# Patient Record
Sex: FEMALE | Race: WHITE | NOT HISPANIC OR LATINO | URBAN - METROPOLITAN AREA
[De-identification: names, ages, dates, MRNs, and addresses within clinical notes are randomized per-mention and may not be internally consistent; named-entity substitution may affect disease eponyms.]

---

## 2017-02-05 ENCOUNTER — EMERGENCY (EMERGENCY)
Facility: HOSPITAL | Age: 57
LOS: 0 days | Discharge: HOME | End: 2017-02-05

## 2017-06-27 DIAGNOSIS — Z98.1 ARTHRODESIS STATUS: ICD-10-CM

## 2017-06-27 DIAGNOSIS — R07.89 OTHER CHEST PAIN: ICD-10-CM

## 2017-06-27 DIAGNOSIS — K44.9 DIAPHRAGMATIC HERNIA WITHOUT OBSTRUCTION OR GANGRENE: ICD-10-CM

## 2017-06-27 DIAGNOSIS — Z98.890 OTHER SPECIFIED POSTPROCEDURAL STATES: ICD-10-CM

## 2017-06-27 DIAGNOSIS — E03.9 HYPOTHYROIDISM, UNSPECIFIED: ICD-10-CM

## 2017-06-27 DIAGNOSIS — Z90.710 ACQUIRED ABSENCE OF BOTH CERVIX AND UTERUS: ICD-10-CM

## 2018-03-15 ENCOUNTER — INPATIENT (INPATIENT)
Facility: HOSPITAL | Age: 58
LOS: 4 days | Discharge: HOME | End: 2018-03-20
Attending: INTERNAL MEDICINE

## 2018-03-15 VITALS
OXYGEN SATURATION: 97 % | HEIGHT: 68 IN | SYSTOLIC BLOOD PRESSURE: 148 MMHG | HEART RATE: 91 BPM | WEIGHT: 225.09 LBS | TEMPERATURE: 97 F | DIASTOLIC BLOOD PRESSURE: 86 MMHG | RESPIRATION RATE: 19 BRPM

## 2018-03-15 DIAGNOSIS — N91.2 AMENORRHEA, UNSPECIFIED: Chronic | ICD-10-CM

## 2018-03-15 DIAGNOSIS — Z98.890 OTHER SPECIFIED POSTPROCEDURAL STATES: Chronic | ICD-10-CM

## 2018-03-15 DIAGNOSIS — R42 DIZZINESS AND GIDDINESS: ICD-10-CM

## 2018-03-15 DIAGNOSIS — R56.9 UNSPECIFIED CONVULSIONS: ICD-10-CM

## 2018-03-15 DIAGNOSIS — E03.9 HYPOTHYROIDISM, UNSPECIFIED: ICD-10-CM

## 2018-03-15 LAB
ALBUMIN SERPL ELPH-MCNC: 4.6 G/DL — SIGNIFICANT CHANGE UP (ref 3.5–5.2)
ALP SERPL-CCNC: 91 U/L — SIGNIFICANT CHANGE UP (ref 30–115)
ALT FLD-CCNC: 18 U/L — SIGNIFICANT CHANGE UP (ref 0–41)
AST SERPL-CCNC: 19 U/L — SIGNIFICANT CHANGE UP (ref 0–41)
BASOPHILS # BLD AUTO: 0.06 K/UL — SIGNIFICANT CHANGE UP (ref 0–0.2)
BASOPHILS NFR BLD AUTO: 1.1 % — HIGH (ref 0–1)
BILIRUB SERPL-MCNC: 0.9 MG/DL — SIGNIFICANT CHANGE UP (ref 0.2–1.2)
BUN SERPL-MCNC: 12 MG/DL — SIGNIFICANT CHANGE UP (ref 10–20)
CALCIUM SERPL-MCNC: 9.5 MG/DL — SIGNIFICANT CHANGE UP (ref 8.5–10.1)
CHLORIDE SERPL-SCNC: 100 MMOL/L — SIGNIFICANT CHANGE UP (ref 98–110)
CK MB BLD-MCNC: 1 % — SIGNIFICANT CHANGE UP (ref 0–4)
CK MB CFR SERPL CALC: 1.8 NG/ML — SIGNIFICANT CHANGE UP (ref 0.6–6.3)
CK SERPL-CCNC: 161 U/L — SIGNIFICANT CHANGE UP (ref 0–225)
CO2 SERPL-SCNC: 26 MMOL/L — SIGNIFICANT CHANGE UP (ref 17–32)
CREAT SERPL-MCNC: 0.8 MG/DL — SIGNIFICANT CHANGE UP (ref 0.7–1.5)
EOSINOPHIL # BLD AUTO: 0.08 K/UL — SIGNIFICANT CHANGE UP (ref 0–0.7)
EOSINOPHIL NFR BLD AUTO: 1.4 % — SIGNIFICANT CHANGE UP (ref 0–8)
GLUCOSE SERPL-MCNC: 103 MG/DL — SIGNIFICANT CHANGE UP (ref 70–110)
HCT VFR BLD CALC: 44.5 % — SIGNIFICANT CHANGE UP (ref 37–47)
HGB BLD-MCNC: 15 G/DL — SIGNIFICANT CHANGE UP (ref 12–16)
IMM GRANULOCYTES NFR BLD AUTO: 0.2 % — SIGNIFICANT CHANGE UP (ref 0.1–0.3)
LYMPHOCYTES # BLD AUTO: 1.23 K/UL — SIGNIFICANT CHANGE UP (ref 1.2–3.4)
LYMPHOCYTES # BLD AUTO: 21.8 % — SIGNIFICANT CHANGE UP (ref 20.5–51.1)
MAGNESIUM SERPL-MCNC: 2 MG/DL — SIGNIFICANT CHANGE UP (ref 1.8–2.4)
MCHC RBC-ENTMCNC: 30.7 PG — SIGNIFICANT CHANGE UP (ref 27–31)
MCHC RBC-ENTMCNC: 33.7 G/DL — SIGNIFICANT CHANGE UP (ref 32–37)
MCV RBC AUTO: 91 FL — SIGNIFICANT CHANGE UP (ref 81–99)
MONOCYTES # BLD AUTO: 0.48 K/UL — SIGNIFICANT CHANGE UP (ref 0.1–0.6)
MONOCYTES NFR BLD AUTO: 8.5 % — SIGNIFICANT CHANGE UP (ref 1.7–9.3)
NEUTROPHILS # BLD AUTO: 3.77 K/UL — SIGNIFICANT CHANGE UP (ref 1.4–6.5)
NEUTROPHILS NFR BLD AUTO: 67 % — SIGNIFICANT CHANGE UP (ref 42.2–75.2)
PLATELET # BLD AUTO: 262 K/UL — SIGNIFICANT CHANGE UP (ref 130–400)
POTASSIUM SERPL-MCNC: 3.9 MMOL/L — SIGNIFICANT CHANGE UP (ref 3.5–5)
POTASSIUM SERPL-SCNC: 3.9 MMOL/L — SIGNIFICANT CHANGE UP (ref 3.5–5)
PROT SERPL-MCNC: 7 G/DL — SIGNIFICANT CHANGE UP (ref 6–8)
RBC # BLD: 4.89 M/UL — SIGNIFICANT CHANGE UP (ref 4.2–5.4)
RBC # FLD: 12.8 % — SIGNIFICANT CHANGE UP (ref 11.5–14.5)
SODIUM SERPL-SCNC: 142 MMOL/L — SIGNIFICANT CHANGE UP (ref 135–146)
TROPONIN T SERPL-MCNC: <0.01 NG/ML — SIGNIFICANT CHANGE UP
WBC # BLD: 5.63 K/UL — SIGNIFICANT CHANGE UP (ref 4.8–10.8)
WBC # FLD AUTO: 5.63 K/UL — SIGNIFICANT CHANGE UP (ref 4.8–10.8)

## 2018-03-15 RX ORDER — MECLIZINE HCL 12.5 MG
25 TABLET ORAL THREE TIMES A DAY
Qty: 0 | Refills: 0 | Status: DISCONTINUED | OUTPATIENT
Start: 2018-03-15 | End: 2018-03-20

## 2018-03-15 RX ORDER — LEVETIRACETAM 250 MG/1
250 TABLET, FILM COATED ORAL AT BEDTIME
Qty: 0 | Refills: 0 | Status: DISCONTINUED | OUTPATIENT
Start: 2018-03-15 | End: 2018-03-19

## 2018-03-15 RX ORDER — LEVOTHYROXINE SODIUM 125 MCG
50 TABLET ORAL DAILY
Qty: 0 | Refills: 0 | Status: DISCONTINUED | OUTPATIENT
Start: 2018-03-15 | End: 2018-03-20

## 2018-03-15 RX ORDER — LEVETIRACETAM 250 MG/1
250 TABLET, FILM COATED ORAL EVERY 24 HOURS
Qty: 0 | Refills: 0 | Status: DISCONTINUED | OUTPATIENT
Start: 2018-03-16 | End: 2018-03-19

## 2018-03-15 RX ORDER — LEVOTHYROXINE SODIUM 125 MCG
1 TABLET ORAL
Qty: 0 | Refills: 0 | COMMUNITY

## 2018-03-15 RX ORDER — INFLUENZA VIRUS VACCINE 15; 15; 15; 15 UG/.5ML; UG/.5ML; UG/.5ML; UG/.5ML
0.5 SUSPENSION INTRAMUSCULAR ONCE
Qty: 0 | Refills: 0 | Status: COMPLETED | OUTPATIENT
Start: 2018-03-15 | End: 2018-03-15

## 2018-03-15 RX ORDER — LEVOTHYROXINE SODIUM 125 MCG
300 TABLET ORAL DAILY
Qty: 0 | Refills: 0 | Status: DISCONTINUED | OUTPATIENT
Start: 2018-03-15 | End: 2018-03-20

## 2018-03-15 RX ORDER — SODIUM CHLORIDE 9 MG/ML
1000 INJECTION INTRAMUSCULAR; INTRAVENOUS; SUBCUTANEOUS ONCE
Qty: 0 | Refills: 0 | Status: COMPLETED | OUTPATIENT
Start: 2018-03-15 | End: 2018-03-15

## 2018-03-15 RX ORDER — LEVETIRACETAM 250 MG/1
250 TABLET, FILM COATED ORAL ONCE
Qty: 0 | Refills: 0 | Status: COMPLETED | OUTPATIENT
Start: 2018-03-15 | End: 2018-03-15

## 2018-03-15 RX ORDER — ENOXAPARIN SODIUM 100 MG/ML
40 INJECTION SUBCUTANEOUS EVERY 24 HOURS
Qty: 0 | Refills: 0 | Status: DISCONTINUED | OUTPATIENT
Start: 2018-03-15 | End: 2018-03-20

## 2018-03-15 RX ADMIN — SODIUM CHLORIDE 1000 MILLILITER(S): 9 INJECTION INTRAMUSCULAR; INTRAVENOUS; SUBCUTANEOUS at 09:46

## 2018-03-15 RX ADMIN — LEVETIRACETAM 250 MILLIGRAM(S): 250 TABLET, FILM COATED ORAL at 17:01

## 2018-03-15 RX ADMIN — LEVETIRACETAM 250 MILLIGRAM(S): 250 TABLET, FILM COATED ORAL at 21:21

## 2018-03-15 RX ADMIN — ENOXAPARIN SODIUM 40 MILLIGRAM(S): 100 INJECTION SUBCUTANEOUS at 13:52

## 2018-03-15 NOTE — H&P ADULT - NSHPPHYSICALEXAM_GEN_ALL_CORE
PHYSICAL EXAM:  GENERAL: NAD, well-developed  SKIN: No rashes or lesions  HEAD:  Atraumatic, Normocephalic  EYES: EOMI, PERRLA, conjunctiva and sclera clear; No nystagmus noted  NECK: Supple, No JVD  CHEST/LUNG: Clear to auscultation bilaterally; No wheeze  HEART: Regular rate and rhythm; No murmurs, rubs, or gallops  ABDOMEN: Soft, Nontender, Nondistended; Bowel sounds present  EXTREMITIES:  No clubbing, cyanosis, or edema  CNS: AAOx3;

## 2018-03-15 NOTE — H&P ADULT - NSHPLABSRESULTS_GEN_ALL_CORE
15.0   5.63  )-----------( 262      ( 15 Mar 2018 09:31 )             44.5       03-15    142  |  100  |  12  ----------------------------<  103  3.9   |  26  |  0.8    Ca    9.5      15 Mar 2018 09:31  Mg     2.0     03-15    TPro  7.0  /  Alb  4.6  /  TBili  0.9  /  DBili  x   /  AST  19  /  ALT  18  /  AlkPhos  91  03-15          Magnesium, Serum: 2.0 mg/dL (03-15-18 @ 09:31)

## 2018-03-15 NOTE — H&P ADULT - HISTORY OF PRESENT ILLNESS
From ED: 57 y.o F with hx of brain and neck tumors s/p resection, hypothyroidism, presenting for an episode of dizziness and nausea.  hx goes back to this morning as she was getting out of her house as she felt dizzy, her surroundings spinning, felt weak as if her legs are going to give out, called for help and did not have any fall. her neighbor helped her get in her house where she was lethargic, nauseous and dizzy. pt states having palpitations with the onset of symptoms. no fevers or chills, no hearing changes, no visual changes, no sensory or motor deficits.    Patient reports vertigo began approximately 2-3 weeks ago with episodes of vertigo lasting approximately several minutes happening 1-2x per week.  Vertigo became severe yesterday and this am Patient was unable to move.  She reports feeling the world move around her.  This am's episode accompanied with N/V x 1.  Denies any changes with head movement.  Also states that she had similar symptoms several years past and underwent tilt table testing which was positive.  She stated at that time was started on Beta blocker but stopped taking the medication in 2008.  Patient improved at bedside visit, vertigo resolved.  Her and her  also reporting seizure activity.  Patient with history of brain tumors s/p resection x 3.  Was on Phenobarbital and Keppra but stopped all medications in 2008.  Seen by Neurology in ED and requesting admission to V-EEG unit.

## 2018-03-15 NOTE — ED ADULT NURSE NOTE - PSH
Cessation of menses  c section  H/O abdominal hysterectomy    H/O cerebral aneurysm repair    H/O Spinal surgery  tumor removed plates in neck

## 2018-03-15 NOTE — ED ADULT NURSE NOTE - PMH
Brain aneurysm    Brain tumor, recurrent    Cervical cancer    Hypothyroid    Spine anomaly    Uterine cancer, sarcoma    Vertigo

## 2018-03-15 NOTE — CONSULT NOTE ADULT - ATTENDING COMMENTS
Agree with the Hx and plan.  she does have high risk for seizure.  Start keppra  500 today followed by 250 bid    V-EEG

## 2018-03-15 NOTE — ED PROVIDER NOTE - OBJECTIVE STATEMENT
57 y.o F with hx of brain and neck tumors s/p resection, hypothyroidism, presenting for an episode of dizziness and nausea.  hx goes back to this morning as she was getting out of her house as she felt dizzy, her surroundings spinning, felt weak as if her legs are going to give out, called for help and did not have any fall. her neighbor helped her get in her house where she was lethargic, nauseous and dizzy. pt states having palpitations with the onset of symptoms. no fevers or chills, no hearing changes, no visual changes, no sensory or motor deficits.  ROS otherwise negative.

## 2018-03-15 NOTE — H&P ADULT - NSHPSOCIALHISTORY_GEN_ALL_CORE
Tobacco use: Denies   EtOH use: Denies   Illicit drug use: Denies   Marital Status: Patient is  and lives at home with her .

## 2018-03-15 NOTE — CONSULT NOTE ADULT - SUBJECTIVE AND OBJECTIVE BOX
Neurology Consult    JOSIAS QUAN 57y Female  [id number]    Patient is a 57y old right-handed Female who presents with a chief complaint of Vertigo      HPI: History is obtained from patient and spouse  57 y.o female presented for an episode of severe vertigo and nausea/vomiting.  Patient woke up this morning as usual, then she was getting out of her house and felt very dizzy, her surroundings spinning, felt weak as if her legs are going to give out. Patient called for help and did not fall. The neighbor helped her get back to the house where she was very nauseous, vomited once. Patient reports this episode lasted for at least 1 hour, no LOC or confusion. Patient had a very similar episode 2 years ago, diagnosed as vertigo, was Rx Meclizine, but did  not use it for 2 years.   Patient is being followed by neurologist Dr. Henry, has appt scheduled for next week because of headaches that started 2 months ago.   In addition, about 1 month ago patient started having nocturnal episodes described by  as vocalizations associated with increased tone and 4 extremities clonic movements, ? L>R lasting, several minutes, at times in clusters. These events increased in frequency, and lately happen almost every night. These episodes are similar to the once patient had after craniotomy in  for aneurism clipping complicated by infection that required a revision. Patient was treated initially with phenobarbital, then Keppra was added. Pt remained seizure-free after that for several years, and in  self-discontinued AED. Patient stated she had REEG few years ago and was told by her neurologist it was "fine"      PAST MEDICAL & SURGICAL HISTORY:  Hypothyroidism  Spine anomaly  Uterine cancer  Cervical cancer  Brain aneurysm   seizures  H/O Spinal surgery: tumor removed plates in neck  H/O craniotomy for cerebral aneurysm repair   H/O abdominal hysterectomy      Social History: (-) x 3    Allergy:  No Known Allergies      Home Medications:  Synthroid: 350 microgram(s) orally once a day (15 Mar 2018 13:00)      MEDICATIONS  (STANDING):  enoxaparin Injectable 40 milliGRAM(s) SubCutaneous every 24 hours  levETIRAcetam 250 milliGRAM(s) in the morning  levETIRAcetam 250 milliGRAM(s) Oral at bedtime  levothyroxine 350 MICROGram(s) Oral daily      MEDICATIONS  (PRN):  LORazepam   Injectable 2 milliGRAM(s) IV Push four times a day PRN for generalized tonic-clonic seizure lasting longer than 2 minutes, or two consecutive seizures without return to baseline in-between  meclizine 25 milliGRAM(s) Oral three times a day PRN Dizziness      T(F): 96.9 (03-15-18 @ 15:25), Max: 97 (03-15-18 @ 14:37)  HR: 92 (03-15-18 @ 15:25) (61 - 92)  BP: 138/77 (03-15-18 @ 15:25) (132/68 - 148/86)  RR: 18 (03-15-18 @ 15:25) (16 - 20)  SpO2: 99% (03-15-18 @ 13:02) (97% - 99%)      Neurologic Examination:  General:  Appearance is consistent with chronologic age.  No abnormal facies.   General: The patient is oriented to person, place, time and date.  Recent and remote memory intact.  Follows 4-step directions. Fund of knowledge is intact and normal.  Language with normal repetition, comprehension and naming.  Nondysarthric.    Cranial nerves: EOMI w/o nystagmus, skew or reported double vision.  PERRL.  No ptosis/weakness of eyelid closure.  Facial sensation is normal with normal bite.  No facial asymmetry.  Hearing grossly intact b/l.  Palate elevates midline.  Tongue midline.  Motor examination:   Normal tone, bulk and range of motion.  No tenderness, twitching, tremors or involuntary movements.  Formal Muscle Strength Testin/5 UE; 5/5 LE.  No observable drift.  Sensory examination:   Intact to light touch and pinprick, pain  Cerebellum:   FTN/HKS intact with normal VIRGINIE in all limbs.  No dysmetria or dysdiadokinesia.  Gait narrow based and normal.    Labs:                         15.0   5.63  )-----------( 262      ( 15 Mar 2018 09:31 )             44.5     -15    142  |  100  |  12  ----------------------------<  103  3.9   |  26  |  0.8    Ca    9.5      15 Mar 2018 09:31  Mg     2.0     -15    TPro  7.0  /  Alb  4.6  /  TBili  0.9  /  DBili  x   /  AST  19  /  ALT  18  /  AlkPhos  91  03-15    LIVER FUNCTIONS - ( 15 Mar 2018 09:31 )  Alb: 4.6 g/dL / Pro: 7.0 g/dL / ALK PHOS: 91 U/L / ALT: 18 U/L / AST: 19 U/L / GGT: x             Neuroimaging:  NCHCT: CT Head No Cont:   EXAM:  CT BRAIN          PROCEDURE DATE:  03/15/2018      INTERPRETATION:  Clinical History / Reason for exam: Dizziness for    Technique: Noncontrast head CT.  Contiguous unenhanced CT axial images of   the head from the base to the vertex.    Comparison:  None available.    Findings:    The patient is status post bifrontal craniotomy. There is a small defect   within the body of the corpus callosum which is likely postoperative in   nature. Correlate with prior surgical history.    The ventricles and cortical sulci are normal in size and configuration.       Gray-white matter differentiation is maintained.     There is no acute intracranial hemorrhage, extra-axial fluid collection   or midline shift.      The osseous structures are unremarkable. The visualized paranasal sinuses   and mastoids are well-aerated.    IMPRESSION:     No evidence of acute intracranial pathology.    Status post prior bifrontal craniotomy with a postoperative defect in the   corpus callosum. Correlate with surgical history.    SHARAD GRAHAM M.D., ATTENDING RADIOLOGIST  This document has been electronically signed. Mar 15 2018 10:14AM       (03-15-18 @ 09:54)      Assessment:  This is a 57y Female with h/o bilateral craniotomies for aneurism clipping followed by revision due to infection, seizures, lumbar and cervical surgeries, uterine cancer, hysterectomy, hypothyroidism, presented to ED with severe vertigo. Also, has nocturnal episodes suggestive of seizures for several weeks with increasing frequency, off AED for 10 years.    Discussed with Dr. Hawk      Plan:   VEEG for characterization and treatment plan (will be started tomorrow morning)  Seizure precautions  Keppra 250mg x1 stat, then 250mg q12hrs (ordered)  Ativan 2mg IV PRN for generalized tonic-clonic seizure lasting longer than 2 minutes, or two consecutive seizures without return to baseline in-between (ordered)  Keep Mg above 2    Plan discussed with patient and spouse in details, all questions answered.    03-15-18 @ 16:26

## 2018-03-15 NOTE — ED PROVIDER NOTE - ATTENDING CONTRIBUTION TO CARE
pt with vertigo typical of prior episode for which she had MRI, however has been having nocturnal seizures per  for which she used to be medicated but stopped meds herself, PE as above/neuro intact, labs and studies reviewed, d/w neuro, will admit EMU

## 2018-03-16 LAB
ANION GAP SERPL CALC-SCNC: 19 MMOL/L — HIGH (ref 7–14)
BASOPHILS # BLD AUTO: 0.05 K/UL — SIGNIFICANT CHANGE UP (ref 0–0.2)
BASOPHILS NFR BLD AUTO: 0.8 % — SIGNIFICANT CHANGE UP (ref 0–1)
BUN SERPL-MCNC: 16 MG/DL — SIGNIFICANT CHANGE UP (ref 10–20)
CALCIUM SERPL-MCNC: 10 MG/DL — SIGNIFICANT CHANGE UP (ref 8.5–10.1)
CHLORIDE SERPL-SCNC: 98 MMOL/L — SIGNIFICANT CHANGE UP (ref 98–110)
CO2 SERPL-SCNC: 25 MMOL/L — SIGNIFICANT CHANGE UP (ref 17–32)
CREAT SERPL-MCNC: 0.9 MG/DL — SIGNIFICANT CHANGE UP (ref 0.7–1.5)
EOSINOPHIL # BLD AUTO: 0.14 K/UL — SIGNIFICANT CHANGE UP (ref 0–0.7)
EOSINOPHIL NFR BLD AUTO: 2.3 % — SIGNIFICANT CHANGE UP (ref 0–8)
GLUCOSE SERPL-MCNC: 86 MG/DL — SIGNIFICANT CHANGE UP (ref 70–110)
HCT VFR BLD CALC: 46.4 % — SIGNIFICANT CHANGE UP (ref 37–47)
HGB BLD-MCNC: 15.5 G/DL — SIGNIFICANT CHANGE UP (ref 12–16)
IMM GRANULOCYTES NFR BLD AUTO: 0.2 % — SIGNIFICANT CHANGE UP (ref 0.1–0.3)
LYMPHOCYTES # BLD AUTO: 1.9 K/UL — SIGNIFICANT CHANGE UP (ref 1.2–3.4)
LYMPHOCYTES # BLD AUTO: 31.7 % — SIGNIFICANT CHANGE UP (ref 20.5–51.1)
MCHC RBC-ENTMCNC: 30.8 PG — SIGNIFICANT CHANGE UP (ref 27–31)
MCHC RBC-ENTMCNC: 33.4 G/DL — SIGNIFICANT CHANGE UP (ref 32–37)
MCV RBC AUTO: 92.1 FL — SIGNIFICANT CHANGE UP (ref 81–99)
MONOCYTES # BLD AUTO: 0.51 K/UL — SIGNIFICANT CHANGE UP (ref 0.1–0.6)
MONOCYTES NFR BLD AUTO: 8.5 % — SIGNIFICANT CHANGE UP (ref 1.7–9.3)
NEUTROPHILS # BLD AUTO: 3.38 K/UL — SIGNIFICANT CHANGE UP (ref 1.4–6.5)
NEUTROPHILS NFR BLD AUTO: 56.5 % — SIGNIFICANT CHANGE UP (ref 42.2–75.2)
NRBC # BLD: 0 /100 WBCS — SIGNIFICANT CHANGE UP (ref 0–0)
PLATELET # BLD AUTO: 189 K/UL — SIGNIFICANT CHANGE UP (ref 130–400)
POTASSIUM SERPL-MCNC: 4.1 MMOL/L — SIGNIFICANT CHANGE UP (ref 3.5–5)
POTASSIUM SERPL-SCNC: 4.1 MMOL/L — SIGNIFICANT CHANGE UP (ref 3.5–5)
RBC # BLD: 5.04 M/UL — SIGNIFICANT CHANGE UP (ref 4.2–5.4)
RBC # FLD: 13.2 % — SIGNIFICANT CHANGE UP (ref 11.5–14.5)
SODIUM SERPL-SCNC: 142 MMOL/L — SIGNIFICANT CHANGE UP (ref 135–146)
WBC # BLD: 5.99 K/UL — SIGNIFICANT CHANGE UP (ref 4.8–10.8)
WBC # FLD AUTO: 5.99 K/UL — SIGNIFICANT CHANGE UP (ref 4.8–10.8)

## 2018-03-16 RX ORDER — ACETAMINOPHEN 500 MG
650 TABLET ORAL EVERY 6 HOURS
Qty: 0 | Refills: 0 | Status: DISCONTINUED | OUTPATIENT
Start: 2018-03-16 | End: 2018-03-20

## 2018-03-16 RX ADMIN — Medication 30 MILLILITER(S): at 16:59

## 2018-03-16 RX ADMIN — LEVETIRACETAM 250 MILLIGRAM(S): 250 TABLET, FILM COATED ORAL at 21:33

## 2018-03-16 RX ADMIN — Medication 650 MILLIGRAM(S): at 19:33

## 2018-03-16 RX ADMIN — Medication 650 MILLIGRAM(S): at 20:03

## 2018-03-16 RX ADMIN — Medication 650 MILLIGRAM(S): at 00:34

## 2018-03-16 RX ADMIN — Medication 300 MICROGRAM(S): at 06:49

## 2018-03-16 RX ADMIN — ENOXAPARIN SODIUM 40 MILLIGRAM(S): 100 INJECTION SUBCUTANEOUS at 14:27

## 2018-03-16 RX ADMIN — LEVETIRACETAM 250 MILLIGRAM(S): 250 TABLET, FILM COATED ORAL at 10:11

## 2018-03-16 RX ADMIN — Medication 650 MILLIGRAM(S): at 01:04

## 2018-03-16 RX ADMIN — Medication 50 MICROGRAM(S): at 06:49

## 2018-03-17 RX ADMIN — Medication 30 MILLILITER(S): at 10:17

## 2018-03-17 RX ADMIN — Medication 30 MILLILITER(S): at 18:36

## 2018-03-17 RX ADMIN — Medication 300 MICROGRAM(S): at 06:36

## 2018-03-17 RX ADMIN — Medication 50 MICROGRAM(S): at 06:36

## 2018-03-17 NOTE — PROGRESS NOTE ADULT - SUBJECTIVE AND OBJECTIVE BOX
[id number]  [facility]  57[facility]    [facility]    Allergies: No Known Allergies      Medications:   enoxaparin Injectable 40 milliGRAM(s) SubCutaneous every 24 hours  levETIRAcetam 250 milliGRAM(s) Oral at bedtime  levETIRAcetam 250 milliGRAM(s) Oral every 24 hours  levothyroxine 300 MICROGram(s) Oral daily  levothyroxine 50 MICROGram(s) Oral daily  LORazepam   Injectable 2 milliGRAM(s) IV Push four times a day PRN  meclizine 25 milliGRAM(s) Oral three times a day PRN      T(C): 36.1 (03-17-18 @ 04:56), Max: 36.1 (03-16-18 @ 22:21)  HR: 61 (03-17-18 @ 04:56) (61 - 75)  BP: 135/75 (03-17-18 @ 04:56) (118/54 - 137/81)  RR: 16 (03-17-18 @ 04:56) (14 - 16)  SpO2: --      No events overnight    VEEG shows well organized and symmetric background. There are no epileptiform discharges or electrographic seizures.      PHYSICAL EXAM:    Constitutional: Awake and conversive. In NAD    Neurological: CN II-XII in tact. No nystagmus. Motor exam with full strength throughout, normal tone

## 2018-03-17 NOTE — PROGRESS NOTE ADULT - SUBJECTIVE AND OBJECTIVE BOX
JOSIAS QUAN  57y  Female    Patient is a 57y old  Female who presents with a chief complaint of Vertigo x 2 weeks (15 Mar 2018 12:55)    ALLERGIES:  No Known Allergies      INTERVAL HPI/OVERNIGHT EVENTS:    VITALS:  T(F): 96.9 (03-17-18 @ 04:56), Max: 97 (03-16-18 @ 22:21)  HR: 61 (03-17-18 @ 04:56) (61 - 75)  BP: 135/75 (03-17-18 @ 04:56) (118/54 - 137/81)  RR: 16 (03-17-18 @ 04:56) (14 - 16)  SpO2: --    LABS:  03-16    142  |  98  |  16  ----------------------------<  86  4.1   |  25  |  0.9    Ca    10.0      16 Mar 2018 07:12      MICROBIOLOGY:    MEDICATION:  acetaminophen   Tablet. 650 milliGRAM(s) Oral every 6 hours PRN  bismuth subsalicylate Liquid 30 milliLiter(s) Oral every 12 hours PRN  enoxaparin Injectable 40 milliGRAM(s) SubCutaneous every 24 hours  levETIRAcetam 250 milliGRAM(s) Oral at bedtime  levETIRAcetam 250 milliGRAM(s) Oral every 24 hours  levothyroxine 300 MICROGram(s) Oral daily  levothyroxine 50 MICROGram(s) Oral daily  LORazepam   Injectable 2 milliGRAM(s) IV Push four times a day PRN  meclizine 25 milliGRAM(s) Oral three times a day PRN    RADIOLOGY & ADDITIONAL TESTS:

## 2018-03-18 RX ADMIN — Medication 50 MICROGRAM(S): at 08:23

## 2018-03-18 RX ADMIN — Medication 30 MILLILITER(S): at 17:22

## 2018-03-18 RX ADMIN — Medication 300 MICROGRAM(S): at 08:23

## 2018-03-18 NOTE — PROGRESS NOTE ADULT - ASSESSMENT
57 year old female admitted for VEEG to rule out nocturnal seizures. No events over two days and VEEG normal.    Recommend remaining another night on VEEG as based on clinical history likely to have a stereotypic event in the next day or so. Patient prefers not to stay but will discuss with her family members. If chooses togo home, is already scheduled to see her Neurologist on Friday and outpatient Ambulatory EEG may be an option for further work-up
57 year old female with history of vertigo as well as nocturnal altered mental status episodes suspicious for seizures.    1. Continue VEEG to capture an episode  2. Please discontinue Keppra
57 year old female admitted for VEEG to rule out nocturnal seizures. No events over two days and VEEG normal.    Recommend remaining another night on VEEG as per neurolgy
continue 24 Veg  neurology NP  noted --agree w plan

## 2018-03-18 NOTE — PROGRESS NOTE ADULT - SUBJECTIVE AND OBJECTIVE BOX
JOSIAS DAVIESCARMELITA  57y  Female    Patient is a 57y old  Female who presents with a chief complaint of Vertigo x 2 weeks (15 Mar 2018 12:55)    ALLERGIES:  No Known Allergies      INTERVAL HPI/OVERNIGHT EVENTS:    VITALS:  T(F): 96.3 (03-18-18 @ 05:51), Max: 97.5 (03-17-18 @ 22:42)  HR: 59 (03-18-18 @ 05:51) (59 - 84)  BP: 133/61 (03-18-18 @ 05:51) (133/61 - 143/76)  RR: 16 (03-18-18 @ 05:51) (16 - 16)  SpO2: --    LABS:        MICROBIOLOGY:    MEDICATION:  acetaminophen   Tablet. 650 milliGRAM(s) Oral every 6 hours PRN  bismuth subsalicylate Liquid 30 milliLiter(s) Oral every 12 hours PRN  enoxaparin Injectable 40 milliGRAM(s) SubCutaneous every 24 hours  levETIRAcetam 250 milliGRAM(s) Oral at bedtime  levETIRAcetam 250 milliGRAM(s) Oral every 24 hours  levothyroxine 300 MICROGram(s) Oral daily  levothyroxine 50 MICROGram(s) Oral daily  LORazepam   Injectable 2 milliGRAM(s) IV Push four times a day PRN  meclizine 25 milliGRAM(s) Oral three times a day PRN    RADIOLOGY & ADDITIONAL TESTS:

## 2018-03-18 NOTE — PROVIDER CONTACT NOTE (OTHER) - ASSESSMENT
pt c/o heartburn, followed by chest pain and palpitations, vs in flowsheet, hr 80, no other s/s of distress, md dilia GOETZ made aware ordered ekg.

## 2018-03-18 NOTE — PROGRESS NOTE ADULT - SUBJECTIVE AND OBJECTIVE BOX
[id number]  [facility]  57[facility]    [facility]    Allergies: No Known Allergies      Medications:   enoxaparin Injectable 40 milliGRAM(s) SubCutaneous every 24 hours  levETIRAcetam 250 milliGRAM(s) Oral at bedtime  levETIRAcetam 250 milliGRAM(s) Oral every 24 hours  levothyroxine 300 MICROGram(s) Oral daily  levothyroxine 50 MICROGram(s) Oral daily  meclizine 25 milliGRAM(s) Oral three times a day PRN      T(C): 35.7 (03-18-18 @ 05:51), Max: 36.4 (03-17-18 @ 22:42)  HR: 59 (03-18-18 @ 05:51) (59 - 84)  BP: 133/61 (03-18-18 @ 05:51) (133/61 - 143/76)  RR: 16 (03-18-18 @ 05:51) (16 - 16)  SpO2: --    Report of brief dizziness yesterday morning.  VEEG continues to show normal background. There is no slowing. There are no epileptiform discharges and no electrographic seizures.    PHYSICAL EXAM:    Awake, conversive, in NAD  CN II-XII in tact, no nystagmus  Motor grossly in tact, ambulatory without assistance

## 2018-03-19 RX ADMIN — Medication 30 MILLILITER(S): at 17:53

## 2018-03-19 RX ADMIN — Medication 50 MICROGRAM(S): at 05:06

## 2018-03-19 RX ADMIN — Medication 650 MILLIGRAM(S): at 18:27

## 2018-03-19 RX ADMIN — Medication 300 MICROGRAM(S): at 05:07

## 2018-03-19 RX ADMIN — Medication 650 MILLIGRAM(S): at 05:05

## 2018-03-19 NOTE — PROGRESS NOTE ADULT - SUBJECTIVE AND OBJECTIVE BOX
JOSIAS DAVIESCARMELITA  57y  Female    Patient is a 57y old  Female who presents with a chief complaint of Vertigo x 2 weeks (15 Mar 2018 12:55)    ALLERGIES:  No Known Allergies      INTERVAL HPI/OVERNIGHT EVENTS:    VITALS:  T(F): 96.9 (03-19-18 @ 05:40), Max: 97.3 (03-18-18 @ 22:00)  HR: 61 (03-19-18 @ 05:40) (61 - 88)  BP: 143/63 (03-19-18 @ 05:40) (140/66 - 160/73)  RR: 16 (03-19-18 @ 05:40) (16 - 17)  SpO2: --    LABS:        MICROBIOLOGY:    MEDICATION:  acetaminophen   Tablet. 650 milliGRAM(s) Oral every 6 hours PRN  bismuth subsalicylate Liquid 30 milliLiter(s) Oral every 12 hours PRN  enoxaparin Injectable 40 milliGRAM(s) SubCutaneous every 24 hours  levETIRAcetam 250 milliGRAM(s) Oral at bedtime  levETIRAcetam 250 milliGRAM(s) Oral every 24 hours  levothyroxine 300 MICROGram(s) Oral daily  levothyroxine 50 MICROGram(s) Oral daily  LORazepam   Injectable 2 milliGRAM(s) IV Push four times a day PRN  meclizine 25 milliGRAM(s) Oral three times a day PRN    RADIOLOGY & ADDITIONAL TESTS:

## 2018-03-19 NOTE — PROGRESS NOTE ADULT - SUBJECTIVE AND OBJECTIVE BOX
Epilepsy Attending Note:     JOSIAS QUAN    57y Female [id number]    Vital Signs Last 24 Hrs  T(C): 36.1 (19 Mar 2018 05:40), Max: 36.3 (18 Mar 2018 22:00)  T(F): 96.9 (19 Mar 2018 05:40), Max: 97.3 (18 Mar 2018 22:00)  HR: 61 (19 Mar 2018 05:40) (61 - 88)  BP: 143/63 (19 Mar 2018 05:40) (140/66 - 160/73)  BP(mean): --  RR: 16 (19 Mar 2018 05:40) (16 - 17)  SpO2: --                MEDICATIONS  (STANDING):  enoxaparin Injectable 40 milliGRAM(s) SubCutaneous every 24 hours  levETIRAcetam 250 milliGRAM(s) Oral at bedtime  levETIRAcetam 250 milliGRAM(s) Oral every 24 hours  levothyroxine 300 MICROGram(s) Oral daily  levothyroxine 50 MICROGram(s) Oral daily    MEDICATIONS  (PRN):  acetaminophen   Tablet. 650 milliGRAM(s) Oral every 6 hours PRN Mild Pain (1 - 3) or HA  bismuth subsalicylate Liquid 30 milliLiter(s) Oral every 12 hours PRN gi upset  LORazepam   Injectable 2 milliGRAM(s) IV Push four times a day PRN for generalized tonic-clonic seizure lasting longer than 2 minutes, or two consecutive seizures without return to baseline in-between  meclizine 25 milliGRAM(s) Oral three times a day PRN Dizziness        VEEG in the last 24 hours:    Background------8-9 Hz.    Focal and generalized slowing-------borderline right fronto-temporo-central slowing    Intericatal activity------rare right anterior quadrants sharp transients    Events---------none    Seizures--------none    Impression:   abnormal due to the above     Plan - continue the monitoring , no AED, seizure precaution

## 2018-03-20 ENCOUNTER — TRANSCRIPTION ENCOUNTER (OUTPATIENT)
Age: 58
End: 2018-03-20

## 2018-03-20 VITALS
HEART RATE: 59 BPM | DIASTOLIC BLOOD PRESSURE: 65 MMHG | TEMPERATURE: 96 F | SYSTOLIC BLOOD PRESSURE: 153 MMHG | RESPIRATION RATE: 16 BRPM

## 2018-03-20 RX ORDER — LEVOTHYROXINE SODIUM 125 MCG
1 TABLET ORAL
Qty: 0 | Refills: 0 | DISCHARGE
Start: 2018-03-20

## 2018-03-20 RX ORDER — LEVETIRACETAM 250 MG/1
1 TABLET, FILM COATED ORAL
Qty: 60 | Refills: 1
Start: 2018-03-20 | End: 2018-05-18

## 2018-03-20 RX ORDER — LEVETIRACETAM 250 MG/1
1500 TABLET, FILM COATED ORAL
Qty: 0 | Refills: 1 | DISCHARGE
Start: 2018-03-20 | End: 2018-05-18

## 2018-03-20 RX ORDER — LEVOTHYROXINE SODIUM 125 MCG
350 TABLET ORAL
Qty: 0 | Refills: 0 | COMMUNITY

## 2018-03-20 RX ORDER — LEVETIRACETAM 250 MG/1
12 TABLET, FILM COATED ORAL
Qty: 0 | Refills: 1 | DISCHARGE
Start: 2018-03-20 | End: 2018-05-18

## 2018-03-20 RX ORDER — LEVETIRACETAM 250 MG/1
250 TABLET, FILM COATED ORAL ONCE
Qty: 0 | Refills: 0 | Status: COMPLETED | OUTPATIENT
Start: 2018-03-20 | End: 2018-03-20

## 2018-03-20 RX ORDER — LEVETIRACETAM 250 MG/1
250 TABLET, FILM COATED ORAL
Qty: 0 | Refills: 0 | Status: DISCONTINUED | OUTPATIENT
Start: 2018-03-20 | End: 2018-03-20

## 2018-03-20 RX ADMIN — LEVETIRACETAM 250 MILLIGRAM(S): 250 TABLET, FILM COATED ORAL at 10:51

## 2018-03-20 RX ADMIN — Medication 50 MICROGRAM(S): at 06:28

## 2018-03-20 RX ADMIN — Medication 300 MICROGRAM(S): at 06:28

## 2018-03-20 NOTE — DISCHARGE NOTE ADULT - PLAN OF CARE
prevent seizure take medications as prescribed  follow up with neurology in 2 weeks  follow up with primary care doctor in 1 week

## 2018-03-20 NOTE — PROGRESS NOTE ADULT - SUBJECTIVE AND OBJECTIVE BOX
Neurology NP addendum:    Rx for Keppra sent to [facility].  Patient has a follow up appointment with neurologist Dr. Henry this week.  All questions answered, patient verbalized understanding

## 2018-03-20 NOTE — PROGRESS NOTE ADULT - SUBJECTIVE AND OBJECTIVE BOX
Epilepsy Attending Note:     JOSIAS QUAN    57y Female [id number]    Vital Signs Last 24 Hrs  T(C): 35.3 (20 Mar 2018 04:59), Max: 36.4 (19 Mar 2018 22:06)  T(F): 95.6 (20 Mar 2018 04:59), Max: 97.6 (19 Mar 2018 22:06)  HR: 59 (20 Mar 2018 04:59) (59 - 81)  BP: 153/65 (20 Mar 2018 04:59) (132/77 - 153/75)  BP(mean): --  RR: 16 (20 Mar 2018 04:59) (16 - 18)  SpO2: --                MEDICATIONS  (STANDING):  enoxaparin Injectable 40 milliGRAM(s) SubCutaneous every 24 hours  levothyroxine 300 MICROGram(s) Oral daily  levothyroxine 50 MICROGram(s) Oral daily    MEDICATIONS  (PRN):  acetaminophen   Tablet. 650 milliGRAM(s) Oral every 6 hours PRN Mild Pain (1 - 3) or HA  bismuth subsalicylate Liquid 30 milliLiter(s) Oral every 12 hours PRN gi upset  LORazepam   Injectable 2 milliGRAM(s) IV Push four times a day PRN for generalized tonic-clonic seizure lasting longer than 2 minutes, or two consecutive seizures without return to baseline in-between  meclizine 25 milliGRAM(s) Oral three times a day PRN Dizziness        VEEG in the last 24 hours:    Background    Focal and generalized slowing--------Borderline to mil d right fronto-central slowing    Intericatal activity--------rigt parital/parasagital sharp transients    Events-----no clinical or sub-clinical seizures    Seizures-------as above    Impression: the history and EEG suggestive of partial seizures from the right hemisphere     Plan - suggest AED / keppra if agreed start 250 bid to be increased to 750 ER only at night as an outpatient

## 2018-03-20 NOTE — DISCHARGE NOTE ADULT - MEDICATION SUMMARY - MEDICATIONS TO TAKE
I will START or STAY ON the medications listed below when I get home from the hospital:    Keppra 250 mg oral tablet  -- 1 tab(s) by mouth every 12 hours   -- Check with your doctor before becoming pregnant.  It is very important that you take or use this exactly as directed.  Do not skip doses or discontinue unless directed by your doctor.  May cause drowsiness or dizziness.  Obtain medical advice before taking any non-prescription drugs as some may affect the action of this medication.  Swallow whole.  Do not crush.  This drug may impair the ability to drive or operate machinery.  Use care until you become familiar with its effects.    -- Indication: For Seizure    levothyroxine 300 mcg (0.3 mg) oral tablet  -- 1 tab(s) by mouth once a day  -- Indication: For Hypothyroid    levothyroxine 50 mcg (0.05 mg) oral tablet  -- 1 tab(s) by mouth once a day  -- Indication: For Hypothyroid

## 2018-03-20 NOTE — DISCHARGE NOTE ADULT - CARE PLAN
Principal Discharge DX:	Seizure  Goal:	prevent seizure  Assessment and plan of treatment:	take medications as prescribed  follow up with neurology in 2 weeks  follow up with primary care doctor in 1 week

## 2018-03-20 NOTE — DISCHARGE NOTE ADULT - CARE PROVIDER_API CALL
Dany Wagner), Internal Medicine  [address] [place] [address]  Phone: [phone]  Fax: [phone]    Drew Hawk), Neurology  [address] [place] [address]  Phone: [phone]  Fax: [phone]

## 2018-03-22 DIAGNOSIS — R42 DIZZINESS AND GIDDINESS: ICD-10-CM

## 2018-03-22 DIAGNOSIS — Z85.41 PERSONAL HISTORY OF MALIGNANT NEOPLASM OF CERVIX UTERI: ICD-10-CM

## 2018-03-22 DIAGNOSIS — Z85.42 PERSONAL HISTORY OF MALIGNANT NEOPLASM OF OTHER PARTS OF UTERUS: ICD-10-CM

## 2018-03-22 DIAGNOSIS — E03.9 HYPOTHYROIDISM, UNSPECIFIED: ICD-10-CM

## 2018-03-22 DIAGNOSIS — G40.909 EPILEPSY, UNSPECIFIED, NOT INTRACTABLE, WITHOUT STATUS EPILEPTICUS: ICD-10-CM

## 2018-04-03 DIAGNOSIS — Z86.03 PERSONAL HISTORY OF NEOPLASM OF UNCERTAIN BEHAVIOR: ICD-10-CM

## 2018-04-03 DIAGNOSIS — G40.909 EPILEPSY, UNSPECIFIED, NOT INTRACTABLE, WITHOUT STATUS EPILEPTICUS: ICD-10-CM

## 2020-07-02 ENCOUNTER — INPATIENT (INPATIENT)
Facility: HOSPITAL | Age: 60
LOS: 3 days | Discharge: HOME | End: 2020-07-06
Attending: FAMILY MEDICINE | Admitting: FAMILY MEDICINE
Payer: COMMERCIAL

## 2020-07-02 VITALS
DIASTOLIC BLOOD PRESSURE: 64 MMHG | OXYGEN SATURATION: 95 % | RESPIRATION RATE: 16 BRPM | TEMPERATURE: 101 F | SYSTOLIC BLOOD PRESSURE: 138 MMHG | HEIGHT: 68 IN | WEIGHT: 225.09 LBS | HEART RATE: 107 BPM

## 2020-07-02 DIAGNOSIS — N91.2 AMENORRHEA, UNSPECIFIED: Chronic | ICD-10-CM

## 2020-07-02 DIAGNOSIS — Z98.890 OTHER SPECIFIED POSTPROCEDURAL STATES: Chronic | ICD-10-CM

## 2020-07-02 DIAGNOSIS — Z88.1 ALLERGY STATUS TO OTHER ANTIBIOTIC AGENTS STATUS: ICD-10-CM

## 2020-07-02 DIAGNOSIS — K51.00 ULCERATIVE (CHRONIC) PANCOLITIS WITHOUT COMPLICATIONS: ICD-10-CM

## 2020-07-02 LAB
ALBUMIN SERPL ELPH-MCNC: 4.6 G/DL — SIGNIFICANT CHANGE UP (ref 3.5–5.2)
ALP SERPL-CCNC: 106 U/L — SIGNIFICANT CHANGE UP (ref 30–115)
ALT FLD-CCNC: 19 U/L — SIGNIFICANT CHANGE UP (ref 0–41)
ANION GAP SERPL CALC-SCNC: 11 MMOL/L — SIGNIFICANT CHANGE UP (ref 7–14)
APPEARANCE UR: CLEAR — SIGNIFICANT CHANGE UP
AST SERPL-CCNC: 24 U/L — SIGNIFICANT CHANGE UP (ref 0–41)
BACTERIA # UR AUTO: ABNORMAL
BASOPHILS # BLD AUTO: 0.03 K/UL — SIGNIFICANT CHANGE UP (ref 0–0.2)
BASOPHILS NFR BLD AUTO: 0.3 % — SIGNIFICANT CHANGE UP (ref 0–1)
BILIRUB SERPL-MCNC: 0.4 MG/DL — SIGNIFICANT CHANGE UP (ref 0.2–1.2)
BILIRUB UR-MCNC: NEGATIVE — SIGNIFICANT CHANGE UP
BUN SERPL-MCNC: 9 MG/DL — LOW (ref 10–20)
CALCIUM SERPL-MCNC: 9.1 MG/DL — SIGNIFICANT CHANGE UP (ref 8.5–10.1)
CHLORIDE SERPL-SCNC: 99 MMOL/L — SIGNIFICANT CHANGE UP (ref 98–110)
CO2 SERPL-SCNC: 28 MMOL/L — SIGNIFICANT CHANGE UP (ref 17–32)
COLOR SPEC: YELLOW — SIGNIFICANT CHANGE UP
CREAT SERPL-MCNC: 1.1 MG/DL — SIGNIFICANT CHANGE UP (ref 0.7–1.5)
DIFF PNL FLD: ABNORMAL
EOSINOPHIL # BLD AUTO: 0 K/UL — SIGNIFICANT CHANGE UP (ref 0–0.7)
EOSINOPHIL NFR BLD AUTO: 0 % — SIGNIFICANT CHANGE UP (ref 0–8)
EPI CELLS # UR: ABNORMAL /HPF
GLUCOSE SERPL-MCNC: 116 MG/DL — HIGH (ref 70–99)
GLUCOSE UR QL: NEGATIVE MG/DL — SIGNIFICANT CHANGE UP
HCT VFR BLD CALC: 44.7 % — SIGNIFICANT CHANGE UP (ref 37–47)
HGB BLD-MCNC: 14.6 G/DL — SIGNIFICANT CHANGE UP (ref 12–16)
IMM GRANULOCYTES NFR BLD AUTO: 0.4 % — HIGH (ref 0.1–0.3)
KETONES UR-MCNC: NEGATIVE — SIGNIFICANT CHANGE UP
LACTATE SERPL-SCNC: 1.2 MMOL/L — SIGNIFICANT CHANGE UP (ref 0.7–2)
LEUKOCYTE ESTERASE UR-ACNC: NEGATIVE — SIGNIFICANT CHANGE UP
LYMPHOCYTES # BLD AUTO: 0.72 K/UL — LOW (ref 1.2–3.4)
LYMPHOCYTES # BLD AUTO: 7.1 % — LOW (ref 20.5–51.1)
MCHC RBC-ENTMCNC: 30 PG — SIGNIFICANT CHANGE UP (ref 27–31)
MCHC RBC-ENTMCNC: 32.7 G/DL — SIGNIFICANT CHANGE UP (ref 32–37)
MCV RBC AUTO: 91.8 FL — SIGNIFICANT CHANGE UP (ref 81–99)
MONOCYTES # BLD AUTO: 0.48 K/UL — SIGNIFICANT CHANGE UP (ref 0.1–0.6)
MONOCYTES NFR BLD AUTO: 4.8 % — SIGNIFICANT CHANGE UP (ref 1.7–9.3)
NEUTROPHILS # BLD AUTO: 8.8 K/UL — HIGH (ref 1.4–6.5)
NEUTROPHILS NFR BLD AUTO: 87.4 % — HIGH (ref 42.2–75.2)
NITRITE UR-MCNC: NEGATIVE — SIGNIFICANT CHANGE UP
NRBC # BLD: 0 /100 WBCS — SIGNIFICANT CHANGE UP (ref 0–0)
PH UR: 6 — SIGNIFICANT CHANGE UP (ref 5–8)
PLATELET # BLD AUTO: 227 K/UL — SIGNIFICANT CHANGE UP (ref 130–400)
POTASSIUM SERPL-MCNC: 3.3 MMOL/L — LOW (ref 3.5–5)
POTASSIUM SERPL-SCNC: 3.3 MMOL/L — LOW (ref 3.5–5)
PROT SERPL-MCNC: 7.1 G/DL — SIGNIFICANT CHANGE UP (ref 6–8)
PROT UR-MCNC: ABNORMAL MG/DL
RBC # BLD: 4.87 M/UL — SIGNIFICANT CHANGE UP (ref 4.2–5.4)
RBC # FLD: 13.5 % — SIGNIFICANT CHANGE UP (ref 11.5–14.5)
RBC CASTS # UR COMP ASSIST: ABNORMAL /HPF
SARS-COV-2 RNA SPEC QL NAA+PROBE: SIGNIFICANT CHANGE UP
SODIUM SERPL-SCNC: 138 MMOL/L — SIGNIFICANT CHANGE UP (ref 135–146)
SP GR SPEC: 1.02 — SIGNIFICANT CHANGE UP (ref 1.01–1.03)
UROBILINOGEN FLD QL: 0.2 MG/DL — SIGNIFICANT CHANGE UP (ref 0.2–0.2)
WBC # BLD: 10.07 K/UL — SIGNIFICANT CHANGE UP (ref 4.8–10.8)
WBC # FLD AUTO: 10.07 K/UL — SIGNIFICANT CHANGE UP (ref 4.8–10.8)
WBC UR QL: SIGNIFICANT CHANGE UP /HPF

## 2020-07-02 PROCEDURE — 74177 CT ABD & PELVIS W/CONTRAST: CPT | Mod: 26

## 2020-07-02 PROCEDURE — 99222 1ST HOSP IP/OBS MODERATE 55: CPT

## 2020-07-02 PROCEDURE — 99285 EMERGENCY DEPT VISIT HI MDM: CPT

## 2020-07-02 PROCEDURE — 71046 X-RAY EXAM CHEST 2 VIEWS: CPT | Mod: 26

## 2020-07-02 PROCEDURE — 70450 CT HEAD/BRAIN W/O DYE: CPT | Mod: 26

## 2020-07-02 RX ORDER — SODIUM CHLORIDE 9 MG/ML
1000 INJECTION, SOLUTION INTRAVENOUS ONCE
Refills: 0 | Status: COMPLETED | OUTPATIENT
Start: 2020-07-02 | End: 2020-07-02

## 2020-07-02 RX ORDER — CIPROFLOXACIN LACTATE 400MG/40ML
400 VIAL (ML) INTRAVENOUS ONCE
Refills: 0 | Status: COMPLETED | OUTPATIENT
Start: 2020-07-02 | End: 2020-07-02

## 2020-07-02 RX ORDER — MORPHINE SULFATE 50 MG/1
4 CAPSULE, EXTENDED RELEASE ORAL ONCE
Refills: 0 | Status: DISCONTINUED | OUTPATIENT
Start: 2020-07-02 | End: 2020-07-02

## 2020-07-02 RX ORDER — FAMOTIDINE 10 MG/ML
20 INJECTION INTRAVENOUS AT BEDTIME
Refills: 0 | Status: DISCONTINUED | OUTPATIENT
Start: 2020-07-02 | End: 2020-07-06

## 2020-07-02 RX ORDER — POTASSIUM CHLORIDE 20 MEQ
40 PACKET (EA) ORAL ONCE
Refills: 0 | Status: COMPLETED | OUTPATIENT
Start: 2020-07-02 | End: 2020-07-02

## 2020-07-02 RX ORDER — METOCLOPRAMIDE HCL 10 MG
10 TABLET ORAL ONCE
Refills: 0 | Status: COMPLETED | OUTPATIENT
Start: 2020-07-02 | End: 2020-07-02

## 2020-07-02 RX ORDER — LEVOTHYROXINE SODIUM 125 MCG
350 TABLET ORAL
Refills: 0 | Status: DISCONTINUED | OUTPATIENT
Start: 2020-07-02 | End: 2020-07-06

## 2020-07-02 RX ORDER — METRONIDAZOLE 500 MG
500 TABLET ORAL EVERY 8 HOURS
Refills: 0 | Status: DISCONTINUED | OUTPATIENT
Start: 2020-07-02 | End: 2020-07-06

## 2020-07-02 RX ORDER — KETOROLAC TROMETHAMINE 30 MG/ML
30 SYRINGE (ML) INJECTION ONCE
Refills: 0 | Status: DISCONTINUED | OUTPATIENT
Start: 2020-07-02 | End: 2020-07-02

## 2020-07-02 RX ORDER — ACETAMINOPHEN 500 MG
650 TABLET ORAL EVERY 6 HOURS
Refills: 0 | Status: DISCONTINUED | OUTPATIENT
Start: 2020-07-02 | End: 2020-07-06

## 2020-07-02 RX ORDER — SODIUM CHLORIDE 9 MG/ML
1000 INJECTION INTRAMUSCULAR; INTRAVENOUS; SUBCUTANEOUS ONCE
Refills: 0 | Status: COMPLETED | OUTPATIENT
Start: 2020-07-02 | End: 2020-07-02

## 2020-07-02 RX ORDER — PANTOPRAZOLE SODIUM 20 MG/1
40 TABLET, DELAYED RELEASE ORAL
Refills: 0 | Status: DISCONTINUED | OUTPATIENT
Start: 2020-07-02 | End: 2020-07-06

## 2020-07-02 RX ORDER — METRONIDAZOLE 500 MG
500 TABLET ORAL ONCE
Refills: 0 | Status: COMPLETED | OUTPATIENT
Start: 2020-07-02 | End: 2020-07-02

## 2020-07-02 RX ORDER — CIPROFLOXACIN LACTATE 400MG/40ML
400 VIAL (ML) INTRAVENOUS EVERY 12 HOURS
Refills: 0 | Status: DISCONTINUED | OUTPATIENT
Start: 2020-07-02 | End: 2020-07-04

## 2020-07-02 RX ORDER — LEVETIRACETAM 250 MG/1
1500 TABLET, FILM COATED ORAL
Refills: 0 | Status: DISCONTINUED | OUTPATIENT
Start: 2020-07-03 | End: 2020-07-06

## 2020-07-02 RX ADMIN — SODIUM CHLORIDE 1000 MILLILITER(S): 9 INJECTION, SOLUTION INTRAVENOUS at 21:09

## 2020-07-02 RX ADMIN — Medication 40 MILLIEQUIVALENT(S): at 21:09

## 2020-07-02 RX ADMIN — Medication 10 MILLIGRAM(S): at 19:47

## 2020-07-02 RX ADMIN — MORPHINE SULFATE 4 MILLIGRAM(S): 50 CAPSULE, EXTENDED RELEASE ORAL at 22:34

## 2020-07-02 RX ADMIN — Medication 30 MILLIGRAM(S): at 19:47

## 2020-07-02 RX ADMIN — Medication 200 MILLIGRAM(S): at 21:09

## 2020-07-02 RX ADMIN — Medication 100 MILLIGRAM(S): at 22:26

## 2020-07-02 RX ADMIN — SODIUM CHLORIDE 1000 MILLILITER(S): 9 INJECTION INTRAMUSCULAR; INTRAVENOUS; SUBCUTANEOUS at 18:13

## 2020-07-02 NOTE — H&P ADULT - HISTORY OF PRESENT ILLNESS
[age] female whose PMH includes Johnson's and lower GI "ulcer" presents to the ER 60yo female whose PMH includes Johnson's and lower GI "ulcer", AVM with craniotomy, cervical cancer and hypothyroidism  presents to the ER due to intermittent fevers over 3 days along with lower abdominal cramping pains. Also had headache not relieved with Tylenol. No nausea or vomiting

## 2020-07-02 NOTE — H&P ADULT - NSICDXPASTSURGICALHX_GEN_ALL_CORE_FT
PAST SURGICAL HISTORY:  Cessation of menses c section    H/O abdominal hysterectomy     H/O cerebral aneurysm repair     H/O Spinal surgery tumor removed plates in neck

## 2020-07-02 NOTE — ED PROVIDER NOTE - CARE PLAN
Principal Discharge DX:	Pancolitis  Secondary Diagnosis:	Acute nonintractable headache, unspecified headache type

## 2020-07-02 NOTE — ED PROVIDER NOTE - ATTENDING CONTRIBUTION TO CARE
I personally evaluated patient. I agree with the findings and plan with all documentation on chart except as documented  in my note.    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 She denies any sick contact, recent travel, or recent antibiotic use.  COVID test yesterday negative.  + Severe headache but no neck stiffness and patient has a normal neuro exam. + Lower back pain and patient has had lumbar surgery.    Concern given VS and patient had sepsis labs sent.  Headache and fever treated and patient given a total of 30 cc/kg of ideal body weight fluids in the ED. Lactate was 1.2. Blood cultures sent. Head CT shows no acute changes. CT abd/pelvis shows source of fever and patient has pan-colitis.  Patient given Cipro/Flagyl for coverage of GI source. Patients headache improved in the ED. Her pain improved.  WBC count reviewed and patient remains hemodynamically stable. Will admit for IV antibiotics and proper monitoring.  Patient does not require ICU evaluation at this time.    Patient spoken to in detail about results and plan of care.  All questions addressed.  Results of ED work up discussed and patient requires admission for further treatment and work up.

## 2020-07-02 NOTE — ED PROVIDER NOTE - OBJECTIVE STATEMENT
60 y/o female presents with fever intermittent over 3 days. patient denies any sick contact, recent travel, or recent antibx. patient with hx of AVM with craniotomy in past , hypothyroidism on synthroid. patient denies any cough or rashes. patient c/o headaches unrelieved with tylenol which patient took one hr pta. patient denies any light sensitivity, visual changes, tinnitus. patient c/o lower back pain and diarrhea which patient noted blood streaked a few days ago. patient c/o lower abdominal cramping. no nausea or vomiting. patient denies any dysuria or hematuria .

## 2020-07-02 NOTE — H&P ADULT - NSICDXPASTMEDICALHX_GEN_ALL_CORE_FT
PAST MEDICAL HISTORY:  Brain aneurysm     Brain tumor, recurrent     Cervical cancer     Hypothyroid     Spine anomaly     Uterine cancer, sarcoma     Vertigo

## 2020-07-02 NOTE — ED ADULT TRIAGE NOTE - CHIEF COMPLAINT QUOTE
Came in for complaints of chills, fever, back pain, headache, dizziness, diarrhea, weakness for 3 days. Tested for COVID and Was NEGATIVE

## 2020-07-02 NOTE — H&P ADULT - NSHPLABSRESULTS_GEN_ALL_CORE
< from: CT Abdomen and Pelvis w/ IV Cont (20 @ 19:40) >    EXAM:  CT ABDOMEN AND PELVIS IC          PROCEDURE DATE:  2020        < from: CT Abdomen and Pelvis w/ IV Cont (20 @ 19:40) >    IMPRESSION:    1.  Pancolitis, infectious or inflammatoryin etiology.  2.  1.9 cm splenic artery aneurysm     SUNITA TELLES M.D., ATTENDING RADIOLOGIST  This document has been electronically signed. 2020  8:35PM        < end of copied text >                          14.6   10.07 )-----------( 227      ( 2020 18:16 )             44.7     07-02    138  |  99  |  9<L>  ----------------------------<  116<H>  3.3<L>   |  28  |  1.1    Ca    9.1      2020 18:16    TPro  7.1  /  Alb  4.6  /  TBili  0.4  /  DBili  x   /  AST  24  /  ALT  19  /  AlkPhos  106  07-02          Urinalysis Basic - ( 2020 18:08 )    Color: Yellow / Appearance: Clear / S.020 / pH: x  Gluc: x / Ketone: Negative  / Bili: Negative / Urobili: 0.2 mg/dL   Blood: x / Protein: Trace mg/dL / Nitrite: Negative   Leuk Esterase: Negative / RBC: 6-10 /HPF / WBC 1-2 /HPF   Sq Epi: x / Non Sq Epi: Moderate /HPF / Bacteria: Few        Lactate Trend   @ 23:20 Lactate:1.2         CAPILLARY BLOOD GLUCOSE

## 2020-07-02 NOTE — ED PROVIDER NOTE - CLINICAL SUMMARY MEDICAL DECISION MAKING FREE TEXT BOX
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 She denies any sick contact, recent travel, or recent antibiotic use.  COVID test yesterday negative.  + Severe headache but no neck stiffness and patient has a normal neuro exam. + Lower back pain and patient has had lumbar surgery.    Concern given VS and patient had sepsis labs sent.  Headache and fever treated and patient given a total of 30 cc/kg of ideal body weight fluids in the ED. Lactate was 1.2. Blood cultures sent. Head CT shows no acute changes. CT abd/pelvis shows source of fever and patient has pan-colitis.  Patient given Cipro/Flagyl for coverage of GI source. Patients headache improved in the ED. Her pain improved.  WBC count reviewed and patient remains hemodynamically stable. Will admit for IV antibiotics and proper monitoring.  Patient does not require ICU evaluation at this time.    Patient spoken to in detail about results and plan of care.  All questions addressed.  Results of ED work up discussed and patient requires admission for further treatment and work up.

## 2020-07-02 NOTE — ED ADULT NURSE NOTE - NSIMPLEMENTINTERV_GEN_ALL_ED
Implemented All Universal Safety Interventions:  [place] to call system. Call bell, personal items and telephone within reach. Instruct patient to call for assistance. Room bathroom lighting operational. Non-slip footwear when patient is off stretcher. Physically safe environment: no spills, clutter or unnecessary equipment. Stretcher in lowest position, wheels locked, appropriate side rails in place.

## 2020-07-03 DIAGNOSIS — E03.9 HYPOTHYROIDISM, UNSPECIFIED: ICD-10-CM

## 2020-07-03 DIAGNOSIS — E87.6 HYPOKALEMIA: ICD-10-CM

## 2020-07-03 PROBLEM — I67.1 CEREBRAL ANEURYSM, NONRUPTURED: Chronic | Status: ACTIVE | Noted: 2018-03-15

## 2020-07-03 PROBLEM — C53.9 MALIGNANT NEOPLASM OF CERVIX UTERI, UNSPECIFIED: Chronic | Status: ACTIVE | Noted: 2018-03-15

## 2020-07-03 PROBLEM — D49.6 NEOPLASM OF UNSPECIFIED BEHAVIOR OF BRAIN: Chronic | Status: ACTIVE | Noted: 2018-03-15

## 2020-07-03 PROBLEM — C54.9 MALIGNANT NEOPLASM OF CORPUS UTERI, UNSPECIFIED: Chronic | Status: ACTIVE | Noted: 2018-03-15

## 2020-07-03 PROBLEM — Q76.49 OTHER CONGENITAL MALFORMATIONS OF SPINE, NOT ASSOCIATED WITH SCOLIOSIS: Chronic | Status: ACTIVE | Noted: 2018-03-15

## 2020-07-03 PROBLEM — R42 DIZZINESS AND GIDDINESS: Chronic | Status: ACTIVE | Noted: 2018-03-15

## 2020-07-03 LAB
ALBUMIN SERPL ELPH-MCNC: 3.6 G/DL — SIGNIFICANT CHANGE UP (ref 3.5–5.2)
ALP SERPL-CCNC: 78 U/L — SIGNIFICANT CHANGE UP (ref 30–115)
ALT FLD-CCNC: 12 U/L — SIGNIFICANT CHANGE UP (ref 0–41)
ANION GAP SERPL CALC-SCNC: 9 MMOL/L — SIGNIFICANT CHANGE UP (ref 7–14)
AST SERPL-CCNC: 16 U/L — SIGNIFICANT CHANGE UP (ref 0–41)
BILIRUB SERPL-MCNC: 0.3 MG/DL — SIGNIFICANT CHANGE UP (ref 0.2–1.2)
BUN SERPL-MCNC: 8 MG/DL — LOW (ref 10–20)
C DIFF BY PCR RESULT: NEGATIVE — SIGNIFICANT CHANGE UP
C DIFF TOX GENS STL QL NAA+PROBE: SIGNIFICANT CHANGE UP
CALCIUM SERPL-MCNC: 8.2 MG/DL — LOW (ref 8.5–10.1)
CHLORIDE SERPL-SCNC: 104 MMOL/L — SIGNIFICANT CHANGE UP (ref 98–110)
CO2 SERPL-SCNC: 24 MMOL/L — SIGNIFICANT CHANGE UP (ref 17–32)
CREAT SERPL-MCNC: 0.9 MG/DL — SIGNIFICANT CHANGE UP (ref 0.7–1.5)
CULTURE RESULTS: SIGNIFICANT CHANGE UP
GLUCOSE SERPL-MCNC: 126 MG/DL — HIGH (ref 70–99)
HCT VFR BLD CALC: 36.4 % — LOW (ref 37–47)
HCV AB S/CO SERPL IA: 0.03 COI — SIGNIFICANT CHANGE UP
HCV AB SERPL-IMP: SIGNIFICANT CHANGE UP
HGB BLD-MCNC: 12.3 G/DL — SIGNIFICANT CHANGE UP (ref 12–16)
MAGNESIUM SERPL-MCNC: 1.6 MG/DL — LOW (ref 1.8–2.4)
MCHC RBC-ENTMCNC: 30.4 PG — SIGNIFICANT CHANGE UP (ref 27–31)
MCHC RBC-ENTMCNC: 33.8 G/DL — SIGNIFICANT CHANGE UP (ref 32–37)
MCV RBC AUTO: 90.1 FL — SIGNIFICANT CHANGE UP (ref 81–99)
NRBC # BLD: 0 /100 WBCS — SIGNIFICANT CHANGE UP (ref 0–0)
PLATELET # BLD AUTO: 181 K/UL — SIGNIFICANT CHANGE UP (ref 130–400)
POTASSIUM SERPL-MCNC: 3.4 MMOL/L — LOW (ref 3.5–5)
POTASSIUM SERPL-SCNC: 3.4 MMOL/L — LOW (ref 3.5–5)
PROT SERPL-MCNC: 5.5 G/DL — LOW (ref 6–8)
RBC # BLD: 4.04 M/UL — LOW (ref 4.2–5.4)
RBC # FLD: 13.5 % — SIGNIFICANT CHANGE UP (ref 11.5–14.5)
SODIUM SERPL-SCNC: 137 MMOL/L — SIGNIFICANT CHANGE UP (ref 135–146)
SPECIMEN SOURCE: SIGNIFICANT CHANGE UP
WBC # BLD: 7.86 K/UL — SIGNIFICANT CHANGE UP (ref 4.8–10.8)
WBC # FLD AUTO: 7.86 K/UL — SIGNIFICANT CHANGE UP (ref 4.8–10.8)

## 2020-07-03 PROCEDURE — 99233 SBSQ HOSP IP/OBS HIGH 50: CPT

## 2020-07-03 PROCEDURE — 99223 1ST HOSP IP/OBS HIGH 75: CPT

## 2020-07-03 RX ORDER — IBUPROFEN 200 MG
600 TABLET ORAL EVERY 6 HOURS
Refills: 0 | Status: DISCONTINUED | OUTPATIENT
Start: 2020-07-03 | End: 2020-07-03

## 2020-07-03 RX ORDER — MAGNESIUM SULFATE 500 MG/ML
1 VIAL (ML) INJECTION ONCE
Refills: 0 | Status: COMPLETED | OUTPATIENT
Start: 2020-07-03 | End: 2020-07-03

## 2020-07-03 RX ORDER — POTASSIUM CHLORIDE 20 MEQ
20 PACKET (EA) ORAL
Refills: 0 | Status: COMPLETED | OUTPATIENT
Start: 2020-07-03 | End: 2020-07-03

## 2020-07-03 RX ORDER — IBUPROFEN 200 MG
400 TABLET ORAL EVERY 6 HOURS
Refills: 0 | Status: DISCONTINUED | OUTPATIENT
Start: 2020-07-03 | End: 2020-07-03

## 2020-07-03 RX ORDER — OXYCODONE AND ACETAMINOPHEN 5; 325 MG/1; MG/1
1 TABLET ORAL EVERY 6 HOURS
Refills: 0 | Status: DISCONTINUED | OUTPATIENT
Start: 2020-07-03 | End: 2020-07-06

## 2020-07-03 RX ADMIN — PANTOPRAZOLE SODIUM 40 MILLIGRAM(S): 20 TABLET, DELAYED RELEASE ORAL at 05:26

## 2020-07-03 RX ADMIN — Medication 200 MILLIGRAM(S): at 05:25

## 2020-07-03 RX ADMIN — Medication 100 MILLIGRAM(S): at 14:00

## 2020-07-03 RX ADMIN — Medication 20 MILLIEQUIVALENT(S): at 11:32

## 2020-07-03 RX ADMIN — Medication 350 MICROGRAM(S): at 05:26

## 2020-07-03 RX ADMIN — Medication 100 MILLIGRAM(S): at 22:07

## 2020-07-03 RX ADMIN — FAMOTIDINE 20 MILLIGRAM(S): 10 INJECTION INTRAVENOUS at 22:09

## 2020-07-03 RX ADMIN — Medication 100 GRAM(S): at 10:37

## 2020-07-03 RX ADMIN — Medication 650 MILLIGRAM(S): at 04:26

## 2020-07-03 RX ADMIN — Medication 100 MILLIGRAM(S): at 06:46

## 2020-07-03 RX ADMIN — Medication 20 MILLIEQUIVALENT(S): at 10:45

## 2020-07-03 RX ADMIN — Medication 200 MILLIGRAM(S): at 19:06

## 2020-07-03 RX ADMIN — LEVETIRACETAM 1500 MILLIGRAM(S): 250 TABLET, FILM COATED ORAL at 19:05

## 2020-07-03 RX ADMIN — Medication 600 MILLIGRAM(S): at 08:35

## 2020-07-03 RX ADMIN — OXYCODONE AND ACETAMINOPHEN 1 TABLET(S): 5; 325 TABLET ORAL at 10:45

## 2020-07-03 RX ADMIN — OXYCODONE AND ACETAMINOPHEN 1 TABLET(S): 5; 325 TABLET ORAL at 20:36

## 2020-07-03 RX ADMIN — LEVETIRACETAM 1500 MILLIGRAM(S): 250 TABLET, FILM COATED ORAL at 05:26

## 2020-07-03 NOTE — PROGRESS NOTE ADULT - ASSESSMENT
Patient is 60 yo female with hx of Brain aneurysm, Brain tumor, recurrent, Cervical cancer, Hypothyroid, Spine anomaly, Uterine cancer, sarcoma and Vertigo presenting with       1.  Acute pancolitis  -BC pending  -Obtain stool culture, and Stool occult  -Clear diet  -IVF  -IV antibiotic  -GI consult    2. splenic artery aneurysm  -Vascular consult       3. Hypothyroidism  -Continue with levothyroxine    4. hx of brain cancer  -Continue with keppra        #Progress Note Handoff  Pending (specify):  as above  Family discussion:  plan of care was discussed with patient in details.  all questions were answered.  seems to understand, and in agreement  Disposition:  unknown

## 2020-07-03 NOTE — CONSULT NOTE ADULT - SUBJECTIVE AND OBJECTIVE BOX
JOSIAS QUAN [id number]  59y Female  1d    HPI:  58yo female whose PMH includes Johnson's and lower GI "ulcer", AVM with craniotomy, cervical cancer and hypothyroidism  presents to the ER due to intermittent fevers over 3 days along with lower abdominal cramping pains. Also had headache not relieved with Tylenol. No nausea or vomiting (2020 21:08)  CT A/P shows pancolitis as well as 1/9 cm splenic artery aneurysm.     VASC:  Pt with improved abdominal pain; still with some diarrhea. She has hx AVM/craniotomy in  & ; resection of benign brain tumor in       PAST MEDICAL & SURGICAL HISTORY:  Hypothyroid  Spine anomaly  Uterine cancer, sarcoma  Cervical cancer  Brain tumor, recurrent  Brain aneurysm  Vertigo  Cessation of menses: c section  H/O Spinal surgery: tumor removed plates in neck  H/O cerebral aneurysm repair  H/O abdominal hysterectomy        MEDICATIONS  (STANDING):  ciprofloxacin   IVPB 400 milliGRAM(s) IV Intermittent every 12 hours  famotidine    Tablet 20 milliGRAM(s) Oral at bedtime  levETIRAcetam 1500 milliGRAM(s) Oral two times a day  levothyroxine 350 MICROGram(s) Oral <User Schedule>  metroNIDAZOLE  IVPB 500 milliGRAM(s) IV Intermittent every 8 hours  pantoprazole    Tablet 40 milliGRAM(s) Oral before breakfast    MEDICATIONS  (PRN):  acetaminophen   Tablet .. 650 milliGRAM(s) Oral every 6 hours PRN Mild Pain (1 - 3)  oxycodone    5 mG/acetaminophen 325 mG 1 Tablet(s) Oral every 6 hours PRN Severe Pain (7 - 10)      Allergies    No Known Allergies    Intolerances        REVIEW OF SYSTEMS    [ ] A ten-point review of systems was otherwise negative except as noted.  [ ] Due to altered mental status/intubation, subjective information were not able to be obtained from the patient. History was obtained, to the extent possible, from review of the chart and collateral sources of information.      Vital Signs Last 24 Hrs  T(C): 37.2 (2020 06:40), Max: 38.3 (2020 05:21)  T(F): 98.9 (2020 06:40), Max: 100.9 (2020 05:21)  HR: 88 (2020 05:21) (88 - 107)  BP: 134/70 (2020 05:21) (127/56 - 160/70)  BP(mean): --  RR: 16 (2020 05:21) (16 - 18)  SpO2: 97% (2020 23:27) (95% - 97%)    PHYSICAL EXAM:  GENERAL: NAD, well-appearing  ABDOMEN: Soft, Nontender, Nondistended;     LABS:                        12.3   7.86  )-----------( 181      ( 2020 07:11 )             36.4       Auto Neutrophil %: 87.4 % (20 @ 18:16)  Auto Immature Granulocyte %: 0.4 % (20 @ 18:16)        137  |  104  |  8<L>  ----------------------------<  126<H>  3.4<L>   |  24  |  0.9      Calcium, Total Serum: 8.2 mg/dL (20 @ 07:11)      LFTs:             5.5  | 0.3  | 16       ------------------[78      ( 2020 07:11 )  3.6  | x    | 12          Lipase:x      Amylase:x         Lactate, Blood: 1.2 mmol/L (20 @ 23:20)      Coags:      Urinalysis Basic - ( 2020 18:08 )    Color: Yellow / Appearance: Clear / S.020 / pH: x  Gluc: x / Ketone: Negative  / Bili: Negative / Urobili: 0.2 mg/dL   Blood: x / Protein: Trace mg/dL / Nitrite: Negative   Leuk Esterase: Negative / RBC: 6-10 /HPF / WBC 1-2 /HPF   Sq Epi: x / Non Sq Epi: Moderate /HPF / Bacteria: Few    RADIOLOGY & ADDITIONAL STUDIES:    CT Abdomen and Pelvis w/ IV Cont (20 @ 19:40) >  IMPRESSION:    1.  Pancolitis, infectious or inflammatoryin etiology.  2.  1.9 cm splenic artery aneurysm

## 2020-07-03 NOTE — CONSULT NOTE ADULT - SUBJECTIVE AND OBJECTIVE BOX
Chief complaint/Reason for consult: pancolitis     HPI:  58yo female whose PMH includes Johnson's and lower GI "ulcer", AVM with craniotomy, cervical cancer and hypothyroidism  presents to the ER due to intermittent fevers over 3 days along with lower abdominal cramping pains. Also had headache not relieved with Tylenol. No nausea or vomiting (02 Jul 2020 21:08)    GI Updates; 59yFemale pmh UC diagnosed by Dr. Mott 5-6 years ago, hypothyroidism. Vertigo presents with RLQ and LLQ abdominal pain for 3 days that was 10/10 not exacerbated by food. Patient reports diarrhea 2-3 episodes a day watery brown without blood. Patient reports baseline she has 1 or 2 bowel movements daily formed without blood. Patient not reports her pain is completely gone. Patient denies nausea, vomiting, hematemesis, melena, blood in stool, diarrhea, constipation, abdominal pain. patient states this does not feel like a UC flare.      PAST MEDICAL & SURGICAL HISTORY:  Hypothyroid  Spine anomaly  Uterine cancer, sarcoma  Cervical cancer  Brain tumor, recurrent  Brain aneurysm  Vertigo  Cessation of menses: c section  H/O Spinal surgery: tumor removed plates in neck  H/O cerebral aneurysm repair  H/O abdominal hysterectomy        Family history:  FAMILY HISTORY:  No pertinent family history in first degree relatives    No GI cancers in first or second degree relatives    Social History: No smoking. No alcohol. No illegal drug use.    Allergies:   No Known Allergies        MEDICATIONS: Home Medications:  famotidine:  (02 Jul 2020 23:41)  Keppra 250 mg oral tablet: 12 tab(s) orally once a day (02 Jul 2020 23:41)  levothyroxine 300 mcg (0.3 mg) oral tablet: 1 tab(s) orally once a day (02 Jul 2020 23:41)  levothyroxine 50 mcg (0.05 mg) oral tablet: 1 tab(s) orally once a day (02 Jul 2020 23:41)  omeprazole 40 mg oral delayed release capsule: 1 cap(s) orally once a day (02 Jul 2020 23:41)  Premarin 0.625 mg oral tablet:  (02 Jul 2020 23:41)    MEDICATIONS  (STANDING):  ciprofloxacin   IVPB 400 milliGRAM(s) IV Intermittent every 12 hours  famotidine    Tablet 20 milliGRAM(s) Oral at bedtime  levETIRAcetam 1500 milliGRAM(s) Oral two times a day  levothyroxine 350 MICROGram(s) Oral <User Schedule>  metroNIDAZOLE  IVPB 500 milliGRAM(s) IV Intermittent every 8 hours  pantoprazole    Tablet 40 milliGRAM(s) Oral before breakfast    MEDICATIONS  (PRN):  acetaminophen   Tablet .. 650 milliGRAM(s) Oral every 6 hours PRN Mild Pain (1 - 3)  oxycodone    5 mG/acetaminophen 325 mG 1 Tablet(s) Oral every 6 hours PRN Severe Pain (7 - 10)        REVIEW OF SYSTEMS  General:  No weight loss, fevers, or chills.  Eyes:  No reported pain or visual changes  ENT:  No sore throat or runny nose.  NECK: No stiffness or lymphadenopathy  CV:  No chest pain or palpitations.  Resp:  No shortness of breath, cough, wheezing or hemoptysis  GI:  No abdominal pain, nausea, vomiting, dysphagia, diarrhea or constipation. No rectal bleeding, melena, or hematemesis.  Muscle:  No aches or weakness  Neuro:  No tingling, numbness       VITALS:   T(F): 98.9 (07-03-20 @ 06:40), Max: 100.9 (07-03-20 @ 05:21)  HR: 88 (07-03-20 @ 05:21) (88 - 107)  BP: 134/70 (07-03-20 @ 05:21) (127/56 - 160/70)  RR: 16 (07-03-20 @ 05:21) (16 - 18)  SpO2: 97% (07-02-20 @ 23:27) (95% - 97%)    PHYSICAL EXAM:  GENERAL: AAOx3, no acute distress.  HEAD:  Atraumatic, Normocephalic  EYES: conjunctiva and sclera clear  NECK: Supple, No thyromegaly   CHEST/LUNG: Clear to auscultation bilaterally; No wheeze, rhonchi, or rales  HEART: Regular rate and rhythm; normal S1, S2, No murmurs.  ABDOMEN: Soft, nontender, nondistended; Bowel sounds present, no abdominal bruit, masses, or hepatosplenomegaly  NEUROLOGY: No asterixis or tremor  SKIN: Intact, no jaundice          LABS:  07-03    137  |  104  |  8<L>  ----------------------------<  126<H>  3.4<L>   |  24  |  0.9    Ca    8.2<L>      03 Jul 2020 07:11  Mg     1.6     07-03    TPro  5.5<L>  /  Alb  3.6  /  TBili  0.3  /  DBili  x   /  AST  16  /  ALT  12  /  AlkPhos  78  07-03                          12.3   7.86  )-----------( 181      ( 03 Jul 2020 07:11 )             36.4     LIVER FUNCTIONS - ( 03 Jul 2020 07:11 )  Alb: 3.6 g/dL / Pro: 5.5 g/dL / ALK PHOS: 78 U/L / ALT: 12 U/L / AST: 16 U/L / GGT: x               IMAGING:  < from: CT Abdomen and Pelvis w/ IV Cont (07.02.20 @ 19:40) >  EXAM:  CT ABDOMEN AND PELVIS IC            PROCEDURE DATE:  07/02/2020            INTERPRETATION:  CLINICAL HISTORY: Abdominal pain. Fever.    TECHNIQUE: Contiguous axial CT images were obtained from the lower chest to the pubic symphysis followingadministration of Optiray intravenous contrast. Oral contrast was not administered. Reformatted images in the coronal and sagittal planes were acquired.    COMPARISON: None.      FINDINGS:    LOWER CHEST: Dependent atelectasis    HEPATOBILIARY: Unremarkable.    SPLEEN: Unremarkable.    PANCREAS: Unremarkable.    ADRENAL GLANDS: Nodular thickening of the left adrenal gland.     KIDNEYS: Nonobstructing left renal calculus. No hydronephrosis.    ABDOMINOPELVIC NODES: Unremarkable    PELVIC ORGANS: Status post hysterectomy. Underdistended bladder limiting evaluation.    PERITONEUM/MESENTERY/BOWEL: Diffuse colonic wall thickening with surrounding fat stranding, compatible with pancolitis. No evidence of bowel obstruction. Unremarkable appendix. Colonic diverticulosis. No free air or fluid collection.    BONES/SOFT TISSUES: Degenerative changes of the spine. Left-sided L5 pars defect.    OTHER: 1.9 cm splenic artery aneurysm (4/124)      IMPRESSION:    1.  Pancolitis, infectious or inflammatoryin etiology.  2.  1.9 cm splenic artery aneurysm                   SUNITA TELLES M.D., ATTENDING RADIOLOGIST  This document has been electronically signed. Jul 2 2020  8:35PM        < end of copied text > Chief complaint/Reason for consult: pancolitis     HPI:  58yo female whose PMH includes Johnson's and lower GI "ulcer", AVM with craniotomy, cervical cancer and hypothyroidism  presents to the ER due to intermittent fevers over 3 days along with lower abdominal cramping pains. Also had headache not relieved with Tylenol. No nausea or vomiting (02 Jul 2020 21:08)    GI Updates; 59yFemale pmh UC diagnosed by Dr. Mott 5-6 years ago, hypothyroidism. Vertigo presents with RLQ and LLQ abdominal pain for 3 days that was 10/10 not exacerbated by food. Patient reports diarrhea 2-3 episodes a day watery brown without blood. Patient reports baseline she has 1 or 2 bowel movements daily formed without blood. Patient not reports her pain is completely gone. Patient denies nausea, vomiting, hematemesis, melena, blood in stool, diarrhea, constipation, abdominal pain. patient states this does not feel like a UC flare. No recent abx      PAST MEDICAL & SURGICAL HISTORY:  Hypothyroid  Spine anomaly  Uterine cancer, sarcoma  Cervical cancer  Brain tumor, recurrent  Brain aneurysm  Vertigo  Cessation of menses: c section  H/O Spinal surgery: tumor removed plates in neck  H/O cerebral aneurysm repair  H/O abdominal hysterectomy        Family history:  FAMILY HISTORY:  No pertinent family history in first degree relatives    No GI cancers in first or second degree relatives    Social History: No smoking. No alcohol. No illegal drug use.    Allergies:   No Known Allergies        MEDICATIONS: Home Medications:  famotidine:  (02 Jul 2020 23:41)  Keppra 250 mg oral tablet: 12 tab(s) orally once a day (02 Jul 2020 23:41)  levothyroxine 300 mcg (0.3 mg) oral tablet: 1 tab(s) orally once a day (02 Jul 2020 23:41)  levothyroxine 50 mcg (0.05 mg) oral tablet: 1 tab(s) orally once a day (02 Jul 2020 23:41)  omeprazole 40 mg oral delayed release capsule: 1 cap(s) orally once a day (02 Jul 2020 23:41)  Premarin 0.625 mg oral tablet:  (02 Jul 2020 23:41)    MEDICATIONS  (STANDING):  ciprofloxacin   IVPB 400 milliGRAM(s) IV Intermittent every 12 hours  famotidine    Tablet 20 milliGRAM(s) Oral at bedtime  levETIRAcetam 1500 milliGRAM(s) Oral two times a day  levothyroxine 350 MICROGram(s) Oral <User Schedule>  metroNIDAZOLE  IVPB 500 milliGRAM(s) IV Intermittent every 8 hours  pantoprazole    Tablet 40 milliGRAM(s) Oral before breakfast    MEDICATIONS  (PRN):  acetaminophen   Tablet .. 650 milliGRAM(s) Oral every 6 hours PRN Mild Pain (1 - 3)  oxycodone    5 mG/acetaminophen 325 mG 1 Tablet(s) Oral every 6 hours PRN Severe Pain (7 - 10)        REVIEW OF SYSTEMS  General:  No weight loss, fevers, or chills.  Eyes:  No reported pain or visual changes  ENT:  No sore throat or runny nose.  NECK: No stiffness or lymphadenopathy  CV:  No chest pain or palpitations.  Resp:  No shortness of breath, cough, wheezing or hemoptysis  GI:  No abdominal pain, nausea, vomiting, dysphagia, diarrhea or constipation. No rectal bleeding, melena, or hematemesis.  Muscle:  No aches or weakness  Neuro:  No tingling, numbness       VITALS:   T(F): 98.9 (07-03-20 @ 06:40), Max: 100.9 (07-03-20 @ 05:21)  HR: 88 (07-03-20 @ 05:21) (88 - 107)  BP: 134/70 (07-03-20 @ 05:21) (127/56 - 160/70)  RR: 16 (07-03-20 @ 05:21) (16 - 18)  SpO2: 97% (07-02-20 @ 23:27) (95% - 97%)    PHYSICAL EXAM:  GENERAL: AAOx3, no acute distress.  HEAD:  Atraumatic, Normocephalic  EYES: conjunctiva and sclera clear  NECK: Supple, No thyromegaly   CHEST/LUNG: Clear to auscultation bilaterally; No wheeze, rhonchi, or rales  HEART: Regular rate and rhythm; normal S1, S2, No murmurs.  ABDOMEN: Soft, nontender, nondistended; Bowel sounds present, no abdominal bruit, masses, or hepatosplenomegaly  NEUROLOGY: No asterixis or tremor  SKIN: Intact, no jaundice          LABS:  07-03    137  |  104  |  8<L>  ----------------------------<  126<H>  3.4<L>   |  24  |  0.9    Ca    8.2<L>      03 Jul 2020 07:11  Mg     1.6     07-03    TPro  5.5<L>  /  Alb  3.6  /  TBili  0.3  /  DBili  x   /  AST  16  /  ALT  12  /  AlkPhos  78  07-03                          12.3   7.86  )-----------( 181      ( 03 Jul 2020 07:11 )             36.4     LIVER FUNCTIONS - ( 03 Jul 2020 07:11 )  Alb: 3.6 g/dL / Pro: 5.5 g/dL / ALK PHOS: 78 U/L / ALT: 12 U/L / AST: 16 U/L / GGT: x               IMAGING:  < from: CT Abdomen and Pelvis w/ IV Cont (07.02.20 @ 19:40) >  EXAM:  CT ABDOMEN AND PELVIS IC            PROCEDURE DATE:  07/02/2020            INTERPRETATION:  CLINICAL HISTORY: Abdominal pain. Fever.    TECHNIQUE: Contiguous axial CT images were obtained from the lower chest to the pubic symphysis followingadministration of Optiray intravenous contrast. Oral contrast was not administered. Reformatted images in the coronal and sagittal planes were acquired.    COMPARISON: None.      FINDINGS:    LOWER CHEST: Dependent atelectasis    HEPATOBILIARY: Unremarkable.    SPLEEN: Unremarkable.    PANCREAS: Unremarkable.    ADRENAL GLANDS: Nodular thickening of the left adrenal gland.     KIDNEYS: Nonobstructing left renal calculus. No hydronephrosis.    ABDOMINOPELVIC NODES: Unremarkable    PELVIC ORGANS: Status post hysterectomy. Underdistended bladder limiting evaluation.    PERITONEUM/MESENTERY/BOWEL: Diffuse colonic wall thickening with surrounding fat stranding, compatible with pancolitis. No evidence of bowel obstruction. Unremarkable appendix. Colonic diverticulosis. No free air or fluid collection.    BONES/SOFT TISSUES: Degenerative changes of the spine. Left-sided L5 pars defect.    OTHER: 1.9 cm splenic artery aneurysm (4/124)      IMPRESSION:    1.  Pancolitis, infectious or inflammatoryin etiology.  2.  1.9 cm splenic artery aneurysm                   SUNITA TELLES M.D., ATTENDING RADIOLOGIST  This document has been electronically signed. Jul 2 2020  8:35PM        < end of copied text >

## 2020-07-03 NOTE — CONSULT NOTE ADULT - ASSESSMENT
59yFemale pmh UC diagnosed by Dr. Mott 5-6 years ago, hypothyroidism. Vertigo presents with RLQ and LLQ abdominal pain for 3 days that was 10/10 not exacerbated by food. Patient reports diarrhea 2-3 episodes a day watery brown without blood. Patient reports baseline she has 1 or 2 bowel movements daily formed without blood.    Problem 1-Pancolitis UC diagnosed by Dr. Mott mild diarrhea no blood   Rec  -C-Diff and GI PCR  -Will reach out to Dr. Mott's office to obtain records of last EGD/Colonsocopy with path  -Doubt UC flare   -At current symptoms have resolved  -Diet as tolerated  -Steroids not indicated  -Follow up with patient's private GI Dr. Mott outpatient     Problem 2-  1.9 cm splenic artery aneurysm   Rec  - Care as per primary team and vascular team    Problem 3-Theodore's esophagus history  Rec  -Will reach out to Dr. Dupree's office to obtain records of last EGD with path 59yFemale pmh UC diagnosed by Dr. Mott 5-6 years ago, hypothyroidism. Vertigo presents with RLQ and LLQ abdominal pain for 3 days that was 10/10 not exacerbated by food. Patient reports diarrhea 2-3 episodes a day watery brown without blood. Patient reports baseline she has 1 or 2 bowel movements daily formed without blood.    Problem 1-Pancolitis UC diagnosed by Dr. Mott mild diarrhea no blood   Rec  -C-Diff and GI PCR  -Will reach out to Dr. Mott's office to obtain records of last EGD/Colonsocopy with path  -Doubt UC flare   -At current symptoms have resolved  -Diet as tolerated  -Steroids not indicated  -Follow up with patient's private GI Dr. Mott outpatient     Problem 2-  1.9 cm splenic artery aneurysm   Rec  - Care as per primary team and vascular team    Problem 3-Theodore's esophagus history  Rec  -Will reach out to Dr. Dupree's office to obtain records of last EGD with path  -Follow up with patient's private GI Dr. Mott and continue PPI therapy 59yFemale pmh UC diagnosed by Dr. Mott 5-6 years ago, hypothyroidism. Vertigo presents with RLQ and LLQ abdominal pain for 3 days that was 10/10 not exacerbated by food. Patient reports diarrhea 2-3 episodes a day watery brown without blood. Patient reports baseline she has 1 or 2 bowel movements daily formed without blood.    Problem 1-Pancolitis UC diagnosed by Dr. Mott mild diarrhea no blood   abdominal pain---->resolved   -Doubt U/C flare  Rec  -C-Diff and GI PCR  -Will reach out to Dr. Mott's office to obtain records of last EGD/Colonsocopy with path  -Doubt UC flare   -At current symptoms have resolved  -Diet as tolerated  -Steroids not indicated  -Follow up with patient's private GI Dr. Mott outpatient     Problem 2-  1.9 cm splenic artery aneurysm   Rec  - Care as per primary team and vascular team    Problem 3-Theodore's esophagus history  Rec  -Will reach out to Dr. Dupree's office to obtain records of last EGD with path  -Follow up with patient's private GI Dr. Mott and continue PPI therapy 59yFemale pmh UC diagnosed by Dr. Mott 5-6 years ago, hypothyroidism. Vertigo presents with RLQ and LLQ abdominal pain for 3 days that was 10/10 not exacerbated by food. Patient reports diarrhea 2-3 episodes a day watery brown without blood. Patient reports baseline she has 1 or 2 bowel movements daily formed without blood.    Obtained latest EGD/Colonoscopy records from Dr. Mott as below  EGD 7/6/2019 Dr. Mott  Impressions  -Erosive Esophagitis stage 1   -small hiatal hernia   -flat gastric polyp 0.5cm biopsied  -Erosive gastritis with multiple erosions  -Moderate duodenitis   Pathology remarkable for esophageal biopsies revealing early intestinal metaplasia consistent with non-goblet cell type pettit's esophagus no dysplasia  negative H-pylori     Colonoscopy 6/7/18 Dr. Mott  Impressions  -Second degree hemorrhoids  -Diverticulosis moderate   -1cm flat polyp removed from sigmoid   Pathology polyp is hyperplastic, biopsies without acute or chronic colitis path negative for dysplasia     Problem 1-Pancolitis UC diagnosed by Dr. Mott as per patient, mild diarrhea no blood   abdominal pain---->resolved   -Doubt U/C flare  -No evidence of IBD on Prior Colonoscopy record 2018  -Pancolitis of possible infectious etiology   Rec  -C-Diff and GI PCR  -Doubt UC flare   -At current symptoms have resolved  -Diet as tolerated  -Steroids not indicated  -No acute GI intervention planned   -Follow up with patient's private GI Dr. Mott outpatient to consider repeat colonoscopy if benefits outweigh risks     Problem 2-  1.9 cm splenic artery aneurysm   Rec  - Care as per primary team and vascular team    Problem 3-Theodore's esophagus history  Rec  -Follow up with patient's private GI Dr. Mott and continue PPI therapy 59yFemale pmh UC diagnosed by Dr. Mott 5-6 years ago, hypothyroidism. Vertigo presents with RLQ and LLQ abdominal pain for 3 days that was 10/10 not exacerbated by food. Patient reports diarrhea 2-3 episodes a day watery brown without blood. Patient reports baseline she has 1 or 2 bowel movements daily formed without blood.    Obtained latest EGD/Colonoscopy records from Dr. Mott as below  EGD 7/6/2019 Dr. Mott  Impressions  -Erosive Esophagitis stage 1   -small hiatal hernia   -flat gastric polyp 0.5cm biopsied  -Erosive gastritis with multiple erosions  -Moderate duodenitis   Pathology remarkable for esophageal biopsies revealing early intestinal metaplasia consistent with non-goblet cell type pettit's esophagus no dysplasia  negative H-pylori     Colonoscopy 6/7/18 Dr. Mott  Impressions  -Second degree hemorrhoids  -Diverticulosis moderate   -1cm flat polyp removed from sigmoid   Pathology polyp is hyperplastic, biopsies without acute or chronic colitis path negative for dysplasia     Problem 1-Pancolitis UC diagnosed by Dr. Mott as per patient, mild diarrhea no blood   abdominal pain---->resolved   -No evidence of IBD on Prior Colonoscopy record 2018  -Pancolitis of possible infectious etiology   Rec  -C-Diff and GI PCR  -Doubt UC flare   -At current symptoms have resolved  -Diet as tolerated  -Steroids not indicated  -No acute GI intervention planned   -Follow up with patient's private GI Dr. Mott outpatient to consider repeat colonoscopy if benefits outweigh risks s/p resolution of pancolitis     Problem 2-  1.9 cm splenic artery aneurysm   Rec  - Care as per primary team and vascular team    Problem 3-Theodore's esophagus history  Rec  -Follow up with patient's private GI Dr. Mott and continue PPI therapy

## 2020-07-03 NOTE — CONSULT NOTE ADULT - ASSESSMENT
59 yr old female admitted for abdominal pain, diarrhea - has pancolitis. Also with incidental finding of splenic artery aneurysm. 59 yr old female admitted for abdominal pain, diarrhea - has pancolitis. Also with incidental finding of splenic artery aneurysm.      Case D/W vascular fellow, Dr. Nix:    - no acute vascular intervention   - will need outpatient surveiilance; recommend to F/U in office with Dr. Medrano in 2 weeks for further recommendations [phone]

## 2020-07-03 NOTE — PROGRESS NOTE ADULT - SUBJECTIVE AND OBJECTIVE BOX
CC.  Abdm pain  HPI.  Patient reports abdm pain resolving.  + Diarrhea, and fever overnight  offers no other complaints              Constitutional: No fatigue or weight loss.  Skin: No rash.  Eyes: No recent vision problems or eye pain.  ENT: No congestion, ear pain, or sore throat.  Endocrine: No thyroid problems.  Cardiovascular: No chest pain or palpation.  Respiratory: No cough, shortness of breath, congestion, or wheezing.  Gastrointestinal: No  nausea, vomiting,   Genitourinary: No dysuria.  Musculoskeletal: No joint swelling.  Neurologic: No headache.      Vital Signs Last 24 Hrs  T(C): 37.2 (20 @ 06:40), Max: 38.3 (20 @ 05:21)  T(F): 98.9 (20 @ 06:40), Max: 100.9 (20 @ 05:21)  HR: 88 (20 @ 05:21) (88 - 107)  BP: 134/70 (20 @ 05:21) (127/56 - 160/70)  BP(mean): --  RR: 16 (20 @ 05:21) (16 - 18)  SpO2: 97% (20 @ 23:27) (95% - 97%)        PHYSICAL EXAM-  GENERAL: NAD, chronic ill appearing female  HEAD:  Atraumatic, Normocephalic  EYES: EOMI, PERRLA, conjunctiva and sclera clear  NECK: Supple, No JVD, Normal thyroid  NERVOUS SYSTEM:  Alert & Oriented X3, Moving all extremities  CHEST/LUNG: Clear to percussion bilaterally; No rales, rhonchi, wheezing, or rubs  HEART: Regular rate and rhythm; No murmurs, rubs, or gallops  ABDOMEN: Soft, Nontender, Nondistended; hyperactive BS  EXTREMITIES:   No clubbing, cyanosis, or edema  SKIN: No rashes or lesions                                  12.3   7.86  )-----------( 181      ( 2020 07:11 )             36.4         137  |  104  |  8<L>  ----------------------------<  126<H>  3.4<L>   |  24  |  0.9    Ca    8.2<L>      2020 07:11  Mg     1.6     07-03    TPro  5.5<L>  /  Alb  3.6  /  TBili  0.3  /  DBili  x   /  AST  16  /  ALT  12  /  AlkPhos  78  07-03          Urinalysis Basic - ( 2020 18:08 )    Color: Yellow / Appearance: Clear / S.020 / pH: x  Gluc: x / Ketone: Negative  / Bili: Negative / Urobili: 0.2 mg/dL   Blood: x / Protein: Trace mg/dL / Nitrite: Negative   Leuk Esterase: Negative / RBC: 6-10 /HPF / WBC 1-2 /HPF   Sq Epi: x / Non Sq Epi: Moderate /HPF / Bacteria: Few              MEDICATIONS  (STANDING):  ciprofloxacin   IVPB 400 milliGRAM(s) IV Intermittent every 12 hours  famotidine    Tablet 20 milliGRAM(s) Oral at bedtime  levETIRAcetam 1500 milliGRAM(s) Oral two times a day  levothyroxine 350 MICROGram(s) Oral <User Schedule>  magnesium sulfate  IVPB 1 Gram(s) IV Intermittent once  metroNIDAZOLE  IVPB 500 milliGRAM(s) IV Intermittent every 8 hours  pantoprazole    Tablet 40 milliGRAM(s) Oral before breakfast  potassium chloride    Tablet ER 20 milliEquivalent(s) Oral every 2 hours    MEDICATIONS  (PRN):  acetaminophen   Tablet .. 650 milliGRAM(s) Oral every 6 hours PRN Mild Pain (1 - 3)  oxycodone    5 mG/acetaminophen 325 mG 1 Tablet(s) Oral every 6 hours PRN Severe Pain (7 - 10)          Imaging Personally Reviewed:     [x ] YES  [ ] NO    Consultant(s) Notes Reviewed:  [x ] YES  [ ] NO    Care Discussed with Consultants/Other Providers [x ] YES  [ ] No medical contraindication for discharge

## 2020-07-04 LAB
ALBUMIN SERPL ELPH-MCNC: 3.3 G/DL — LOW (ref 3.5–5.2)
ALP SERPL-CCNC: 76 U/L — SIGNIFICANT CHANGE UP (ref 30–115)
ALT FLD-CCNC: 14 U/L — SIGNIFICANT CHANGE UP (ref 0–41)
ANION GAP SERPL CALC-SCNC: 8 MMOL/L — SIGNIFICANT CHANGE UP (ref 7–14)
AST SERPL-CCNC: 17 U/L — SIGNIFICANT CHANGE UP (ref 0–41)
BILIRUB SERPL-MCNC: 0.3 MG/DL — SIGNIFICANT CHANGE UP (ref 0.2–1.2)
BUN SERPL-MCNC: 6 MG/DL — LOW (ref 10–20)
CALCIUM SERPL-MCNC: 8.9 MG/DL — SIGNIFICANT CHANGE UP (ref 8.5–10.1)
CHLORIDE SERPL-SCNC: 106 MMOL/L — SIGNIFICANT CHANGE UP (ref 98–110)
CO2 SERPL-SCNC: 27 MMOL/L — SIGNIFICANT CHANGE UP (ref 17–32)
CREAT SERPL-MCNC: 0.9 MG/DL — SIGNIFICANT CHANGE UP (ref 0.7–1.5)
CULTURE RESULTS: SIGNIFICANT CHANGE UP
GLUCOSE SERPL-MCNC: 106 MG/DL — HIGH (ref 70–99)
HCT VFR BLD CALC: 36.8 % — LOW (ref 37–47)
HGB BLD-MCNC: 12.1 G/DL — SIGNIFICANT CHANGE UP (ref 12–16)
MAGNESIUM SERPL-MCNC: 1.8 MG/DL — SIGNIFICANT CHANGE UP (ref 1.8–2.4)
MCHC RBC-ENTMCNC: 30.3 PG — SIGNIFICANT CHANGE UP (ref 27–31)
MCHC RBC-ENTMCNC: 32.9 G/DL — SIGNIFICANT CHANGE UP (ref 32–37)
MCV RBC AUTO: 92 FL — SIGNIFICANT CHANGE UP (ref 81–99)
NRBC # BLD: 0 /100 WBCS — SIGNIFICANT CHANGE UP (ref 0–0)
PLATELET # BLD AUTO: 171 K/UL — SIGNIFICANT CHANGE UP (ref 130–400)
POTASSIUM SERPL-MCNC: 3.9 MMOL/L — SIGNIFICANT CHANGE UP (ref 3.5–5)
POTASSIUM SERPL-SCNC: 3.9 MMOL/L — SIGNIFICANT CHANGE UP (ref 3.5–5)
PROT SERPL-MCNC: 5.5 G/DL — LOW (ref 6–8)
RBC # BLD: 4 M/UL — LOW (ref 4.2–5.4)
RBC # FLD: 13.6 % — SIGNIFICANT CHANGE UP (ref 11.5–14.5)
SARS-COV-2 IGG SERPL QL IA: NEGATIVE — SIGNIFICANT CHANGE UP
SARS-COV-2 IGM SERPL IA-ACNC: <0.1 INDEX — SIGNIFICANT CHANGE UP
SODIUM SERPL-SCNC: 141 MMOL/L — SIGNIFICANT CHANGE UP (ref 135–146)
SPECIMEN SOURCE: SIGNIFICANT CHANGE UP
WBC # BLD: 3.78 K/UL — LOW (ref 4.8–10.8)
WBC # FLD AUTO: 3.78 K/UL — LOW (ref 4.8–10.8)

## 2020-07-04 PROCEDURE — 99232 SBSQ HOSP IP/OBS MODERATE 35: CPT

## 2020-07-04 RX ORDER — DIPHENHYDRAMINE HCL 50 MG
25 CAPSULE ORAL EVERY 6 HOURS
Refills: 0 | Status: DISCONTINUED | OUTPATIENT
Start: 2020-07-04 | End: 2020-07-06

## 2020-07-04 RX ORDER — AZITHROMYCIN 500 MG/1
500 TABLET, FILM COATED ORAL DAILY
Refills: 0 | Status: COMPLETED | OUTPATIENT
Start: 2020-07-04 | End: 2020-07-06

## 2020-07-04 RX ORDER — CEFTRIAXONE 500 MG/1
1000 INJECTION, POWDER, FOR SOLUTION INTRAMUSCULAR; INTRAVENOUS EVERY 24 HOURS
Refills: 0 | Status: DISCONTINUED | OUTPATIENT
Start: 2020-07-04 | End: 2020-07-06

## 2020-07-04 RX ADMIN — AZITHROMYCIN 500 MILLIGRAM(S): 500 TABLET, FILM COATED ORAL at 13:06

## 2020-07-04 RX ADMIN — Medication 650 MILLIGRAM(S): at 23:47

## 2020-07-04 RX ADMIN — LEVETIRACETAM 1500 MILLIGRAM(S): 250 TABLET, FILM COATED ORAL at 05:23

## 2020-07-04 RX ADMIN — Medication 350 MICROGRAM(S): at 05:23

## 2020-07-04 RX ADMIN — CEFTRIAXONE 100 MILLIGRAM(S): 500 INJECTION, POWDER, FOR SOLUTION INTRAMUSCULAR; INTRAVENOUS at 09:40

## 2020-07-04 RX ADMIN — Medication 100 MILLIGRAM(S): at 21:27

## 2020-07-04 RX ADMIN — FAMOTIDINE 20 MILLIGRAM(S): 10 INJECTION INTRAVENOUS at 21:27

## 2020-07-04 RX ADMIN — OXYCODONE AND ACETAMINOPHEN 1 TABLET(S): 5; 325 TABLET ORAL at 22:38

## 2020-07-04 RX ADMIN — Medication 650 MILLIGRAM(S): at 08:33

## 2020-07-04 RX ADMIN — Medication 100 MILLIGRAM(S): at 13:07

## 2020-07-04 RX ADMIN — Medication 100 MILLIGRAM(S): at 05:24

## 2020-07-04 RX ADMIN — PANTOPRAZOLE SODIUM 40 MILLIGRAM(S): 20 TABLET, DELAYED RELEASE ORAL at 05:23

## 2020-07-04 NOTE — CHART NOTE - NSCHARTNOTEFT_GEN_A_CORE
called by nurse, pt c/o itchiness up the arm where the IV site is during the infusion of ciprofloxacin, apparently this is the 2nd time it occurred. pt is otherwise asymptomatic and hemodynamically stable. Ciprofloxacin discontinued, ceftriaxone added to ab regimen. Cipro added on her list of allergies. Benadryl 25mg Q6h PRN for itching.

## 2020-07-04 NOTE — CONSULT NOTE ADULT - ASSESSMENT
# Sepsis ( Fever + Tachycardia )  # Pancolitis- due to Campylobacter species  - C.diff negative     would recommend:    1. Follow up Blood cultures  2. Continue Azithromycin to cover Campylobacter  3. Monitor electrolytes and supplement as needed    will follow the patient with you and make further recommendation based on the clinical course and Lab results  Thank you for the opportunity to participate in Ms. QUAN's care      Attending Attestation:    Spent more than 65 minutes on total encounter, more than 50 % of the visit was spent counseling and/or coordinating care by the Attending physician. Patient is a 59y old  Female whose PMH includes Johnson's and lower GI "ulcer", AVM with craniotomy, cervical cancer and hypothyroidism  presents to the ER for evaluation of  intermittent fever,  lower abdominal cramping pain and diarrhea. On admission, she found to have fever, tachycardia  and Pancolitis on CT abd/pelvis. She has started On Cipro and flagyl initially and Later on switch to Azithromycin and Ceftriaxone after stool studies come back positive for Campylobacter species. Hence, the ID consult requested to assist with further antibiotic management.      # Sepsis ( Fever + Tachycardia )  # Pancolitis- due to Campylobacter species  - C.diff negative     would recommend:    1. Follow up Blood cultures  2. Continue Azithromycin to cover Campylobacter  X 3 days   3. Monitor electrolytes and supplement as needed in the setting of diarrhea  4. Colonoscopy as a outpatient     d/w patient     will follow the patient with you and make further recommendation based on the clinical course and Lab results  Thank you for the opportunity to participate in Ms. QUAN's care      Attending Attestation:    Spent more than 65 minutes on total encounter, more than 50 % of the visit was spent counseling and/or coordinating care by the Attending physician.

## 2020-07-04 NOTE — PROGRESS NOTE ADULT - ASSESSMENT
Patient is 60 yo female with hx of Brain aneurysm, Brain tumor, recurrent, Cervical cancer, Hypothyroid, Spine anomaly, Uterine cancer, sarcoma and Vertigo presenting with       1.  Acute pancolitis  -BC pending  -stool culture shows Campylobacter species.  Stool occult+.  H/H stable  -advance diet as tolerated  -IV antibiotic  -GI to follow up     2. splenic artery aneurysm  -Vascular recommended outpatient follow up        3. Hypothyroidism  -Continue with levothyroxine    4. hx of brain cancer  -Continue with keppra        #Progress Note Handoff  Pending (specify):  as above  Family discussion:  plan of care was discussed with patient in details.  all questions were answered.  seems to understand, and in agreement  Disposition:  unknown

## 2020-07-04 NOTE — CONSULT NOTE ADULT - SUBJECTIVE AND OBJECTIVE BOX
Patient is a 59y old  Female who presents with a chief complaint of abdm pain (2020 08:49)        REVIEW OF SYSTEMS: Total of twelve systems have been reviewed with patient and found to be negative unless mentioned in HPI        PAST MEDICAL & SURGICAL HISTORY:  Hypothyroid  Spine anomaly  Uterine cancer, sarcoma  Cervical cancer  Brain tumor, recurrent  Brain aneurysm  Vertigo  Cessation of menses: c section  H/O Spinal surgery: tumor removed plates in neck  H/O cerebral aneurysm repair  H/O abdominal hysterectomy        SOCIAL HISTORY  Alcohol: Does not drink  Tobacco: Does not smoke  Illicit substance use: None      FAMILY HISTORY: Non contributory to the present illness        ALLERGIES:  ciprofloxacin (Pruritus)      Vital Signs Last 24 Hrs  T(C): 36.1 (2020 05:36), Max: 36.1 (2020 05:36)  T(F): 97 (2020 05:36), Max: 97 (2020 05:36)  HR: 60 (2020 05:36) (60 - 64)  BP: 119/68 (2020 05:36) (105/61 - 122/76)  BP(mean): --  RR: 16 (2020 05:36) (16 - 16)  SpO2: --          PHYSICAL EXAM:  GENERAL: Not in distress   CHEST/LUNG:  Aire ntry bilaterally  HEART: s1 and s2 present  ABDOMEN:  Nontender and  Nondistended  EXTREMITIES: No pedal  edema  CNS: Awake and Alert      LABS:                        12.1   3.78  )-----------( 171      ( 2020 07:13 )             36.8         07-04    141  |  106  |  6<L>  ----------------------------<  106<H>  3.9   |  27  |  0.9    Ca    8.9      2020 07:13  Mg     1.8     07-04    TPro  5.5<L>  /  Alb  3.3<L>  /  TBili  0.3  /  DBili  x   /  AST  17  /  ALT  14  /  AlkPhos  76  07-04        Urinalysis Basic - ( 2020 18:08 )  Color: Yellow / Appearance: Clear / S.020 / pH: x  Gluc: x / Ketone: Negative  / Bili: Negative / Urobili: 0.2 mg/dL   Blood: x / Protein: Trace mg/dL / Nitrite: Negative   Leuk Esterase: Negative / RBC: 6-10 /HPF / WBC 1-2 /HPF   Sq Epi: x / Non Sq Epi: Moderate /HPF / Bacteria: Few        MEDICATIONS  (STANDING):  azithromycin   Tablet 500 milliGRAM(s) Oral daily  cefTRIAXone   IVPB 1000 milliGRAM(s) IV Intermittent every 24 hours  famotidine    Tablet 20 milliGRAM(s) Oral at bedtime  levETIRAcetam 1500 milliGRAM(s) Oral two times a day  levothyroxine 350 MICROGram(s) Oral <User Schedule>  metroNIDAZOLE  IVPB 500 milliGRAM(s) IV Intermittent every 8 hours  pantoprazole    Tablet 40 milliGRAM(s) Oral before breakfast    MEDICATIONS  (PRN):  acetaminophen   Tablet .. 650 milliGRAM(s) Oral every 6 hours PRN Mild Pain (1 - 3)  diphenhydrAMINE 25 milliGRAM(s) Oral every 6 hours PRN Rash and/or Itching  oxycodone    5 mG/acetaminophen 325 mG 1 Tablet(s) Oral every 6 hours PRN Severe Pain (7 - 10)        RADIOLOGY & ADDITIONAL TESTS:      < from: CT Abdomen and Pelvis w/ IV Cont (20 @ 19:40) >  1.  Pancolitis, infectious or inflammatoryin etiology.  2.  1.9 cm splenic artery aneurysm       MICROBIOLOGY DATA:      GI PCR Panel, Stool (20 @ 17:45)    Specimen Source: .Stool Feces    Culture Results:   Campylobacter species  DETECTED by PCR  *******Please Note:*******  GI panel PCR evaluates for:  Campylobacter, Plesiomonas shigelloides, Salmonella,  Vibrio, Yersinia enterocolitica, Enteroaggregative  Escherichia coli (EAEC), Enteropathogenic E.coli (EPEC),  Enterotoxigenic E. coli (ETEC) lt/st, Shiga-like  toxin-producing E. coli (STEC) stx1/stx2,  Shigella/ Enteroinvasive E. coli (EIEC), Cryptosporidium,  Cyclospora cayetanensis, Entamoeba histolytica,  Giardia lamblia, Adenovirus F 40/41, Astrovirus,  Norovirus GI/GII, Rotavirus A, Sapovirus      C Diff by PCR Result: Negative (20 @ 17:45)      COVID-19 PCR . (07.02.20 @ 21:17)    COVID-19 PCR: NotDetec Patient is a 59y old  Female whose PMH includes Johnson's and lower GI "ulcer", AVM with craniotomy, cervical cancer and hypothyroidism  presents to the ER for evaluation of  intermittent fever,  lower abdominal cramping pain and diarrhea. On admission, she found to have fever, tachycardia  and Pancolitis on CT abd/pelvis. She has started On Cipro and flagyl initially and Later on switch to Azithromycin and Ceftriaxone after stool studies come back positive for Campylobacter species. Hence, the ID consult requested to assist with further antibiotic management.        REVIEW OF SYSTEMS: Total of twelve systems have been reviewed with patient and found to be negative unless mentioned in HPI        PAST MEDICAL & SURGICAL HISTORY:  Hypothyroid  Spine anomaly  Uterine cancer, sarcoma  Cervical cancer  Brain tumor, recurrent  Brain aneurysm  Vertigo  Cessation of menses: c section  H/O Spinal surgery: tumor removed plates in neck  H/O cerebral aneurysm repair  H/O abdominal hysterectomy        SOCIAL HISTORY  Alcohol: Does not drink  Tobacco: Does not smoke  Illicit substance use: None        FAMILY HISTORY: Non contributory to the present illness        ALLERGIES:  ciprofloxacin (Pruritus)      Vital Signs Last 24 Hrs  T(C): 36.1 (2020 05:36), Max: 36.1 (2020 05:36)  T(F): 97 (2020 05:36), Max: 97 (2020 05:36)  HR: 60 (2020 05:36) (60 - 64)  BP: 119/68 (2020 05:36) (105/61 - 122/76)  BP(mean): --  RR: 16 (2020 05:36) (16 - 16)  SpO2: --          PHYSICAL EXAM:  GENERAL: Not in distress   CHEST/LUNG: Not using  accessory muscles   HEART: s1 and s2 present  ABDOMEN:  Mild tenderness   EXTREMITIES: No pedal  edema  CNS: Awake and Alert        LABS:                        12.1   3.78  )-----------( 171      ( 2020 07:13 )             36.8         07-04    141  |  106  |  6<L>  ----------------------------<  106<H>  3.9   |  27  |  0.9    Ca    8.9      2020 07:13  Mg     1.8     07-04    TPro  5.5<L>  /  Alb  3.3<L>  /  TBili  0.3  /  DBili  x   /  AST  17  /  ALT  14  /  AlkPhos  76  07-04        Urinalysis Basic - ( 2020 18:08 )  Color: Yellow / Appearance: Clear / S.020 / pH: x  Gluc: x / Ketone: Negative  / Bili: Negative / Urobili: 0.2 mg/dL   Blood: x / Protein: Trace mg/dL / Nitrite: Negative   Leuk Esterase: Negative / RBC: 6-10 /HPF / WBC 1-2 /HPF   Sq Epi: x / Non Sq Epi: Moderate /HPF / Bacteria: Few        MEDICATIONS  (STANDING):  azithromycin   Tablet 500 milliGRAM(s) Oral daily  cefTRIAXone   IVPB 1000 milliGRAM(s) IV Intermittent every 24 hours  famotidine    Tablet 20 milliGRAM(s) Oral at bedtime  levETIRAcetam 1500 milliGRAM(s) Oral two times a day  levothyroxine 350 MICROGram(s) Oral <User Schedule>  metroNIDAZOLE  IVPB 500 milliGRAM(s) IV Intermittent every 8 hours  pantoprazole    Tablet 40 milliGRAM(s) Oral before breakfast    MEDICATIONS  (PRN):  acetaminophen   Tablet .. 650 milliGRAM(s) Oral every 6 hours PRN Mild Pain (1 - 3)  diphenhydrAMINE 25 milliGRAM(s) Oral every 6 hours PRN Rash and/or Itching  oxycodone    5 mG/acetaminophen 325 mG 1 Tablet(s) Oral every 6 hours PRN Severe Pain (7 - 10)        RADIOLOGY & ADDITIONAL TESTS:      < from: CT Abdomen and Pelvis w/ IV Cont (20 @ 19:40) >  1.  Pancolitis, infectious or inflammatoryin etiology.  2.  1.9 cm splenic artery aneurysm       MICROBIOLOGY DATA:      GI PCR Panel, Stool (20 @ 17:45)    Specimen Source: .Stool Feces    Culture Results:   Campylobacter species  DETECTED by PCR  *******Please Note:*******  GI panel PCR evaluates for:  Campylobacter, Plesiomonas shigelloides, Salmonella,  Vibrio, Yersinia enterocolitica, Enteroaggregative  Escherichia coli (EAEC), Enteropathogenic E.coli (EPEC),  Enterotoxigenic E. coli (ETEC) lt/st, Shiga-like  toxin-producing E. coli (STEC) stx1/stx2,  Shigella/ Enteroinvasive E. coli (EIEC), Cryptosporidium,  Cyclospora cayetanensis, Entamoeba histolytica,  Giardia lamblia, Adenovirus F 40/41, Astrovirus,  Norovirus GI/GII, Rotavirus A, Sapovirus      C Diff by PCR Result: Negative (20 @ 17:45)      COVID-19 PCR . (20 @ 21:17)    COVID-19 PCR: NotDetec

## 2020-07-04 NOTE — PROGRESS NOTE ADULT - SUBJECTIVE AND OBJECTIVE BOX
CC.  Abdm pain  HPI.  Patient reports abdm pain resolving.  no BM.  afebrile  wants to eat regular food  offers no other complaints              Constitutional: No fatigue or weight loss.  Skin: No rash.  Eyes: No recent vision problems or eye pain.  ENT: No congestion, ear pain, or sore throat.  Endocrine: No thyroid problems.  Cardiovascular: No chest pain or palpation.  Respiratory: No cough, shortness of breath, congestion, or wheezing.  Gastrointestinal: No  nausea, vomiting,   Genitourinary: No dysuria.  Musculoskeletal: No joint swelling.  Neurologic: No headache.      Vital Signs Last 24 Hrs  T(C): 36.1 (2020 05:36), Max: 36.1 (2020 05:36)  T(F): 97 (2020 05:36), Max: 97 (2020 05:36)  HR: 60 (2020 05:36) (60 - 64)  BP: 119/68 (2020 05:36) (105/61 - 122/76)  BP(mean): --  RR: 16 (2020 05:36) (16 - 16)  SpO2: --        PHYSICAL EXAM-  GENERAL: NAD, chronic ill appearing female  HEAD:  Atraumatic, Normocephalic  EYES: EOMI, PERRLA, conjunctiva and sclera clear  NECK: Supple, No JVD, Normal thyroid  NERVOUS SYSTEM:  Alert & Oriented X3, Moving all extremities  CHEST/LUNG: Clear to percussion bilaterally; No rales, rhonchi, wheezing, or rubs  HEART: Regular rate and rhythm; No murmurs, rubs, or gallops  ABDOMEN: Soft, Nontender, Nondistended; hyperactive BS  EXTREMITIES:   No clubbing, cyanosis, or edema  SKIN: No rashes or lesions                              12.1   3.78  )-----------( 171      ( 2020 07:13 )             36.8     07-04    141  |  106  |  6<L>  ----------------------------<  106<H>  3.9   |  27  |  0.9    Ca    8.9      2020 07:13  Mg     1.8     07-04    TPro  5.5<L>  /  Alb  3.3<L>  /  TBili  0.3  /  DBili  x   /  AST  17  /  ALT  14  /  AlkPhos  76  07-04          Urinalysis Basic - ( 2020 18:08 )    Color: Yellow / Appearance: Clear / S.020 / pH: x  Gluc: x / Ketone: Negative  / Bili: Negative / Urobili: 0.2 mg/dL   Blood: x / Protein: Trace mg/dL / Nitrite: Negative   Leuk Esterase: Negative / RBC: 6-10 /HPF / WBC 1-2 /HPF   Sq Epi: x / Non Sq Epi: Moderate /HPF / Bacteria: Few      MEDICATIONS  (STANDING):  cefTRIAXone   IVPB 1000 milliGRAM(s) IV Intermittent every 24 hours  famotidine    Tablet 20 milliGRAM(s) Oral at bedtime  levETIRAcetam 1500 milliGRAM(s) Oral two times a day  levothyroxine 350 MICROGram(s) Oral <User Schedule>  metroNIDAZOLE  IVPB 500 milliGRAM(s) IV Intermittent every 8 hours  pantoprazole    Tablet 40 milliGRAM(s) Oral before breakfast    MEDICATIONS  (PRN):  acetaminophen   Tablet .. 650 milliGRAM(s) Oral every 6 hours PRN Mild Pain (1 - 3)  diphenhydrAMINE 25 milliGRAM(s) Oral every 6 hours PRN Rash and/or Itching  oxycodone    5 mG/acetaminophen 325 mG 1 Tablet(s) Oral every 6 hours PRN Severe Pain (7 - 10)                [x ] YES  [ ] NO    Consultant(s) Notes Reviewed:  [x ] YES  [ ] NO    Care Discussed with Consultants/Other Providers [x ] YES  [ ] No medical contraindication for discharge

## 2020-07-05 LAB
ANION GAP SERPL CALC-SCNC: 10 MMOL/L — SIGNIFICANT CHANGE UP (ref 7–14)
BUN SERPL-MCNC: 9 MG/DL — LOW (ref 10–20)
CALCIUM SERPL-MCNC: 8.6 MG/DL — SIGNIFICANT CHANGE UP (ref 8.5–10.1)
CHLORIDE SERPL-SCNC: 104 MMOL/L — SIGNIFICANT CHANGE UP (ref 98–110)
CO2 SERPL-SCNC: 27 MMOL/L — SIGNIFICANT CHANGE UP (ref 17–32)
CREAT SERPL-MCNC: 0.9 MG/DL — SIGNIFICANT CHANGE UP (ref 0.7–1.5)
CULTURE RESULTS: SIGNIFICANT CHANGE UP
GLUCOSE SERPL-MCNC: 94 MG/DL — SIGNIFICANT CHANGE UP (ref 70–99)
HCT VFR BLD CALC: 35.1 % — LOW (ref 37–47)
HGB BLD-MCNC: 11.8 G/DL — LOW (ref 12–16)
MCHC RBC-ENTMCNC: 30.3 PG — SIGNIFICANT CHANGE UP (ref 27–31)
MCHC RBC-ENTMCNC: 33.6 G/DL — SIGNIFICANT CHANGE UP (ref 32–37)
MCV RBC AUTO: 90.2 FL — SIGNIFICANT CHANGE UP (ref 81–99)
NRBC # BLD: 0 /100 WBCS — SIGNIFICANT CHANGE UP (ref 0–0)
PLATELET # BLD AUTO: 194 K/UL — SIGNIFICANT CHANGE UP (ref 130–400)
POTASSIUM SERPL-MCNC: 3.6 MMOL/L — SIGNIFICANT CHANGE UP (ref 3.5–5)
POTASSIUM SERPL-SCNC: 3.6 MMOL/L — SIGNIFICANT CHANGE UP (ref 3.5–5)
RBC # BLD: 3.89 M/UL — LOW (ref 4.2–5.4)
RBC # FLD: 13.5 % — SIGNIFICANT CHANGE UP (ref 11.5–14.5)
SODIUM SERPL-SCNC: 141 MMOL/L — SIGNIFICANT CHANGE UP (ref 135–146)
SPECIMEN SOURCE: SIGNIFICANT CHANGE UP
WBC # BLD: 3.78 K/UL — LOW (ref 4.8–10.8)
WBC # FLD AUTO: 3.78 K/UL — LOW (ref 4.8–10.8)

## 2020-07-05 PROCEDURE — 99233 SBSQ HOSP IP/OBS HIGH 50: CPT

## 2020-07-05 RX ADMIN — LEVETIRACETAM 1500 MILLIGRAM(S): 250 TABLET, FILM COATED ORAL at 05:54

## 2020-07-05 RX ADMIN — Medication 100 MILLIGRAM(S): at 13:56

## 2020-07-05 RX ADMIN — OXYCODONE AND ACETAMINOPHEN 1 TABLET(S): 5; 325 TABLET ORAL at 23:01

## 2020-07-05 RX ADMIN — AZITHROMYCIN 500 MILLIGRAM(S): 500 TABLET, FILM COATED ORAL at 11:24

## 2020-07-05 RX ADMIN — FAMOTIDINE 20 MILLIGRAM(S): 10 INJECTION INTRAVENOUS at 21:53

## 2020-07-05 RX ADMIN — CEFTRIAXONE 100 MILLIGRAM(S): 500 INJECTION, POWDER, FOR SOLUTION INTRAMUSCULAR; INTRAVENOUS at 09:11

## 2020-07-05 RX ADMIN — Medication 100 MILLIGRAM(S): at 05:54

## 2020-07-05 RX ADMIN — Medication 100 MILLIGRAM(S): at 21:53

## 2020-07-05 RX ADMIN — Medication 350 MICROGRAM(S): at 05:54

## 2020-07-05 RX ADMIN — LEVETIRACETAM 1500 MILLIGRAM(S): 250 TABLET, FILM COATED ORAL at 18:53

## 2020-07-05 RX ADMIN — PANTOPRAZOLE SODIUM 40 MILLIGRAM(S): 20 TABLET, DELAYED RELEASE ORAL at 07:46

## 2020-07-05 RX ADMIN — OXYCODONE AND ACETAMINOPHEN 1 TABLET(S): 5; 325 TABLET ORAL at 15:10

## 2020-07-05 RX ADMIN — OXYCODONE AND ACETAMINOPHEN 1 TABLET(S): 5; 325 TABLET ORAL at 06:06

## 2020-07-05 NOTE — PROGRESS NOTE ADULT - SUBJECTIVE AND OBJECTIVE BOX
REVIEW OF SYSTEMS: Total of twelve systems have been reviewed with patient and found to be negative unless mentioned in HPI        ALLERGIES:  ciprofloxacin (Pruritus)      Vital Signs Last 24 Hrs  T(C): 36.2 (2020 14:10), Max: 36.5 (2020 21:27)  T(F): 97.1 (2020 14:10), Max: 97.7 (2020 21:27)  HR: 62 (2020 14:10) (52 - 64)  BP: 138/71 (2020 14:10) (125/59 - 171/81)  BP(mean): --  RR: 16 (2020 14:57) (16 - 16)  SpO2: --        PHYSICAL EXAM:  GENERAL: Not in distress   CHEST/LUNG: Not using  accessory muscles   HEART: s1 and s2 present  ABDOMEN:  Mild tenderness   EXTREMITIES: No pedal  edema  CNS: Awake and Alert        LABS:                        11.8   3.78  )-----------( 194      ( 2020 06:59 )             35.1                           12.1   3.78  )-----------( 171      ( 2020 07:13 )             36.8       07-05    141  |  104  |  9<L>  ----------------------------<  94  3.6   |  27  |  0.9    Ca    8.6      2020 06:59  Mg     1.8     07-04    TPro  5.5<L>  /  Alb  3.3<L>  /  TBili  0.3  /  DBili  x   /  AST  17  /  ALT  14  /  AlkPhos  76  07-04    07-04    141  |  106  |  6<L>  ----------------------------<  106<H>  3.9   |  27  |  0.9    Ca    8.9      2020 07:13  Mg     1.8     07-04    TPro  5.5<L>  /  Alb  3.3<L>  /  TBili  0.3  /  DBili  x   /  AST  17  /  ALT  14  /  AlkPhos  76  07-04        Urinalysis Basic - ( 2020 18:08 )  Color: Yellow / Appearance: Clear / S.020 / pH: x  Gluc: x / Ketone: Negative  / Bili: Negative / Urobili: 0.2 mg/dL   Blood: x / Protein: Trace mg/dL / Nitrite: Negative   Leuk Esterase: Negative / RBC: 6-10 /HPF / WBC 1-2 /HPF   Sq Epi: x / Non Sq Epi: Moderate /HPF / Bacteria: Few        MEDICATIONS  (STANDING):    azithromycin   Tablet 500 milliGRAM(s) Oral daily  cefTRIAXone   IVPB 1000 milliGRAM(s) IV Intermittent every 24 hours  famotidine    Tablet 20 milliGRAM(s) Oral at bedtime  levETIRAcetam 1500 milliGRAM(s) Oral two times a day  levothyroxine 350 MICROGram(s) Oral <User Schedule>  metroNIDAZOLE  IVPB 500 milliGRAM(s) IV Intermittent every 8 hours  pantoprazole    Tablet 40 milliGRAM(s) Oral before breakfast      RADIOLOGY & ADDITIONAL TESTS:      < from: CT Abdomen and Pelvis w/ IV Cont (20 @ 19:40) >  1.  Pancolitis, infectious or inflammatoryin etiology.  2.  1.9 cm splenic artery aneurysm       MICROBIOLOGY DATA:      GI PCR Panel, Stool (20 @ 17:45)    Specimen Source: .Stool Feces    Culture Results:   Campylobacter species  DETECTED by PCR  *******Please Note:*******  GI panel PCR evaluates for:  Campylobacter, Plesiomonas shigelloides, Salmonella,  Vibrio, Yersinia enterocolitica, Enteroaggregative  Escherichia coli (EAEC), Enteropathogenic E.coli (EPEC),  Enterotoxigenic E. coli (ETEC) lt/st, Shiga-like  toxin-producing E. coli (STEC) stx1/stx2,  Shigella/ Enteroinvasive E. coli (EIEC), Cryptosporidium,  Cyclospora cayetanensis, Entamoeba histolytica,  Giardia lamblia, Adenovirus F 40/41, Astrovirus,  Norovirus GI/GII, Rotavirus A, Sapovirus      C Diff by PCR Result: Negative (20 @ 17:45)      COVID-19 PCR . (20 @ 21:17)    COVID-19 PCR: NotDetec          Assessment and Recommendation:   · Assessment		  Patient is a 59y old  Female whose PMH includes Johnson's and lower GI "ulcer", AVM with craniotomy, cervical cancer and hypothyroidism  presents to the ER for evaluation of  intermittent fever,  lower abdominal cramping pain and diarrhea. On admission, she found to have fever, tachycardia  and Pancolitis on CT abd/pelvis. She has started On Cipro and flagyl initially and Later on switch to Azithromycin and Ceftriaxone after stool studies come back positive for Campylobacter species. Hence, the ID consult requested to assist with further antibiotic management.      # Sepsis ( Fever + Tachycardia )  # Pancolitis- due to Campylobacter species  - C.diff negative     would recommend:    1. Follow up Blood cultures  2. Continue Azithromycin to cover Campylobacter  X 3 days   3. Monitor electrolytes and supplement as needed in the setting of diarrhea  4. Colonoscopy as a outpatient     d/w patient       Attending Attestation:    Spent more than 45 minutes on total encounter, more than 50 % of the visit was spent counseling and/or coordinating care by the Attending physician. Patient is seen and examined at the bed side, is afebrile.        REVIEW OF SYSTEMS:  All other review systems are negative         ALLERGIES:  ciprofloxacin (Pruritus)      Vital Signs Last 24 Hrs  T(C): 36.2 (2020 14:10), Max: 36.5 (2020 21:27)  T(F): 97.1 (2020 14:10), Max: 97.7 (2020 21:27)  HR: 62 (2020 14:10) (52 - 64)  BP: 138/71 (2020 14:10) (125/59 - 171/81)  BP(mean): --  RR: 16 (2020 14:57) (16 - 16)  SpO2: --        PHYSICAL EXAM:  GENERAL: Not in distress   CHEST/LUNG: Not using  accessory muscles   HEART: s1 and s2 present  ABDOMEN:  Mild tenderness   EXTREMITIES: No pedal  edema  CNS: Awake and Alert        LABS:                        11.8   3.78  )-----------( 194      ( 2020 06:59 )             35.1                           12.1   3.78  )-----------( 171      ( 2020 07:13 )             36.8           07-05    141  |  104  |  9<L>  ----------------------------<  94  3.6   |  27  |  0.9    Ca    8.6      2020 06:59  Mg     1.8     07-04    TPro  5.5<L>  /  Alb  3.3<L>  /  TBili  0.3  /  DBili  x   /  AST  17  /  ALT  14  /  AlkPhos  76  07-04    07-04    141  |  106  |  6<L>  ----------------------------<  106<H>  3.9   |  27  |  0.9    Ca    8.9      2020 07:13  Mg     1.8     07-04    TPro  5.5<L>  /  Alb  3.3<L>  /  TBili  0.3  /  DBili  x   /  AST  17  /  ALT  14  /  AlkPhos  76  07-04        Urinalysis Basic - ( 2020 18:08 )  Color: Yellow / Appearance: Clear / S.020 / pH: x  Gluc: x / Ketone: Negative  / Bili: Negative / Urobili: 0.2 mg/dL   Blood: x / Protein: Trace mg/dL / Nitrite: Negative   Leuk Esterase: Negative / RBC: 6-10 /HPF / WBC 1-2 /HPF   Sq Epi: x / Non Sq Epi: Moderate /HPF / Bacteria: Few        MEDICATIONS  (STANDING):    azithromycin   Tablet 500 milliGRAM(s) Oral daily  cefTRIAXone   IVPB 1000 milliGRAM(s) IV Intermittent every 24 hours  famotidine    Tablet 20 milliGRAM(s) Oral at bedtime  levETIRAcetam 1500 milliGRAM(s) Oral two times a day  levothyroxine 350 MICROGram(s) Oral <User Schedule>  metroNIDAZOLE  IVPB 500 milliGRAM(s) IV Intermittent every 8 hours  pantoprazole    Tablet 40 milliGRAM(s) Oral before breakfast      RADIOLOGY & ADDITIONAL TESTS:      < from: CT Abdomen and Pelvis w/ IV Cont (20 @ 19:40) >  1.  Pancolitis, infectious or inflammatoryin etiology.  2.  1.9 cm splenic artery aneurysm       MICROBIOLOGY DATA:      GI PCR Panel, Stool (20 @ 17:45)    Specimen Source: .Stool Feces    Culture Results:   Campylobacter species  DETECTED by PCR  *******Please Note:*******  GI panel PCR evaluates for:  Campylobacter, Plesiomonas shigelloides, Salmonella,  Vibrio, Yersinia enterocolitica, Enteroaggregative  Escherichia coli (EAEC), Enteropathogenic E.coli (EPEC),  Enterotoxigenic E. coli (ETEC) lt/st, Shiga-like  toxin-producing E. coli (STEC) stx1/stx2,  Shigella/ Enteroinvasive E. coli (EIEC), Cryptosporidium,  Cyclospora cayetanensis, Entamoeba histolytica,  Giardia lamblia, Adenovirus F 40/41, Astrovirus,  Norovirus GI/GII, Rotavirus A, Sapovirus      C Diff by PCR Result: Negative (20 @ 17:45)      COVID-19 PCR . (20 @ 21:17)    COVID-19 PCR: NotDetec

## 2020-07-05 NOTE — PROGRESS NOTE ADULT - SUBJECTIVE AND OBJECTIVE BOX
CC.  Abdm pain  HPI.  Patient reports abdm pain resolving.  Loose stool.  afebrile  Tolerating regular diet well  offers no other complaints              Constitutional: No fatigue or weight loss.  Skin: No rash.  Eyes: No recent vision problems or eye pain.  ENT: No congestion, ear pain, or sore throat.  Endocrine: No thyroid problems.  Cardiovascular: No chest pain or palpation.  Respiratory: No cough, shortness of breath, congestion, or wheezing.  Gastrointestinal: No  nausea, vomiting,   Genitourinary: No dysuria.  Musculoskeletal: No joint swelling.  Neurologic: No headache.      Vital Signs Last 24 Hrs  T(C): 35.7 (05 Jul 2020 05:00), Max: 36.5 (04 Jul 2020 21:27)  T(F): 96.3 (05 Jul 2020 05:00), Max: 97.7 (04 Jul 2020 21:27)  HR: 52 (05 Jul 2020 05:00) (52 - 64)  BP: 125/59 (05 Jul 2020 05:00) (125/59 - 171/81)  BP(mean): --  RR: 16 (05 Jul 2020 05:00) (16 - 16)  SpO2: --        PHYSICAL EXAM-  GENERAL: NAD, chronic ill appearing female  HEAD:  Atraumatic, Normocephalic  EYES: EOMI, PERRLA, conjunctiva and sclera clear  NECK: Supple, No JVD, Normal thyroid  NERVOUS SYSTEM:  Alert & Oriented X3, Moving all extremities  CHEST/LUNG: Clear to percussion bilaterally; No rales, rhonchi, wheezing, or rubs  HEART: Regular rate and rhythm; No murmurs, rubs, or gallops  ABDOMEN: Soft, Nontender, Nondistended; hyperactive BS  EXTREMITIES:   No clubbing, cyanosis, or edema  SKIN: No rashes or lesions        MEDICATIONS  (STANDING):  azithromycin   Tablet 500 milliGRAM(s) Oral daily  cefTRIAXone   IVPB 1000 milliGRAM(s) IV Intermittent every 24 hours  famotidine    Tablet 20 milliGRAM(s) Oral at bedtime  levETIRAcetam 1500 milliGRAM(s) Oral two times a day  levothyroxine 350 MICROGram(s) Oral <User Schedule>  metroNIDAZOLE  IVPB 500 milliGRAM(s) IV Intermittent every 8 hours  pantoprazole    Tablet 40 milliGRAM(s) Oral before breakfast    MEDICATIONS  (PRN):  acetaminophen   Tablet .. 650 milliGRAM(s) Oral every 6 hours PRN Mild Pain (1 - 3)  diphenhydrAMINE 25 milliGRAM(s) Oral every 6 hours PRN Rash and/or Itching  oxycodone    5 mG/acetaminophen 325 mG 1 Tablet(s) Oral every 6 hours PRN Severe Pain (7 - 10)                              11.8   3.78  )-----------( 194      ( 05 Jul 2020 06:59 )             35.1     07-05    141  |  104  |  9<L>  ----------------------------<  94  3.6   |  27  |  0.9    Ca    8.6      05 Jul 2020 06:59  Mg     1.8     07-04    TPro  5.5<L>  /  Alb  3.3<L>  /  TBili  0.3  /  DBili  x   /  AST  17  /  ALT  14  /  AlkPhos  76  07-04                     [x ] YES  [ ] NO    Consultant(s) Notes Reviewed:  [x ] YES  [ ] NO    Care Discussed with Consultants/Other Providers [x ] YES  [ ] No medical contraindication for discharge

## 2020-07-05 NOTE — PROGRESS NOTE ADULT - ASSESSMENT
Patient is a 59y old  Female whose PMH includes Johnson's and lower GI "ulcer", AVM with craniotomy, cervical cancer and hypothyroidism  presents to the ER for evaluation of  intermittent fever,  lower abdominal cramping pain and diarrhea. On admission, she found to have fever, tachycardia  and Pancolitis on CT abd/pelvis. She has started On Cipro and flagyl initially and Later on switch to Azithromycin and Ceftriaxone after stool studies come back positive for Campylobacter species. Hence, the ID consult requested to assist with further antibiotic management.      # Sepsis ( Fever + Tachycardia )  # Pancolitis- due to Campylobacter species  - C.diff negative     would recommend:    1. Follow up Blood cultures  2. Continue Azithromycin to cover Campylobacter  X 3 days   3. Change Ceftriaxone  to Cipro if diarrhea does not resolved and c/w flagyl   4. Colonoscopy as a outpatient     d/w patient       Attending Attestation:    Spent more than 45 minutes on total encounter, more than 50 % of the visit was spent counseling and/or coordinating care by the Attending physician.

## 2020-07-05 NOTE — PROGRESS NOTE ADULT - ASSESSMENT
Patient is 60 yo female with hx of Brain aneurysm, Brain tumor, recurrent, Cervical cancer, Hypothyroid, Spine anomaly, Uterine cancer, sarcoma and Vertigo presenting with       1.  Acute pancolitis  -BC negative  -stool culture shows Campylobacter species.  Stool occult+.  H/H stable  -tolerating diet well.  Loose stool  -IV antibiotic  -GI to follow up     2. splenic artery aneurysm  -Vascular recommended outpatient follow up        3. Hypothyroidism  -Continue with levothyroxine    4. hx of brain cancer  -Continue with keppra        #Progress Note Handoff  Pending (specify):  as above  Family discussion:  plan of care was discussed with patient in details.  all questions were answered.  seems to understand, and in agreement  Disposition:  unknown

## 2020-07-06 ENCOUNTER — TRANSCRIPTION ENCOUNTER (OUTPATIENT)
Age: 60
End: 2020-07-06

## 2020-07-06 VITALS — HEART RATE: 62 BPM | SYSTOLIC BLOOD PRESSURE: 177 MMHG | DIASTOLIC BLOOD PRESSURE: 79 MMHG | TEMPERATURE: 97 F

## 2020-07-06 LAB
ALBUMIN SERPL ELPH-MCNC: 3.7 G/DL — SIGNIFICANT CHANGE UP (ref 3.5–5.2)
ALP SERPL-CCNC: 89 U/L — SIGNIFICANT CHANGE UP (ref 30–115)
ALT FLD-CCNC: 17 U/L — SIGNIFICANT CHANGE UP (ref 0–41)
ANION GAP SERPL CALC-SCNC: 9 MMOL/L — SIGNIFICANT CHANGE UP (ref 7–14)
AST SERPL-CCNC: 19 U/L — SIGNIFICANT CHANGE UP (ref 0–41)
BILIRUB SERPL-MCNC: 0.2 MG/DL — SIGNIFICANT CHANGE UP (ref 0.2–1.2)
BUN SERPL-MCNC: 12 MG/DL — SIGNIFICANT CHANGE UP (ref 10–20)
CALCIUM SERPL-MCNC: 8.7 MG/DL — SIGNIFICANT CHANGE UP (ref 8.5–10.1)
CHLORIDE SERPL-SCNC: 103 MMOL/L — SIGNIFICANT CHANGE UP (ref 98–110)
CO2 SERPL-SCNC: 29 MMOL/L — SIGNIFICANT CHANGE UP (ref 17–32)
CREAT SERPL-MCNC: 0.8 MG/DL — SIGNIFICANT CHANGE UP (ref 0.7–1.5)
GLUCOSE SERPL-MCNC: 92 MG/DL — SIGNIFICANT CHANGE UP (ref 70–99)
HCT VFR BLD CALC: 35.5 % — LOW (ref 37–47)
HGB BLD-MCNC: 12 G/DL — SIGNIFICANT CHANGE UP (ref 12–16)
MAGNESIUM SERPL-MCNC: 1.7 MG/DL — LOW (ref 1.8–2.4)
MCHC RBC-ENTMCNC: 30.2 PG — SIGNIFICANT CHANGE UP (ref 27–31)
MCHC RBC-ENTMCNC: 33.8 G/DL — SIGNIFICANT CHANGE UP (ref 32–37)
MCV RBC AUTO: 89.2 FL — SIGNIFICANT CHANGE UP (ref 81–99)
NRBC # BLD: 0 /100 WBCS — SIGNIFICANT CHANGE UP (ref 0–0)
PLATELET # BLD AUTO: 237 K/UL — SIGNIFICANT CHANGE UP (ref 130–400)
POTASSIUM SERPL-MCNC: 3.7 MMOL/L — SIGNIFICANT CHANGE UP (ref 3.5–5)
POTASSIUM SERPL-SCNC: 3.7 MMOL/L — SIGNIFICANT CHANGE UP (ref 3.5–5)
PROT SERPL-MCNC: 5.7 G/DL — LOW (ref 6–8)
RBC # BLD: 3.98 M/UL — LOW (ref 4.2–5.4)
RBC # FLD: 13.2 % — SIGNIFICANT CHANGE UP (ref 11.5–14.5)
SODIUM SERPL-SCNC: 141 MMOL/L — SIGNIFICANT CHANGE UP (ref 135–146)
WBC # BLD: 3.87 K/UL — LOW (ref 4.8–10.8)
WBC # FLD AUTO: 3.87 K/UL — LOW (ref 4.8–10.8)

## 2020-07-06 PROCEDURE — 99232 SBSQ HOSP IP/OBS MODERATE 35: CPT

## 2020-07-06 PROCEDURE — 99239 HOSP IP/OBS DSCHRG MGMT >30: CPT

## 2020-07-06 RX ORDER — ACETAMINOPHEN 500 MG
2 TABLET ORAL
Qty: 0 | Refills: 0 | DISCHARGE
Start: 2020-07-06

## 2020-07-06 RX ORDER — CEFUROXIME AXETIL 250 MG
1 TABLET ORAL
Qty: 14 | Refills: 0
Start: 2020-07-06 | End: 2020-07-12

## 2020-07-06 RX ORDER — METRONIDAZOLE 500 MG
1 TABLET ORAL
Qty: 21 | Refills: 0
Start: 2020-07-06 | End: 2020-07-12

## 2020-07-06 RX ORDER — AZITHROMYCIN 500 MG/1
1 TABLET, FILM COATED ORAL
Qty: 3 | Refills: 0
Start: 2020-07-06 | End: 2020-07-08

## 2020-07-06 RX ADMIN — CEFTRIAXONE 100 MILLIGRAM(S): 500 INJECTION, POWDER, FOR SOLUTION INTRAMUSCULAR; INTRAVENOUS at 08:53

## 2020-07-06 RX ADMIN — LEVETIRACETAM 1500 MILLIGRAM(S): 250 TABLET, FILM COATED ORAL at 05:35

## 2020-07-06 RX ADMIN — PANTOPRAZOLE SODIUM 40 MILLIGRAM(S): 20 TABLET, DELAYED RELEASE ORAL at 08:54

## 2020-07-06 RX ADMIN — Medication 100 MILLIGRAM(S): at 05:34

## 2020-07-06 RX ADMIN — AZITHROMYCIN 500 MILLIGRAM(S): 500 TABLET, FILM COATED ORAL at 12:45

## 2020-07-06 RX ADMIN — Medication 350 MICROGRAM(S): at 05:35

## 2020-07-06 NOTE — DISCHARGE NOTE PROVIDER - NSDCFUADDAPPT_GEN_ALL_CORE_FT
Please follow up with your doctor in one week  Please follow up with gastroenterology in 1-2 weeks  Please come back to the hospital if you develop any new symptoms or concerns Please follow up with your doctor in one week  Please follow up with gastroenterology in 1-2 weeks  Please follow up with vascular in one wee  Please come back to the hospital if you develop any new symptoms or concerns

## 2020-07-06 NOTE — DISCHARGE NOTE PROVIDER - CARE PROVIDERS DIRECT ADDRESSES
,[email or web address]

## 2020-07-06 NOTE — PROGRESS NOTE ADULT - SUBJECTIVE AND OBJECTIVE BOX
59yFemale  Being followed for   Interval history:      PAST MEDICAL & SURGICAL HISTORY:        Social History: No smoking. No alcohol. No illegal drug use.          MEDICATIONS:  MEDICATIONS  (STANDING):  cefTRIAXone   IVPB 1000 milliGRAM(s) IV Intermittent every 24 hours  famotidine    Tablet 20 milliGRAM(s) Oral at bedtime  levETIRAcetam 1500 milliGRAM(s) Oral two times a day  levothyroxine 350 MICROGram(s) Oral <User Schedule>  metroNIDAZOLE  IVPB 500 milliGRAM(s) IV Intermittent every 8 hours  pantoprazole    Tablet 40 milliGRAM(s) Oral before breakfast    MEDICATIONS  (PRN):  acetaminophen   Tablet .. 650 milliGRAM(s) Oral every 6 hours PRN Mild Pain (1 - 3)  diphenhydrAMINE 25 milliGRAM(s) Oral every 6 hours PRN Rash and/or Itching  oxycodone    5 mG/acetaminophen 325 mG 1 Tablet(s) Oral every 6 hours PRN Severe Pain (7 - 10)      Allergies:      REVIEW OF SYSTEMS:  General:  No weight loss, fevers, or chills.  Eyes:  No reported pain or visual changes  ENT:  No sore throat or runny nose.  NECK: No stiffness   CV:  No chest pain or palpitations.  Resp:  No shortness of breath, cough  GI:  No abdominal pain, nausea, vomiting, dysphagia, diarrhea or constipation. No rectal bleeding, melena, or hematemesis.  Muscle:  No aches or weakness  Neuro:  No tingling, numbness   Heme:  No ecchymosis or easy bruisability        VITAL SIGNS:   T(F): 97 (07-06-20 @ 14:27), Max: 97.3 (07-06-20 @ 05:15)  HR: 62 (07-06-20 @ 14:27) (60 - 62)  BP: 177/79 (07-06-20 @ 14:27) (125/76 - 177/79)  RR: 16 (07-06-20 @ 05:15) (16 - 16)  SpO2: --    PHYSICAL EXAM:  GENERAL: AAOx3, no acute distress.  HEAD:  Atraumatic, Normocephalic  EYES: conjunctiva and sclera clear  NECK: Supple, no JVD or thyromegaly  CHEST/LUNG: Clear to auscultation bilaterally; No wheeze, rhonchi, or rales  HEART: Regular rate and rhythm; normal S1, S2, No murmurs.  ABDOMEN: Soft, nontender, nondistended; Bowel sounds present, no abdominal bruit, masses, or hepatosplenomegaly  EXTREMITIES:  2+ Peripheral Pulses. No clubbing, cyanosis, or edema, warm  NEUROLOGY: No asterixis or tremor.   SKIN: Intact, no jaundice            LABS:                        12.0   3.87  )-----------( 237      ( 06 Jul 2020 05:51 )             35.5     07-06    141  |  103  |  12  ----------------------------<  92  3.7   |  29  |  0.8    Ca    8.7      06 Jul 2020 05:51  Mg     1.7     07-06    TPro  5.7<L>  /  Alb  3.7  /  TBili  0.2  /  DBili  x   /  AST  19  /  ALT  17  /  AlkPhos  89  07-06    LIVER FUNCTIONS - ( 06 Jul 2020 05:51 )  Alb: 3.7 g/dL / Pro: 5.7 g/dL / ALK PHOS: 89 U/L / ALT: 17 U/L / AST: 19 U/L / GGT: x               IMAGING: 59yFemale  Being followed for infectious colitis   Interval history: Patient denies nausea, vomiting, hematemesis, melena, blood in stool, constipation, abdominal pain. Patient feeling much better down to 2 watery loose bowel movements from 7 bowel movements.      PAST MEDICAL & SURGICAL HISTORY:   Hypothyroid  Spine anomaly  Uterine cancer, sarcoma  Cervical cancer  Brain tumor, recurrent  Brain aneurysm  Vertigo  Cessation of menses: c section  H/O Spinal surgery: tumor removed plates in neck  H/O cerebral aneurysm repair  H/O abdominal hysterectomy          Social History: No smoking. No alcohol. No illegal drug use.          MEDICATIONS  (STANDING):  cefTRIAXone   IVPB 1000 milliGRAM(s) IV Intermittent every 24 hours  famotidine    Tablet 20 milliGRAM(s) Oral at bedtime  levETIRAcetam 1500 milliGRAM(s) Oral two times a day  levothyroxine 350 MICROGram(s) Oral <User Schedule>  metroNIDAZOLE  IVPB 500 milliGRAM(s) IV Intermittent every 8 hours  pantoprazole    Tablet 40 milliGRAM(s) Oral before breakfast    MEDICATIONS  (PRN):  acetaminophen   Tablet .. 650 milliGRAM(s) Oral every 6 hours PRN Mild Pain (1 - 3)  diphenhydrAMINE 25 milliGRAM(s) Oral every 6 hours PRN Rash and/or Itching  oxycodone    5 mG/acetaminophen 325 mG 1 Tablet(s) Oral every 6 hours PRN Severe Pain (7 - 10)      Allergies:     ciprofloxacin (Pruritus)          REVIEW OF SYSTEMS:  General:  No weight loss, fevers, or chills.  Eyes:  No reported pain or visual changes  ENT:  No sore throat or runny nose.  NECK: No stiffness   CV:  No chest pain or palpitations.  Resp:  No shortness of breath, cough  GI:  No abdominal pain, nausea, vomiting, dysphagia, +diarrhea No constipation. No rectal bleeding, melena, or hematemesis.  Muscle:  No aches or weakness  Neuro:  No tingling, numbness         VITAL SIGNS:   T(F): 97 (07-06-20 @ 14:27), Max: 97.3 (07-06-20 @ 05:15)  HR: 62 (07-06-20 @ 14:27) (60 - 62)  BP: 177/79 (07-06-20 @ 14:27) (125/76 - 177/79)  RR: 16 (07-06-20 @ 05:15) (16 - 16)  SpO2: --    PHYSICAL EXAM:  GENERAL: AAOx3, no acute distress.  HEAD:  Atraumatic, Normocephalic  EYES: conjunctiva and sclera clear  NECK: Supple, no JVD or thyromegaly  CHEST/LUNG: Clear to auscultation bilaterally; No wheeze, rhonchi, or rales  HEART: Regular rate and rhythm; normal S1, S2, No murmurs.  ABDOMEN: Soft, nontender, nondistended; Bowel sounds present, no abdominal bruit, masses, or hepatosplenomegaly  NEUROLOGY: No asterixis or tremor.   SKIN: Intact, no jaundice            LABS:                        12.0   3.87  )-----------( 237      ( 06 Jul 2020 05:51 )             35.5     07-06    141  |  103  |  12  ----------------------------<  92  3.7   |  29  |  0.8    Ca    8.7      06 Jul 2020 05:51  Mg     1.7     07-06    TPro  5.7<L>  /  Alb  3.7  /  TBili  0.2  /  DBili  x   /  AST  19  /  ALT  17  /  AlkPhos  89  07-06    LIVER FUNCTIONS - ( 06 Jul 2020 05:51 )  Alb: 3.7 g/dL / Pro: 5.7 g/dL / ALK PHOS: 89 U/L / ALT: 17 U/L / AST: 19 U/L / GGT: x               IMAGING:    < from: CT Abdomen and Pelvis w/ IV Cont (07.02.20 @ 19:40) >    EXAM:  CT ABDOMEN AND PELVIS IC            PROCEDURE DATE:  07/02/2020            INTERPRETATION:  CLINICAL HISTORY: Abdominal pain. Fever.    TECHNIQUE: Contiguous axial CT images were obtained from the lower chest to the pubic symphysis followingadministration of Optiray intravenous contrast. Oral contrast was not administered. Reformatted images in the coronal and sagittal planes were acquired.    COMPARISON: None.      FINDINGS:    LOWER CHEST: Dependent atelectasis    HEPATOBILIARY: Unremarkable.    SPLEEN: Unremarkable.    PANCREAS: Unremarkable.    ADRENAL GLANDS: Nodular thickening of the left adrenal gland.     KIDNEYS: Nonobstructing left renal calculus. No hydronephrosis.    ABDOMINOPELVIC NODES: Unremarkable    PELVIC ORGANS: Status post hysterectomy. Underdistended bladder limiting evaluation.    PERITONEUM/MESENTERY/BOWEL: Diffuse colonic wall thickening with surrounding fat stranding, compatible with pancolitis. No evidence of bowel obstruction. Unremarkable appendix. Colonic diverticulosis. No free air or fluid collection.    BONES/SOFT TISSUES: Degenerative changes of the spine. Left-sided L5 pars defect.    OTHER: 1.9 cm splenic artery aneurysm (4/124)      IMPRESSION:    1.  Pancolitis, infectious or inflammatoryin etiology.  2.  1.9 cm splenic artery aneurysm                   SUNITA TELLES M.D., ATTENDING RADIOLOGIST  This document has been electronically signed. Jul 2 2020  8:35PM        < end of copied text > 59yFemale  Being followed for infectious colitis  (campylobacter jejuni)  Interval history: Patient denies nausea, vomiting, hematemesis, melena, blood in stool, constipation, abdominal pain. Patient feeling much better down to 2 watery loose bowel movements from 7 bowel movements.      PAST MEDICAL & SURGICAL HISTORY:   Hypothyroid  Spine anomaly  Uterine cancer, sarcoma  Cervical cancer  Brain tumor, recurrent  Brain aneurysm  Vertigo  Cessation of menses: c section  H/O Spinal surgery: tumor removed plates in neck  H/O cerebral aneurysm repair  H/O abdominal hysterectomy          Social History: No smoking. No alcohol. No illegal drug use.          MEDICATIONS  (STANDING):  cefTRIAXone   IVPB 1000 milliGRAM(s) IV Intermittent every 24 hours  famotidine    Tablet 20 milliGRAM(s) Oral at bedtime  levETIRAcetam 1500 milliGRAM(s) Oral two times a day  levothyroxine 350 MICROGram(s) Oral <User Schedule>  metroNIDAZOLE  IVPB 500 milliGRAM(s) IV Intermittent every 8 hours  pantoprazole    Tablet 40 milliGRAM(s) Oral before breakfast    MEDICATIONS  (PRN):  acetaminophen   Tablet .. 650 milliGRAM(s) Oral every 6 hours PRN Mild Pain (1 - 3)  diphenhydrAMINE 25 milliGRAM(s) Oral every 6 hours PRN Rash and/or Itching  oxycodone    5 mG/acetaminophen 325 mG 1 Tablet(s) Oral every 6 hours PRN Severe Pain (7 - 10)      Allergies:     ciprofloxacin (Pruritus)          REVIEW OF SYSTEMS:  General:  No weight loss, fevers, or chills.  Eyes:  No reported pain or visual changes  ENT:  No sore throat or runny nose.  NECK: No stiffness   CV:  No chest pain or palpitations.  Resp:  No shortness of breath, cough  GI:  No abdominal pain, nausea, vomiting, dysphagia, +diarrhea No constipation. No rectal bleeding, melena, or hematemesis.  Muscle:  No aches or weakness  Neuro:  No tingling, numbness         VITAL SIGNS:   T(F): 97 (07-06-20 @ 14:27), Max: 97.3 (07-06-20 @ 05:15)  HR: 62 (07-06-20 @ 14:27) (60 - 62)  BP: 177/79 (07-06-20 @ 14:27) (125/76 - 177/79)  RR: 16 (07-06-20 @ 05:15) (16 - 16)  SpO2: --    PHYSICAL EXAM:  GENERAL: AAOx3, no acute distress.  HEAD:  Atraumatic, Normocephalic  EYES: conjunctiva and sclera clear  NECK: Supple, no JVD or thyromegaly  CHEST/LUNG: Clear to auscultation bilaterally; No wheeze, rhonchi, or rales  HEART: Regular rate and rhythm; normal S1, S2, No murmurs.  ABDOMEN: Soft, nontender, nondistended; Bowel sounds present, no abdominal bruit, masses, or hepatosplenomegaly  NEUROLOGY: No asterixis or tremor.   SKIN: Intact, no jaundice            LABS:                        12.0   3.87  )-----------( 237      ( 06 Jul 2020 05:51 )             35.5     07-06    141  |  103  |  12  ----------------------------<  92  3.7   |  29  |  0.8    Ca    8.7      06 Jul 2020 05:51  Mg     1.7     07-06    TPro  5.7<L>  /  Alb  3.7  /  TBili  0.2  /  DBili  x   /  AST  19  /  ALT  17  /  AlkPhos  89  07-06    LIVER FUNCTIONS - ( 06 Jul 2020 05:51 )  Alb: 3.7 g/dL / Pro: 5.7 g/dL / ALK PHOS: 89 U/L / ALT: 17 U/L / AST: 19 U/L / GGT: x               IMAGING:    < from: CT Abdomen and Pelvis w/ IV Cont (07.02.20 @ 19:40) >    EXAM:  CT ABDOMEN AND PELVIS IC            PROCEDURE DATE:  07/02/2020            INTERPRETATION:  CLINICAL HISTORY: Abdominal pain. Fever.    TECHNIQUE: Contiguous axial CT images were obtained from the lower chest to the pubic symphysis followingadministration of Optiray intravenous contrast. Oral contrast was not administered. Reformatted images in the coronal and sagittal planes were acquired.    COMPARISON: None.      FINDINGS:    LOWER CHEST: Dependent atelectasis    HEPATOBILIARY: Unremarkable.    SPLEEN: Unremarkable.    PANCREAS: Unremarkable.    ADRENAL GLANDS: Nodular thickening of the left adrenal gland.     KIDNEYS: Nonobstructing left renal calculus. No hydronephrosis.    ABDOMINOPELVIC NODES: Unremarkable    PELVIC ORGANS: Status post hysterectomy. Underdistended bladder limiting evaluation.    PERITONEUM/MESENTERY/BOWEL: Diffuse colonic wall thickening with surrounding fat stranding, compatible with pancolitis. No evidence of bowel obstruction. Unremarkable appendix. Colonic diverticulosis. No free air or fluid collection.    BONES/SOFT TISSUES: Degenerative changes of the spine. Left-sided L5 pars defect.    OTHER: 1.9 cm splenic artery aneurysm (4/124)      IMPRESSION:    1.  Pancolitis, infectious or inflammatoryin etiology.  2.  1.9 cm splenic artery aneurysm                   SUNITA TELLES M.D., ATTENDING RADIOLOGIST  This document has been electronically signed. Jul 2 2020  8:35PM        < end of copied text >

## 2020-07-06 NOTE — DISCHARGE NOTE PROVIDER - NSDCCPCAREPLAN_GEN_ALL_CORE_FT
PRINCIPAL DISCHARGE DIAGNOSIS  Diagnosis: Pancolitis  Assessment and Plan of Treatment: continue with current antibiotic.  outpatient follow up with [facility]      SECONDARY DISCHARGE DIAGNOSES  Diagnosis: Acute nonintractable headache, unspecified headache type  Assessment and Plan of Treatment: resolved PRINCIPAL DISCHARGE DIAGNOSIS  Diagnosis: Pancolitis  Assessment and Plan of Treatment: continue with current antibiotic.  outpatient follow up with [facility]      SECONDARY DISCHARGE DIAGNOSES  Diagnosis: Splenic artery aneurysm  Assessment and Plan of Treatment: please follow up with vascular surgery for further care in one week    Diagnosis: Acute nonintractable headache, unspecified headache type  Assessment and Plan of Treatment: resolved

## 2020-07-06 NOTE — PROGRESS NOTE ADULT - ASSESSMENT
Patient is 58 yo female with hx of Brain aneurysm, Brain tumor, recurrent, Cervical cancer, Hypothyroid, Spine anomaly, Uterine cancer, sarcoma and Vertigo presenting with       1.  Acute pancolitis  -BC negative  -stool culture shows Campylobacter species.  Stool occult+.  H/H stable  -tolerating diet well.  Loose stool  -IV antibiotic  -GI to follow up     2. splenic artery aneurysm  -Vascular recommended outpatient follow up        3. Hypothyroidism  -Continue with levothyroxine    4. hx of brain cancer  -Continue with keppra        #Progress Note Handoff  Pending (specify):  as above  Family discussion:  plan of care was discussed with patient in details.  all questions were answered.  seems to understand, and in agreement  Disposition:  unknown

## 2020-07-06 NOTE — DISCHARGE NOTE PROVIDER - HOSPITAL COURSE
Patient is 60 yo female with hx of Brain aneurysm, Brain tumor, recurrent, Cervical cancer, Hypothyroid, Spine anomaly, Uterine cancer, sarcoma and Vertigo presenting with             1.  Acute pancolitis    -BC negative    -stool culture shows Campylobacter species.  Stool occult+.  H/H stable    -tolerating diet well.  Continue with po antibiotic.  Outpatient follow up with GI        2. Splenic artery aneurysm    -Vascular recommended outpatient follow up              3. Hypothyroidism    -Continue with levothyroxine        4. hx of brain cancer    -Continue with keppra        Patient was seen and examined today    feels better    Offers no new complaints    tolerating diet well    diarrhea resolved                        Constitutional: No fever, fatigue or weight loss.    Skin: No rash.    Eyes: No recent vision problems or eye pain.    ENT: No congestion, ear pain, or sore throat.    Endocrine: No thyroid problems.    Cardiovascular: No chest pain or palpation.    Respiratory: No cough, shortness of breath, congestion, or wheezing.    Gastrointestinal: No abdominal pain, nausea, vomiting, or diarrhea.    Genitourinary: No dysuria.    Musculoskeletal: No joint swelling.    Neurologic: No headache.            Vital Signs Last 24 Hrs    T(C): 36.3 (07-06-20 @ 05:15), Max: 36.3 (07-06-20 @ 05:15)    T(F): 97.3 (07-06-20 @ 05:15), Max: 97.3 (07-06-20 @ 05:15)    HR: 60 (07-06-20 @ 05:15) (60 - 62)    BP: 125/76 (07-06-20 @ 05:15) (125/76 - 152/70)    BP(mean): --    RR: 16 (07-06-20 @ 05:15) (16 - 16)    SpO2: --                PHYSICAL EXAM-    GENERAL: NAD, well-groomed, well-developed    HEAD:  Atraumatic, Normocephalic    EYES: EOMI, PERRLA, conjunctiva and sclera clear    NECK: Supple, No JVD, Normal thyroid    NERVOUS SYSTEM:  Alert & Oriented X3, Motor Strength 5/5 B/L upper and lower extremities; DTRs 2+ intact and symmetric    CHEST/LUNG: Clear to percussion bilaterally; No rales, rhonchi, wheezing, or rubs    HEART: Regular rate and rhythm; No murmurs, rubs, or gallops    ABDOMEN: Soft, Nontender, Nondistended; Bowel sounds present    EXTREMITIES:  2+ Peripheral Pulses, No clubbing, cyanosis, or edema    SKIN: No rashes or lesions         Hospital course, and discharge planning were discussed with patient in details.  all questions were answered    seems to understand, and in agreement    time 70 min Patient is 60 yo female with hx of Brain aneurysm, Brain tumor, recurrent, Cervical cancer, Hypothyroid, Spine anomaly, Uterine cancer, sarcoma and Vertigo presenting with             1.  Acute pancolitis    -BC negative    -stool culture shows Campylobacter species.  Stool occult+.  H/H stable    -tolerating diet well.  Continue with po antibiotic.  Outpatient follow up with GI        2. Splenic artery aneurysm    -Vascular recommended outpatient follow up              3. Hypothyroidism    -Continue with levothyroxine        4. hx of brain cancer    -Continue with keppra        Patient was seen and examined this afternoon.  one BM today, wants to go home    feels better    Offers no new complaints    tolerating diet well                            Constitutional: No fever, fatigue or weight loss.    Skin: No rash.    Eyes: No recent vision problems or eye pain.    ENT: No congestion, ear pain, or sore throat.    Endocrine: No thyroid problems.    Cardiovascular: No chest pain or palpation.    Respiratory: No cough, shortness of breath, congestion, or wheezing.    Gastrointestinal: No abdominal pain, nausea, vomiting, or diarrhea.    Genitourinary: No dysuria.    Musculoskeletal: No joint swelling.    Neurologic: No headache.            Vital Signs Last 24 Hrs    T(C): 36.3 (07-06-20 @ 05:15), Max: 36.3 (07-06-20 @ 05:15)    T(F): 97.3 (07-06-20 @ 05:15), Max: 97.3 (07-06-20 @ 05:15)    HR: 60 (07-06-20 @ 05:15) (60 - 62)    BP: 125/76 (07-06-20 @ 05:15) (125/76 - 152/70)    BP(mean): --    RR: 16 (07-06-20 @ 05:15) (16 - 16)    SpO2: --                PHYSICAL EXAM-    GENERAL: NAD, well-groomed, well-developed    HEAD:  Atraumatic, Normocephalic    EYES: EOMI, PERRLA, conjunctiva and sclera clear    NECK: Supple, No JVD, Normal thyroid    NERVOUS SYSTEM:  Alert & Oriented X3, Motor Strength 5/5 B/L upper and lower extremities; DTRs 2+ intact and symmetric    CHEST/LUNG: Clear to percussion bilaterally; No rales, rhonchi, wheezing, or rubs    HEART: Regular rate and rhythm; No murmurs, rubs, or gallops    ABDOMEN: Soft, Nontender, Nondistended; Bowel sounds present    EXTREMITIES:  2+ Peripheral Pulses, No clubbing, cyanosis, or edema    SKIN: No rashes or lesions         Hospital course, and discharge planning were discussed with patient in details.  all questions were answered    seems to understand, and in agreement    time 70 min

## 2020-07-06 NOTE — CDI QUERY NOTE - NSCDIOTHERTXTBX3_GEN_ALL_CORE_FT
This patients BMI is 40.3    Based on above clinical findings and your professional judgment, please indicate if additional DX is appropriate    ?	Morbid Obesity  ?	Other please specify  ?	Clinically unable to determine

## 2020-07-06 NOTE — DISCHARGE NOTE PROVIDER - CARE PROVIDER_API CALL
Sarah Ross  INTERNAL MEDICINE  [address] [place] [id number]  Phone: [phone]  Fax: [phone]  Follow Up Time: 1 week Sarah Ross  INTERNAL MEDICINE  [address] [place] [address]  Phone: [phone]  Fax: [phone]  Follow Up Time: 1 week    Nicko Medrano  [address] [address]  Phone: [phone]  Fax: [phone]  Follow Up Time: 1 week

## 2020-07-06 NOTE — DISCHARGE NOTE NURSING/CASE MANAGEMENT/SOCIAL WORK - PATIENT PORTAL LINK FT
You can access the FollowMyHealth Patient Portal offered by [facility] by registering at the following website: [email or web address]. By joining [facility]’s FollowMyHealth portal, you will also be able to view your health information using other applications (apps) compatible with our system.

## 2020-07-06 NOTE — DISCHARGE NOTE NURSING/CASE MANAGEMENT/SOCIAL WORK - NSDCFUADDAPPT_GEN_ALL_CORE_FT
Please follow up with your doctor in one week  Please follow up with gastroenterology in 1-2 weeks  Please follow up with vascular in one wee  Please come back to the hospital if you develop any new symptoms or concerns

## 2020-07-06 NOTE — CDI QUERY NOTE - NSCDIOTHERTXTBX_GEN_ALL_CORE_HH
59F presented w/ chills, fever, back pain, headache, dizziness, diarrhea, weakness. Covid neg    Admit v/s T 100.6  wbc 10.7 lactate 1.2,     ED “ CT abd/pelvis shows source of fever and patient has pan-colitis”    7/6 ID Consult reflect >” Sepsis ( Fever + Tachycardia )# Pancolitis- due to Campylobacter species  - C.diff  neg.”   wbc 3.87    Based on above clinical findings and your professional judgment, the DX of Sepsis need further clarification. Do you agree with ID documentation.    ?	Sepsis Present on admit  NO  ?	Sepsis Present on admit, resolved    YES  ?	No sepsis this admission  ?	Other please specify  ?	Clinically unable to determine

## 2020-07-06 NOTE — DISCHARGE NOTE PROVIDER - NSDCMRMEDTOKEN_GEN_ALL_CORE_FT
acetaminophen 325 mg oral tablet: 2 tab(s) orally every 6 hours, As needed, Mild Pain (1 - 3)  cefuroxime 500 mg oral tablet: 1 tab(s) orally every 12 hours   famotidine:   Keppra 250 mg oral tablet: 12 tab(s) orally once a day  levothyroxine 300 mcg (0.3 mg) oral tablet: 1 tab(s) orally once a day  levothyroxine 50 mcg (0.05 mg) oral tablet: 1 tab(s) orally once a day  metroNIDAZOLE 500 mg oral tablet: 1 tab(s) orally 3 times a day   omeprazole 40 mg oral delayed release capsule: 1 cap(s) orally once a day  Premarin 0.625 mg oral tablet: acetaminophen 325 mg oral tablet: 2 tab(s) orally every 6 hours, As needed, Mild Pain (1 - 3)  azithromycin 500 mg oral tablet: 1 tab(s) orally once a day   cefuroxime 500 mg oral tablet: 1 tab(s) orally every 12 hours   famotidine:   Keppra 250 mg oral tablet: 12 tab(s) orally once a day  levothyroxine 300 mcg (0.3 mg) oral tablet: 1 tab(s) orally once a day  levothyroxine 50 mcg (0.05 mg) oral tablet: 1 tab(s) orally once a day  metroNIDAZOLE 500 mg oral tablet: 1 tab(s) orally 3 times a day   omeprazole 40 mg oral delayed release capsule: 1 cap(s) orally once a day  Premarin 0.625 mg oral tablet:

## 2020-07-06 NOTE — DISCHARGE NOTE PROVIDER - PROVIDER TOKENS
PROVIDER:[TOKEN:[[id number]],FOLLOWUP:[1 week]] PROVIDER:[TOKEN:[[id number]],FOLLOWUP:[1 week]],PROVIDER:[TOKEN:[[id number]],FOLLOWUP:[1 week]]

## 2020-07-06 NOTE — PROGRESS NOTE ADULT - SUBJECTIVE AND OBJECTIVE BOX
CC.  Abdm pain  HPI.  Patient reports abdm pain resolving.  Multiple Loose stool.  afebrile  Tolerating regular diet well  offers no other complaints              Constitutional: No fatigue or weight loss.  Skin: No rash.  Eyes: No recent vision problems or eye pain.  ENT: No congestion, ear pain, or sore throat.  Endocrine: No thyroid problems.  Cardiovascular: No chest pain or palpation.  Respiratory: No cough, shortness of breath, congestion, or wheezing.  Gastrointestinal: No  nausea, vomiting,   Genitourinary: No dysuria.  Musculoskeletal: No joint swelling.  Neurologic: No headache.    Vital Signs Last 24 Hrs  T(C): 36.3 (06 Jul 2020 05:15), Max: 36.3 (06 Jul 2020 05:15)  T(F): 97.3 (06 Jul 2020 05:15), Max: 97.3 (06 Jul 2020 05:15)  HR: 60 (06 Jul 2020 05:15) (60 - 62)  BP: 125/76 (06 Jul 2020 05:15) (125/76 - 152/70)  BP(mean): --  RR: 16 (06 Jul 2020 05:15) (16 - 16)  SpO2: --      PHYSICAL EXAM-  GENERAL: NAD, chronic ill appearing female  HEAD:  Atraumatic, Normocephalic  EYES: EOMI, PERRLA, conjunctiva and sclera clear  NECK: Supple, No JVD, Normal thyroid  NERVOUS SYSTEM:  Alert & Oriented X3, Moving all extremities  CHEST/LUNG: Clear to percussion bilaterally; No rales, rhonchi, wheezing, or rubs  HEART: Regular rate and rhythm; No murmurs, rubs, or gallops  ABDOMEN: Soft, Nontender, Nondistended; hyperactive BS  EXTREMITIES:   No clubbing, cyanosis, or edema  SKIN: No rashes or lesions        MEDICATIONS  (STANDING):  azithromycin   Tablet 500 milliGRAM(s) Oral daily  cefTRIAXone   IVPB 1000 milliGRAM(s) IV Intermittent every 24 hours  famotidine    Tablet 20 milliGRAM(s) Oral at bedtime  levETIRAcetam 1500 milliGRAM(s) Oral two times a day  levothyroxine 350 MICROGram(s) Oral <User Schedule>  metroNIDAZOLE  IVPB 500 milliGRAM(s) IV Intermittent every 8 hours  pantoprazole    Tablet 40 milliGRAM(s) Oral before breakfast    MEDICATIONS  (PRN):  acetaminophen   Tablet .. 650 milliGRAM(s) Oral every 6 hours PRN Mild Pain (1 - 3)  diphenhydrAMINE 25 milliGRAM(s) Oral every 6 hours PRN Rash and/or Itching  oxycodone    5 mG/acetaminophen 325 mG 1 Tablet(s) Oral every 6 hours PRN Severe Pain (7 - 10)                            12.0   3.87  )-----------( 237      ( 06 Jul 2020 05:51 )             35.5     07-06    141  |  103  |  12  ----------------------------<  92  3.7   |  29  |  0.8    Ca    8.7      06 Jul 2020 05:51  Mg     1.7     07-06    TPro  5.7<L>  /  Alb  3.7  /  TBili  0.2  /  DBili  x   /  AST  19  /  ALT  17  /  AlkPhos  89  07-06                                      [x ] YES  [ ] NO    Consultant(s) Notes Reviewed:  [x ] YES  [ ] NO    Care Discussed with Consultants/Other Providers [x ] YES  [ ] No medical contraindication for discharge

## 2020-07-06 NOTE — PROGRESS NOTE ADULT - ASSESSMENT
59yFemale pmh UC diagnosed by Dr. Mott 5-6 years ago, hypothyroidism. Vertigo presents with RLQ and LLQ abdominal pain for 3 days that was 10/10 not exacerbated by food. Patient reports diarrhea 2-3 episodes a day watery brown without blood. Patient reports baseline she has 1 or 2 bowel movements daily formed without blood.    Obtained latest EGD/Colonoscopy records from Dr. Mott as below  EGD 7/6/2019 Dr. Mott  Impressions  -Erosive Esophagitis stage 1   -small hiatal hernia   -flat gastric polyp 0.5cm biopsied  -Erosive gastritis with multiple erosions  -Moderate duodenitis   Pathology remarkable for esophageal biopsies revealing early intestinal metaplasia consistent with non-goblet cell type pettit's esophagus no dysplasia  negative H-pylori     Colonoscopy 6/7/18 Dr. Mott  Impressions  -Second degree hemorrhoids  -Diverticulosis moderate   -1cm flat polyp removed from sigmoid   Pathology polyp is hyperplastic, biopsies without acute or chronic colitis path negative for dysplasia     Problem 1-Pancolitis UC diagnosed by Dr. Mott as per patient, mild diarrhea no blood   abdominal pain---->resolved   -No evidence of IBD on Prior Colonoscopy record 2018  -Pancolitis secondary to Camplobacter jejuni on GI- PCR  Rec  -Diarrhea is self-limited and lasts for a mean of seven days. Abdominal pain may persist after resolution of diarrhea  -Azithromycin abx of choice for C-jejuni, azithromycin is 500 mg orally daily for three days or until signs and symptoms of disease have improved. A single 1 g oral dose of azithromycin may be equally effective  -Doubt UC flare   -At current symptoms have resolved  -Diet as tolerated, low fat low residue lactose free  -Steroids not indicated  -No acute GI intervention planned   -Follow up with patient's private GI Dr. Mott outpatient to consider repeat colonoscopy if benefits outweigh risks s/p resolution of pancolitis     Problem 2-  1.9 cm splenic artery aneurysm   Rec  - Care as per primary team and vascular team    Problem 3-Theodore's esophagus history  Rec  -Follow up with patient's private GI Dr. Mott and continue PPI therapy 59yFemale pmh UC diagnosed by Dr. Mott 5-6 years ago, hypothyroidism. Vertigo presents with RLQ and LLQ abdominal pain for 3 days that was 10/10 not exacerbated by food. Patient with infectious colitis     Obtained latest EGD/Colonoscopy records from Dr. Mott as below  EGD 7/6/2019 Dr. Mott  Impressions  -Erosive Esophagitis stage 1   -small hiatal hernia   -flat gastric polyp 0.5cm biopsied  -Erosive gastritis with multiple erosions  -Moderate duodenitis   Pathology remarkable for esophageal biopsies revealing early intestinal metaplasia consistent with non-goblet cell type pettit's esophagus no dysplasia  negative H-pylori     Colonoscopy 6/7/18 Dr. Mott  Impressions  -Second degree hemorrhoids  -Diverticulosis moderate   -1cm flat polyp removed from sigmoid   Pathology polyp is hyperplastic, biopsies without acute or chronic colitis path negative for dysplasia     Problem 1-Pancolitis, mild diarrhea no blood   abdominal pain---->resolved   -No evidence of IBD on Prior Colonoscopy record 2018  -Pancolitis secondary to Camplobacter jejuni on GI- PCR  Rec  -Diarrhea is self-limited and lasts for a mean of seven days. Abdominal pain may persist after resolution of diarrhea  -Azithromycin abx of choice for C-jejuni, azithromycin is 500 mg orally daily for three days or until signs and symptoms of disease have improved. A single 1 g oral dose of azithromycin may be equally effective  -At current symptoms have improving   -Diet as tolerated, low fat low residue lactose free  -No acute GI intervention planned   -Follow up with patient's private GI Dr. Mott outpatient to consider repeat colonoscopy if benefits outweigh risks s/p resolution of pancolitis     Problem 2-  1.9 cm splenic artery aneurysm   Rec  - Care as per primary team and vascular team    Problem 3-Theodore's esophagus history  Rec  -Follow up with patient's private GI Dr. Mott and continue PPI therapy

## 2020-07-06 NOTE — CDI QUERY NOTE - NSCDIOTHERTXTBX2_GEN_ALL_CORE_FT
Laboratory findings     7/3>  Mg 1.6    7/6  1.7     7/3 RX: 1 gm  Mg Sulfate IV     Based on professional judgment and above clinical findings, please indicate if another DX is applicable    ?	Hypomagnesemia  ?	Other please specify  ?	Clinically unable to determine

## 2020-07-07 PROBLEM — Z00.00 ENCOUNTER FOR PREVENTIVE HEALTH EXAMINATION: Status: ACTIVE | Noted: 2020-07-07

## 2020-07-08 LAB
CULTURE RESULTS: SIGNIFICANT CHANGE UP
CULTURE RESULTS: SIGNIFICANT CHANGE UP
SPECIMEN SOURCE: SIGNIFICANT CHANGE UP
SPECIMEN SOURCE: SIGNIFICANT CHANGE UP

## 2020-07-09 ENCOUNTER — EMERGENCY (EMERGENCY)
Facility: HOSPITAL | Age: 60
LOS: 0 days | Discharge: HOME | End: 2020-07-09
Attending: EMERGENCY MEDICINE | Admitting: EMERGENCY MEDICINE
Payer: COMMERCIAL

## 2020-07-09 VITALS
TEMPERATURE: 97 F | OXYGEN SATURATION: 98 % | RESPIRATION RATE: 17 BRPM | HEART RATE: 60 BPM | SYSTOLIC BLOOD PRESSURE: 131 MMHG | DIASTOLIC BLOOD PRESSURE: 65 MMHG

## 2020-07-09 VITALS — HEIGHT: 68 IN

## 2020-07-09 DIAGNOSIS — Z88.1 ALLERGY STATUS TO OTHER ANTIBIOTIC AGENTS STATUS: ICD-10-CM

## 2020-07-09 DIAGNOSIS — R30.9 PAINFUL MICTURITION, UNSPECIFIED: ICD-10-CM

## 2020-07-09 DIAGNOSIS — E03.9 HYPOTHYROIDISM, UNSPECIFIED: ICD-10-CM

## 2020-07-09 DIAGNOSIS — Z98.890 OTHER SPECIFIED POSTPROCEDURAL STATES: Chronic | ICD-10-CM

## 2020-07-09 DIAGNOSIS — N91.2 AMENORRHEA, UNSPECIFIED: Chronic | ICD-10-CM

## 2020-07-09 DIAGNOSIS — Z79.899 OTHER LONG TERM (CURRENT) DRUG THERAPY: ICD-10-CM

## 2020-07-09 DIAGNOSIS — R39.11 HESITANCY OF MICTURITION: ICD-10-CM

## 2020-07-09 DIAGNOSIS — Z85.41 PERSONAL HISTORY OF MALIGNANT NEOPLASM OF CERVIX UTERI: ICD-10-CM

## 2020-07-09 DIAGNOSIS — N32.89 OTHER SPECIFIED DISORDERS OF BLADDER: ICD-10-CM

## 2020-07-09 LAB
ALBUMIN SERPL ELPH-MCNC: 4.2 G/DL — SIGNIFICANT CHANGE UP (ref 3.5–5.2)
ALP SERPL-CCNC: 112 U/L — SIGNIFICANT CHANGE UP (ref 30–115)
ALT FLD-CCNC: 51 U/L — HIGH (ref 0–41)
ANION GAP SERPL CALC-SCNC: 9 MMOL/L — SIGNIFICANT CHANGE UP (ref 7–14)
APPEARANCE UR: CLEAR — SIGNIFICANT CHANGE UP
AST SERPL-CCNC: 48 U/L — HIGH (ref 0–41)
BACTERIA # UR AUTO: ABNORMAL
BASOPHILS # BLD AUTO: 0.04 K/UL — SIGNIFICANT CHANGE UP (ref 0–0.2)
BASOPHILS NFR BLD AUTO: 0.6 % — SIGNIFICANT CHANGE UP (ref 0–1)
BILIRUB SERPL-MCNC: 0.3 MG/DL — SIGNIFICANT CHANGE UP (ref 0.2–1.2)
BILIRUB UR-MCNC: NEGATIVE — SIGNIFICANT CHANGE UP
BUN SERPL-MCNC: 14 MG/DL — SIGNIFICANT CHANGE UP (ref 10–20)
CALCIUM SERPL-MCNC: 9.5 MG/DL — SIGNIFICANT CHANGE UP (ref 8.5–10.1)
CHLORIDE SERPL-SCNC: 101 MMOL/L — SIGNIFICANT CHANGE UP (ref 98–110)
CO2 SERPL-SCNC: 30 MMOL/L — SIGNIFICANT CHANGE UP (ref 17–32)
COLOR SPEC: YELLOW — SIGNIFICANT CHANGE UP
CREAT SERPL-MCNC: 1 MG/DL — SIGNIFICANT CHANGE UP (ref 0.7–1.5)
DIFF PNL FLD: ABNORMAL
EOSINOPHIL # BLD AUTO: 0.25 K/UL — SIGNIFICANT CHANGE UP (ref 0–0.7)
EOSINOPHIL NFR BLD AUTO: 3.8 % — SIGNIFICANT CHANGE UP (ref 0–8)
EPI CELLS # UR: ABNORMAL /HPF
GLUCOSE SERPL-MCNC: 95 MG/DL — SIGNIFICANT CHANGE UP (ref 70–99)
GLUCOSE UR QL: NEGATIVE MG/DL — SIGNIFICANT CHANGE UP
HCT VFR BLD CALC: 41.1 % — SIGNIFICANT CHANGE UP (ref 37–47)
HGB BLD-MCNC: 13.5 G/DL — SIGNIFICANT CHANGE UP (ref 12–16)
IMM GRANULOCYTES NFR BLD AUTO: 0.5 % — HIGH (ref 0.1–0.3)
KETONES UR-MCNC: NEGATIVE — SIGNIFICANT CHANGE UP
LEUKOCYTE ESTERASE UR-ACNC: ABNORMAL
LYMPHOCYTES # BLD AUTO: 1.84 K/UL — SIGNIFICANT CHANGE UP (ref 1.2–3.4)
LYMPHOCYTES # BLD AUTO: 27.8 % — SIGNIFICANT CHANGE UP (ref 20.5–51.1)
MCHC RBC-ENTMCNC: 30 PG — SIGNIFICANT CHANGE UP (ref 27–31)
MCHC RBC-ENTMCNC: 32.8 G/DL — SIGNIFICANT CHANGE UP (ref 32–37)
MCV RBC AUTO: 91.3 FL — SIGNIFICANT CHANGE UP (ref 81–99)
MONOCYTES # BLD AUTO: 0.6 K/UL — SIGNIFICANT CHANGE UP (ref 0.1–0.6)
MONOCYTES NFR BLD AUTO: 9.1 % — SIGNIFICANT CHANGE UP (ref 1.7–9.3)
NEUTROPHILS # BLD AUTO: 3.85 K/UL — SIGNIFICANT CHANGE UP (ref 1.4–6.5)
NEUTROPHILS NFR BLD AUTO: 58.2 % — SIGNIFICANT CHANGE UP (ref 42.2–75.2)
NITRITE UR-MCNC: NEGATIVE — SIGNIFICANT CHANGE UP
NRBC # BLD: 0 /100 WBCS — SIGNIFICANT CHANGE UP (ref 0–0)
PH UR: 6 — SIGNIFICANT CHANGE UP (ref 5–8)
PLATELET # BLD AUTO: 299 K/UL — SIGNIFICANT CHANGE UP (ref 130–400)
POTASSIUM SERPL-MCNC: 3.8 MMOL/L — SIGNIFICANT CHANGE UP (ref 3.5–5)
POTASSIUM SERPL-SCNC: 3.8 MMOL/L — SIGNIFICANT CHANGE UP (ref 3.5–5)
PROT SERPL-MCNC: 6.9 G/DL — SIGNIFICANT CHANGE UP (ref 6–8)
PROT UR-MCNC: NEGATIVE MG/DL — SIGNIFICANT CHANGE UP
RBC # BLD: 4.5 M/UL — SIGNIFICANT CHANGE UP (ref 4.2–5.4)
RBC # FLD: 13.4 % — SIGNIFICANT CHANGE UP (ref 11.5–14.5)
RBC CASTS # UR COMP ASSIST: ABNORMAL /HPF
SODIUM SERPL-SCNC: 140 MMOL/L — SIGNIFICANT CHANGE UP (ref 135–146)
SP GR SPEC: 1.02 — SIGNIFICANT CHANGE UP (ref 1.01–1.03)
UROBILINOGEN FLD QL: 0.2 MG/DL — SIGNIFICANT CHANGE UP (ref 0.2–0.2)
WBC # BLD: 6.61 K/UL — SIGNIFICANT CHANGE UP (ref 4.8–10.8)
WBC # FLD AUTO: 6.61 K/UL — SIGNIFICANT CHANGE UP (ref 4.8–10.8)

## 2020-07-09 PROCEDURE — 76857 US EXAM PELVIC LIMITED: CPT | Mod: 26

## 2020-07-09 PROCEDURE — 99284 EMERGENCY DEPT VISIT MOD MDM: CPT

## 2020-07-09 NOTE — ED ADULT NURSE NOTE - CHPI ED NUR SYMPTOMS NEG
no fever/no pain/no abdominal distension/no nausea/no chills/no blood in stool/no hematuria/no diarrhea

## 2020-07-09 NOTE — ED PROVIDER NOTE - CLINICAL SUMMARY MEDICAL DECISION MAKING FREE TEXT BOX
Bedside son reveals small amount of urine.  Pt sent for official sono,  labs done.  results d/w patient,  Rec f/u with PMD and her Gyn. Pt instructed to return if any worsening symptoms or concerns.  They verbalize understanding.

## 2020-07-09 NOTE — ED PROVIDER NOTE - OBJECTIVE STATEMENT
58 yo female with hx of Brain aneurysm, Brain tumor, recurrent, Cervical cancer, Hypothyroid, Spine anomaly, Uterine cancer, sarcoma and Vertigo, discharged from inpatient from pancolitis, currently on antibx, has appt for colonoscopy next week presents to the ED c/o urinary hesistency and decreased urinary output over 3 days. patient states symptoms worsened even with increasing her hydration. patient denies any back pain . no gross hematuria . patient denies any fever,chills.

## 2020-07-09 NOTE — ED PROVIDER NOTE - PATIENT PORTAL LINK FT
You can access the FollowMyHealth Patient Portal offered by [facility] by registering at the following website: [email or web address]. By joining [employer]’s FollowMyHealth portal, you will also be able to view your health information using other applications (apps) compatible with our system.

## 2020-07-09 NOTE — ED ADULT NURSE NOTE - NSIMPLEMENTINTERV_GEN_ALL_ED
Implemented All Universal Safety Interventions:  [place] to call system. Call bell, personal items and telephone within reach. Instruct patient to call for assistance. Room bathroom lighting operational. Non-slip footwear when patient is off stretcher. Physically safe environment: no spills, clutter or unnecessary equipment. Stretcher in lowest position, wheels locked, appropriate side rails in place.

## 2020-07-09 NOTE — ED PROVIDER NOTE - ATTENDING CONTRIBUTION TO CARE
60 yo F PMHx noted presents with c/o decreased urine output over the last few days.  Pt feels like she has to urinate but only small amounts coming out.  no back pain, no n/v, no fever or chills.  Pt was recently admitted for colitis and currently on abx.  On exam pt in NAD AAO x 3, Abd soft nt nd

## 2020-07-11 LAB
CULTURE RESULTS: NO GROWTH — SIGNIFICANT CHANGE UP
SPECIMEN SOURCE: SIGNIFICANT CHANGE UP

## 2020-07-13 DIAGNOSIS — E03.9 HYPOTHYROIDISM, UNSPECIFIED: ICD-10-CM

## 2020-07-13 DIAGNOSIS — E87.6 HYPOKALEMIA: ICD-10-CM

## 2020-07-13 DIAGNOSIS — Z85.41 PERSONAL HISTORY OF MALIGNANT NEOPLASM OF CERVIX UTERI: ICD-10-CM

## 2020-07-13 DIAGNOSIS — K22.70 BARRETT'S ESOPHAGUS WITHOUT DYSPLASIA: ICD-10-CM

## 2020-07-13 DIAGNOSIS — Z88.1 ALLERGY STATUS TO OTHER ANTIBIOTIC AGENTS STATUS: ICD-10-CM

## 2020-07-13 DIAGNOSIS — Z85.841 PERSONAL HISTORY OF MALIGNANT NEOPLASM OF BRAIN: ICD-10-CM

## 2020-07-13 DIAGNOSIS — T36.8X5A ADVERSE EFFECT OF OTHER SYSTEMIC ANTIBIOTICS, INITIAL ENCOUNTER: ICD-10-CM

## 2020-07-13 DIAGNOSIS — E66.9 OBESITY, UNSPECIFIED: ICD-10-CM

## 2020-07-13 DIAGNOSIS — Z86.011 PERSONAL HISTORY OF BENIGN NEOPLASM OF THE BRAIN: ICD-10-CM

## 2020-07-13 DIAGNOSIS — E83.42 HYPOMAGNESEMIA: ICD-10-CM

## 2020-07-13 DIAGNOSIS — G44.89 OTHER HEADACHE SYNDROME: ICD-10-CM

## 2020-07-13 DIAGNOSIS — A04.5 CAMPYLOBACTER ENTERITIS: ICD-10-CM

## 2020-07-13 DIAGNOSIS — L29.9 PRURITUS, UNSPECIFIED: ICD-10-CM

## 2020-07-13 DIAGNOSIS — Z90.710 ACQUIRED ABSENCE OF BOTH CERVIX AND UTERUS: ICD-10-CM

## 2020-07-13 DIAGNOSIS — Z11.59 ENCOUNTER FOR SCREENING FOR OTHER VIRAL DISEASES: ICD-10-CM

## 2020-07-13 DIAGNOSIS — A04.9 BACTERIAL INTESTINAL INFECTION, UNSPECIFIED: ICD-10-CM

## 2020-07-13 DIAGNOSIS — I72.8 ANEURYSM OF OTHER SPECIFIED ARTERIES: ICD-10-CM

## 2020-10-11 ENCOUNTER — TRANSCRIPTION ENCOUNTER (OUTPATIENT)
Age: 60
End: 2020-10-11

## 2021-01-04 ENCOUNTER — APPOINTMENT (OUTPATIENT)
Dept: ENDOCRINOLOGY | Facility: CLINIC | Age: 61
End: 2021-01-04
Payer: COMMERCIAL

## 2021-01-04 PROCEDURE — 99072 ADDL SUPL MATRL&STAF TM PHE: CPT

## 2021-01-04 PROCEDURE — 99214 OFFICE O/P EST MOD 30 MIN: CPT

## 2021-01-04 NOTE — ASSESSMENT
[FreeTextEntry1] : The patient has a history of hypothyroidism though currently is clinically euthyroid with no hypothyroid or hyperthyroid signs or symptoms. The patient's free T4 and free T3 were in the normal range and TSH suppressed but we have no recent labs. .Risks of subclinical hyperthyroidism (BMD,cardio etc.) and hypothyroidism ( fatigue , muscle aches, weight gain etc.)  discussed in detail and given clinical euthyroid state after discussion pt. is comfortable with current dose.\par Pt has mild transaminase elevation and was told to discuss with primary MD.\par Pituitary MRI reviewed and implications of adenoma were thoroughly discussed. options for treatment were again reviewed. Will recheck all values on the next blood work. \par Pt with son (26)  positive with COVID.\par Pt was in the hospital with Megna and pt had been bleeding. \par \par .

## 2021-04-08 ENCOUNTER — OUTPATIENT (OUTPATIENT)
Dept: OUTPATIENT SERVICES | Facility: HOSPITAL | Age: 61
LOS: 1 days | Discharge: HOME | End: 2021-04-08
Payer: COMMERCIAL

## 2021-04-08 DIAGNOSIS — R51.9 HEADACHE, UNSPECIFIED: ICD-10-CM

## 2021-04-08 DIAGNOSIS — Z98.890 OTHER SPECIFIED POSTPROCEDURAL STATES: Chronic | ICD-10-CM

## 2021-04-08 DIAGNOSIS — N91.2 AMENORRHEA, UNSPECIFIED: Chronic | ICD-10-CM

## 2021-04-08 PROCEDURE — 70546 MR ANGIOGRAPH HEAD W/O&W/DYE: CPT | Mod: 26

## 2021-04-30 ENCOUNTER — OUTPATIENT (OUTPATIENT)
Dept: OUTPATIENT SERVICES | Facility: HOSPITAL | Age: 61
LOS: 1 days | Discharge: HOME | End: 2021-04-30
Payer: COMMERCIAL

## 2021-04-30 ENCOUNTER — RESULT REVIEW (OUTPATIENT)
Age: 61
End: 2021-04-30

## 2021-04-30 DIAGNOSIS — Z98.890 OTHER SPECIFIED POSTPROCEDURAL STATES: Chronic | ICD-10-CM

## 2021-04-30 DIAGNOSIS — M50.30 OTHER CERVICAL DISC DEGENERATION, UNSPECIFIED CERVICAL REGION: ICD-10-CM

## 2021-04-30 DIAGNOSIS — N91.2 AMENORRHEA, UNSPECIFIED: Chronic | ICD-10-CM

## 2021-04-30 DIAGNOSIS — R09.89 OTHER SPECIFIED SYMPTOMS AND SIGNS INVOLVING THE CIRCULATORY AND RESPIRATORY SYSTEMS: ICD-10-CM

## 2021-04-30 PROCEDURE — 72141 MRI NECK SPINE W/O DYE: CPT | Mod: 26

## 2021-06-23 ENCOUNTER — OUTPATIENT (OUTPATIENT)
Dept: OUTPATIENT SERVICES | Facility: HOSPITAL | Age: 61
LOS: 1 days | Discharge: HOME | End: 2021-06-23
Payer: COMMERCIAL

## 2021-06-23 DIAGNOSIS — R42 DIZZINESS AND GIDDINESS: ICD-10-CM

## 2021-06-23 DIAGNOSIS — Z98.890 OTHER SPECIFIED POSTPROCEDURAL STATES: Chronic | ICD-10-CM

## 2021-06-23 DIAGNOSIS — N91.2 AMENORRHEA, UNSPECIFIED: Chronic | ICD-10-CM

## 2021-06-23 DIAGNOSIS — R06.00 DYSPNEA, UNSPECIFIED: ICD-10-CM

## 2021-06-23 DIAGNOSIS — R51.9 HEADACHE, UNSPECIFIED: ICD-10-CM

## 2021-06-23 PROCEDURE — 70553 MRI BRAIN STEM W/O & W/DYE: CPT | Mod: 26

## 2021-07-08 ENCOUNTER — APPOINTMENT (OUTPATIENT)
Dept: ENDOCRINOLOGY | Facility: CLINIC | Age: 61
End: 2021-07-08
Payer: COMMERCIAL

## 2021-07-08 VITALS
DIASTOLIC BLOOD PRESSURE: 80 MMHG | HEART RATE: 84 BPM | HEIGHT: 71 IN | OXYGEN SATURATION: 97 % | SYSTOLIC BLOOD PRESSURE: 126 MMHG | TEMPERATURE: 97.6 F

## 2021-07-08 PROCEDURE — 99212 OFFICE O/P EST SF 10 MIN: CPT

## 2021-07-08 PROCEDURE — 99072 ADDL SUPL MATRL&STAF TM PHE: CPT

## 2021-07-08 NOTE — PHYSICAL EXAM
[Alert] : alert [Well Nourished] : well nourished [No Acute Distress] : no acute distress [Well Developed] : well developed [Normal Sclera/Conjunctiva] : normal sclera/conjunctiva [EOMI] : extra ocular movement intact [No Proptosis] : no proptosis [Normal Oropharynx] : the oropharynx was normal [Thyroid Not Enlarged] : the thyroid was not enlarged [No Thyroid Nodules] : no palpable thyroid nodules [No Respiratory Distress] : no respiratory distress [No Accessory Muscle Use] : no accessory muscle use [Clear to Auscultation] : lungs were clear to auscultation bilaterally [Normal S1, S2] : normal S1 and S2 [Normal Rate] : heart rate was normal [Regular Rhythm] : with a regular rhythm [No Edema] : no peripheral edema [Pedal Pulses Normal] : the pedal pulses are present [Normal Bowel Sounds] : normal bowel sounds [Not Tender] : non-tender [Not Distended] : not distended [Soft] : abdomen soft [Normal Anterior Cervical Nodes] : no anterior cervical lymphadenopathy [No Spinal Tenderness] : no spinal tenderness [Spine Straight] : spine straight [No Stigmata of Cushings Syndrome] : no stigmata of Cushings Syndrome [Normal Strength/Tone] : muscle strength and tone were normal [Normal Gait] : normal gait [No Rash] : no rash [Normal Reflexes] : deep tendon reflexes were 2+ and symmetric [No Tremors] : no tremors [Oriented x3] : oriented to person, place, and time [Acanthosis Nigricans] : no acanthosis nigricans

## 2021-07-08 NOTE — ASSESSMENT
[FreeTextEntry1] : Pt has had chronic cough and dysphagia. \par The patient has a history of hypothyroidism though currently is clinically euthyroid with no hypothyroid or hyperthyroid signs or symptoms. The patient's free T4 and free T3 were in the normal range and TSH elevated at 7.05 (1/2021 )  but is now normal at 0.51. .Risks of subclinical hyperthyroidism (BMD,cardio etc.) and hypothyroidism ( fatigue , muscle aches, weight gain etc.)  discussed in detail and given clinical euthyroid state after discussion pt. is comfortable with current dose.\par Pt had mild transaminase elevation and was told to discuss with primary MD. Values in 1/2021 and current from 7/6  were fine. \par Prolactin value reviewed. Discussed issue of hyperprolactinemia including impact on bone density and symptoms related to elevation. Current level is  7.3 from 1/2021.. \par Pituitary MRI reviewed and implications of adenoma were thoroughly discussed. options for treatment were again reviewed. Will stay with current regimen\par Pt with son (26) who was   positive with COVID.\par Pt was in the hospital with with abd pain with Megna and pt had esophagitis, gastritis and duodenitis.. \par Lipid levels were reviewed with patient and importance and function of LDL, HDL and triglycerides discussed. Methods to increase HDL (exercise, fish, beans, oat meal, legumes etc.) discussed with pt. in conjunction with measures to decrease LDL and triglycerides  including diet and exercise.LDL/HDL was 175/67. Triglycerides were 143.Current dietary regimen of treatment to continue. Risks/benefits  of statins were discussed in detail. \par Pt. has Impaired fasting glucose (100-> 104) and most   current HbA1c is 5.4. We have discussed diet/exercise and risks related to diabetes in great detail.\par Pt. has had a low Vit D which is now 27 up from 21.  The importance of normal value and need for Vit D supplements of 5000 IU daily was discussed.\par Pt had COVID vaccine (pfizer). \par .

## 2021-07-29 NOTE — DISCHARGE NOTE NURSING/CASE MANAGEMENT/SOCIAL WORK - NSCORESITESY/N_GEN_A_CORE_RD
Oncology LSW outreached PT to provide support and assess for areas of need  PT reported that she had gone to see Dr Shirley Thayer this AM to address the rash that she has had on her face for several weeks  PT reported that she was given antibiotics and ointment to address the issue  PT stated that she also asked Dr Shirley Thayer as to why was "tired all the time" to which Dr Shirley Thayer reported that PT "looked anemic" to her  PT reported that if her tiredness continued to persist, she would seek medical attention to address the issue  PT further explained that her stent has been particularly bothersome and that she had been experiencing "on and off bleeding " PT reported that she had spoken with the urology team and was told to "continue monitoring the bleeding " PT reported that she was at the point where she was going to outreach Urology in [facility] versus [facility], as she had a much better experience with Dr Giancarlo Zavaleta and it was more convenient to travel to  W provided PT with the [facility]'s contact information and encouraged PT to outreach her should she need assistance with renu  PT was appreciative and agreeable  PT proceeded to report that she had received a call from [facility] with [facility], but had not yet had a chance to outreach her  PT stated that she was hopeful that the organization would be able to provide her with some form of assistance  LSW engaged in active listening at this time and provided PT with emotional support  PT receptive to support provided and thanked [facility] for her call  No

## 2022-02-23 NOTE — CONSULT NOTE ADULT - NSHPATTENDINGPLANDISCUSS_GEN_ALL_CORE
Case Management Initial Discharge Plan  Tello Ana Laura,         Readmission Risk              Risk of Unplanned Readmission:  0             Met with:family member patient and mother to discuss discharge plans. Information verified: address, contacts, phone number, , insurance Yes  PCP: BOBBY Bell  Date of last visit: [address] Provider: Fronie Lanes    Discharge Planning  Current Residence:     Living Arrangements:      Home has one  stories/2 stairs to climb  Support Systems:     Current Services PTA:    Supplier: [address]  Patient able to perform ADL's:Assisted  DME used to aid ambulation prior to admission: walker/wheelchair/during admissionTBD    Potential Assistance Needed:       Pharmacy: [facility]   Potential Assistance Purchasing Medications:     Does patient want to participate in local refill/ meds to beds program?       Patient agreeable to home care: No  Terry of choice provided:  n/a      Type of Home Care Services:     Patient expects to be discharged to:       Prior SNF/Rehab Placement and Facility: No  Agreeable to SNF/Rehab: Yes  Terry of choice provided: yes   Evaluation: yes    Expected Discharge date: Follow Up Appointment: Best Day/ Time:      Transportation provider: [facility]  Transportation arrangements needed for discharge: Yes    Discharge Plan: Spoke with parents regarding dc plans. Patient lives with parents. He is current with [address] He has walker,wheelchair, hospital bed, commode, shower seat. He has had falls at home. He fell today and they called 911 for assistance. Phone call to [facility]. Patient will have an appointment there 3/2/22 at 11 AM. Family would like SNF near [address] [place] [address]. He is Fronie Lanes. Referrals sent to several facilities.  [address]        Electronically signed by MIGUEL Johnson on 22 at 1:26 PM EST care team

## 2022-03-18 ENCOUNTER — OUTPATIENT (OUTPATIENT)
Dept: OUTPATIENT SERVICES | Facility: HOSPITAL | Age: 62
LOS: 1 days | Discharge: HOME | End: 2022-03-18
Payer: COMMERCIAL

## 2022-03-18 DIAGNOSIS — R51.9 HEADACHE, UNSPECIFIED: ICD-10-CM

## 2022-03-18 DIAGNOSIS — Z98.890 OTHER SPECIFIED POSTPROCEDURAL STATES: Chronic | ICD-10-CM

## 2022-03-18 DIAGNOSIS — N91.2 AMENORRHEA, UNSPECIFIED: Chronic | ICD-10-CM

## 2022-03-18 PROCEDURE — 70551 MRI BRAIN STEM W/O DYE: CPT | Mod: 26

## 2022-06-20 ENCOUNTER — APPOINTMENT (OUTPATIENT)
Dept: ENDOCRINOLOGY | Facility: CLINIC | Age: 62
End: 2022-06-20
Payer: COMMERCIAL

## 2022-06-20 PROCEDURE — 99443: CPT

## 2022-06-29 ENCOUNTER — EMERGENCY (EMERGENCY)
Facility: HOSPITAL | Age: 62
LOS: 0 days | Discharge: HOME | End: 2022-06-29
Attending: EMERGENCY MEDICINE | Admitting: EMERGENCY MEDICINE

## 2022-06-29 VITALS
RESPIRATION RATE: 17 BRPM | TEMPERATURE: 98 F | HEART RATE: 93 BPM | OXYGEN SATURATION: 98 % | SYSTOLIC BLOOD PRESSURE: 116 MMHG | HEIGHT: 68 IN | WEIGHT: 235.01 LBS | DIASTOLIC BLOOD PRESSURE: 75 MMHG

## 2022-06-29 DIAGNOSIS — Z98.890 OTHER SPECIFIED POSTPROCEDURAL STATES: Chronic | ICD-10-CM

## 2022-06-29 DIAGNOSIS — Z85.51 PERSONAL HISTORY OF MALIGNANT NEOPLASM OF BLADDER: ICD-10-CM

## 2022-06-29 DIAGNOSIS — R10.30 LOWER ABDOMINAL PAIN, UNSPECIFIED: ICD-10-CM

## 2022-06-29 DIAGNOSIS — Z85.841 PERSONAL HISTORY OF MALIGNANT NEOPLASM OF BRAIN: ICD-10-CM

## 2022-06-29 DIAGNOSIS — Z90.710 ACQUIRED ABSENCE OF BOTH CERVIX AND UTERUS: ICD-10-CM

## 2022-06-29 DIAGNOSIS — Z88.1 ALLERGY STATUS TO OTHER ANTIBIOTIC AGENTS STATUS: ICD-10-CM

## 2022-06-29 DIAGNOSIS — Z20.822 CONTACT WITH AND (SUSPECTED) EXPOSURE TO COVID-19: ICD-10-CM

## 2022-06-29 DIAGNOSIS — E03.9 HYPOTHYROIDISM, UNSPECIFIED: ICD-10-CM

## 2022-06-29 DIAGNOSIS — K57.92 DIVERTICULITIS OF INTESTINE, PART UNSPECIFIED, WITHOUT PERFORATION OR ABSCESS WITHOUT BLEEDING: ICD-10-CM

## 2022-06-29 DIAGNOSIS — N91.2 AMENORRHEA, UNSPECIFIED: Chronic | ICD-10-CM

## 2022-06-29 DIAGNOSIS — Z85.42 PERSONAL HISTORY OF MALIGNANT NEOPLASM OF OTHER PARTS OF UTERUS: ICD-10-CM

## 2022-06-29 LAB
ALBUMIN SERPL ELPH-MCNC: 4.4 G/DL — SIGNIFICANT CHANGE UP (ref 3.5–5.2)
ALP SERPL-CCNC: 88 U/L — SIGNIFICANT CHANGE UP (ref 30–115)
ALT FLD-CCNC: 11 U/L — SIGNIFICANT CHANGE UP (ref 0–41)
ANION GAP SERPL CALC-SCNC: 9 MMOL/L — SIGNIFICANT CHANGE UP (ref 7–14)
APTT BLD: 27.4 SEC — SIGNIFICANT CHANGE UP (ref 27–39.2)
AST SERPL-CCNC: 15 U/L — SIGNIFICANT CHANGE UP (ref 0–41)
BASOPHILS # BLD AUTO: 0.05 K/UL — SIGNIFICANT CHANGE UP (ref 0–0.2)
BASOPHILS NFR BLD AUTO: 0.6 % — SIGNIFICANT CHANGE UP (ref 0–1)
BILIRUB SERPL-MCNC: 0.4 MG/DL — SIGNIFICANT CHANGE UP (ref 0.2–1.2)
BLD GP AB SCN SERPL QL: SIGNIFICANT CHANGE UP
BUN SERPL-MCNC: 19 MG/DL — SIGNIFICANT CHANGE UP (ref 10–20)
CALCIUM SERPL-MCNC: 9.4 MG/DL — SIGNIFICANT CHANGE UP (ref 8.5–10.1)
CHLORIDE SERPL-SCNC: 103 MMOL/L — SIGNIFICANT CHANGE UP (ref 98–110)
CO2 SERPL-SCNC: 27 MMOL/L — SIGNIFICANT CHANGE UP (ref 17–32)
CREAT SERPL-MCNC: 1 MG/DL — SIGNIFICANT CHANGE UP (ref 0.7–1.5)
EGFR: 64 ML/MIN/1.73M2 — SIGNIFICANT CHANGE UP
EOSINOPHIL # BLD AUTO: 0.15 K/UL — SIGNIFICANT CHANGE UP (ref 0–0.7)
EOSINOPHIL NFR BLD AUTO: 1.9 % — SIGNIFICANT CHANGE UP (ref 0–8)
GLUCOSE SERPL-MCNC: 137 MG/DL — HIGH (ref 70–99)
HCT VFR BLD CALC: 40.7 % — SIGNIFICANT CHANGE UP (ref 37–47)
HGB BLD-MCNC: 13.1 G/DL — SIGNIFICANT CHANGE UP (ref 12–16)
IMM GRANULOCYTES NFR BLD AUTO: 0.3 % — SIGNIFICANT CHANGE UP (ref 0.1–0.3)
INR BLD: 1.03 RATIO — SIGNIFICANT CHANGE UP (ref 0.65–1.3)
LIDOCAIN IGE QN: 24 U/L — SIGNIFICANT CHANGE UP (ref 7–60)
LYMPHOCYTES # BLD AUTO: 1.25 K/UL — SIGNIFICANT CHANGE UP (ref 1.2–3.4)
LYMPHOCYTES # BLD AUTO: 15.9 % — LOW (ref 20.5–51.1)
MCHC RBC-ENTMCNC: 30 PG — SIGNIFICANT CHANGE UP (ref 27–31)
MCHC RBC-ENTMCNC: 32.2 G/DL — SIGNIFICANT CHANGE UP (ref 32–37)
MCV RBC AUTO: 93.3 FL — SIGNIFICANT CHANGE UP (ref 81–99)
MONOCYTES # BLD AUTO: 0.65 K/UL — HIGH (ref 0.1–0.6)
MONOCYTES NFR BLD AUTO: 8.3 % — SIGNIFICANT CHANGE UP (ref 1.7–9.3)
NEUTROPHILS # BLD AUTO: 5.74 K/UL — SIGNIFICANT CHANGE UP (ref 1.4–6.5)
NEUTROPHILS NFR BLD AUTO: 73 % — SIGNIFICANT CHANGE UP (ref 42.2–75.2)
NRBC # BLD: 0 /100 WBCS — SIGNIFICANT CHANGE UP (ref 0–0)
PLATELET # BLD AUTO: 230 K/UL — SIGNIFICANT CHANGE UP (ref 130–400)
POTASSIUM SERPL-MCNC: 3.8 MMOL/L — SIGNIFICANT CHANGE UP (ref 3.5–5)
POTASSIUM SERPL-SCNC: 3.8 MMOL/L — SIGNIFICANT CHANGE UP (ref 3.5–5)
PROT SERPL-MCNC: 6.8 G/DL — SIGNIFICANT CHANGE UP (ref 6–8)
PROTHROM AB SERPL-ACNC: 11.8 SEC — SIGNIFICANT CHANGE UP (ref 9.95–12.87)
RBC # BLD: 4.36 M/UL — SIGNIFICANT CHANGE UP (ref 4.2–5.4)
RBC # FLD: 13.2 % — SIGNIFICANT CHANGE UP (ref 11.5–14.5)
SARS-COV-2 RNA SPEC QL NAA+PROBE: SIGNIFICANT CHANGE UP
SODIUM SERPL-SCNC: 139 MMOL/L — SIGNIFICANT CHANGE UP (ref 135–146)
WBC # BLD: 7.86 K/UL — SIGNIFICANT CHANGE UP (ref 4.8–10.8)
WBC # FLD AUTO: 7.86 K/UL — SIGNIFICANT CHANGE UP (ref 4.8–10.8)

## 2022-06-29 PROCEDURE — 99285 EMERGENCY DEPT VISIT HI MDM: CPT

## 2022-06-29 PROCEDURE — 74177 CT ABD & PELVIS W/CONTRAST: CPT | Mod: 26,MA

## 2022-06-29 RX ORDER — ESTROGENS, CONJUGATED 0.625 MG
0 TABLET ORAL
Qty: 0 | Refills: 0 | DISCHARGE

## 2022-06-29 RX ORDER — IBUPROFEN 200 MG
600 TABLET ORAL ONCE
Refills: 0 | Status: COMPLETED | OUTPATIENT
Start: 2022-06-29 | End: 2022-06-29

## 2022-06-29 RX ORDER — FAMOTIDINE 10 MG/ML
0 INJECTION INTRAVENOUS
Qty: 0 | Refills: 0 | DISCHARGE

## 2022-06-29 RX ORDER — OMEPRAZOLE 10 MG/1
1 CAPSULE, DELAYED RELEASE ORAL
Qty: 0 | Refills: 0 | DISCHARGE

## 2022-06-29 RX ADMIN — Medication 600 MILLIGRAM(S): at 18:06

## 2022-06-29 RX ADMIN — Medication 1 TABLET(S): at 17:57

## 2022-06-29 NOTE — ED PROVIDER NOTE - CLINICAL SUMMARY MEDICAL DECISION MAKING FREE TEXT BOX
61yF p/w abd pain and bloody stools.  Pt well appearing and abd soft/benign despite tenderness.  Labs reassuring. CT c/w uncomplicated diverticulitis.  Pt tolerating PO and amenable to o/p f/u after course of PO abx and liquid diet (advance as tolerated).  Pt to f/u with her GI after sx resolve for her yearly colonoscopy.

## 2022-06-29 NOTE — ED PROVIDER NOTE - OBJECTIVE STATEMENT
This is a 61-year-old female who presents to the emergency department for evaluation of crampy abdominal pain going on for about 1 week.  Patient has a history of the symptoms in the past.  Patient states at that time she was diagnosed with a pancolitis.  Patient denies any fever and/or chills.  Patient does admit that she had 2 episodes of formed stool with streaks of blood mixed in.  Patient denies any weakness or chest pain.

## 2022-06-29 NOTE — ED PROVIDER NOTE - NS ED ROS FT
Review of Systems:  	•	CONSTITUTIONAL - no fever, no diaphoresis, no chills  	•	SKIN - no rash  	•	HEMATOLOGIC - no bleeding, no bruising  	  	•	RESPIRATORY - no shortness of breath, no cough  	•	CARDIAC - no chest pain, no palpitations  	•	GI - abd pain, no nausea, no vomiting, no diarrhea, no constipation  	•	GENITO-URINARY - no discharge, no dysuria; no hematuria, no increased urinary frequency  	•	MUSCULOSKELETAL - no joint paint, no swelling, no redness  	•	NEUROLOGIC - no weakness, no headache, no paresthesias, no LOC  	•	PSYCH - no anxiety, non suicidal, non homicidal, no hallucination, no depression

## 2022-06-29 NOTE — ED PROVIDER NOTE - PROGRESS NOTE DETAILS
Results discussed with patient.  Patient prescribed antibiotics.  Patient should follow-up with GI.  Vernon GALLARDO

## 2022-06-29 NOTE — ED PROVIDER NOTE - PHYSICAL EXAMINATION
--EXAM--  VITAL SIGNS: I have reviewed vs documented at present.  CONSTITUTIONAL: Well-developed; well-nourished; in no acute distress.   SKIN: Warm and dry, no acute rash.   HEAD: Normocephalic; atraumatic.  EYES: PERRL, EOM intact; conjunctiva and sclera clear. No nystagmus.  ENT: No nasal discharge; airway clear.  NECK: Supple; non tender.  CARD: S1, S2, Regular rate and rhythm.   RESP: No wheezes, rales or rhonchi.  ABD: Normal bowel sounds; soft; non-distended; tender llq    riate.

## 2022-06-29 NOTE — ED PROVIDER NOTE - PATIENT PORTAL LINK FT
You can access the FollowMyHealth Patient Portal offered by [facility] by registering at the following website: [email or web address]. By joining [employer]’s FollowMyHealth portal, you will also be able to view your health information using other applications (apps) compatible with our system.

## 2022-06-29 NOTE — ED PROVIDER NOTE - NSFOLLOWUPINSTRUCTIONS_ED_ALL_ED_FT
Please take the full 10 days of antibiotics as prescribed, even if you feel better sooner.  You should start with a liquid diet until your pain improves, then advance slowly.  Once you feel better, follow up with your GI dr for another colonoscopy.    Diverticulitis    Diverticulitis is inflammation or infection of small pouches in your colon that form when you HAVE a condition called diverticulosis. This condition can range from mild to severe potentially leading to perforation or obstructions of your colon. Symptoms include abdominal pain, fever/chills, nausea, vomiting, diarrhea, constipation, or blood in your stool. If you were prescribed an antibiotic medicine, take it as told by your health care provider. Do not stop taking the antibiotic even if you start to feel better.    SEEK IMMEDIATE MEDICAL CARE IF YOU HAVE ANY OF THE FOLLOWING SYMPTOMS: worsening abdominal pain, high fever, inability to hold down liquids or medication, black or bloody stools, inability to pass gas, lightheadedness/dizziness, or a change in mental status.

## 2022-06-29 NOTE — ED ADULT NURSE NOTE - NSFALLRSKUNASSIST_ED_ALL_ED
[facility] [place]  [address] [place], [address] ~ [phone]                Infant Custody Release   6/19/2019    Girl Dax Redman           Admission Information     Date & Time  6/19/2019 Provider  Liz Rich MD Depart
no

## 2022-06-29 NOTE — ED PROVIDER NOTE - ATTENDING APP SHARED VISIT CONTRIBUTION OF CARE
61yF p/w 1wk of crampy abd pain and intermittent bloody stools reminiscent to her of prior episodes of food poisoning and/or pancolitis (most recently last year).  No fever.

## 2022-07-15 ENCOUNTER — EMERGENCY (EMERGENCY)
Facility: HOSPITAL | Age: 62
LOS: 0 days | Discharge: HOME | End: 2022-07-15
Attending: STUDENT IN AN ORGANIZED HEALTH CARE EDUCATION/TRAINING PROGRAM | Admitting: STUDENT IN AN ORGANIZED HEALTH CARE EDUCATION/TRAINING PROGRAM

## 2022-07-15 VITALS
HEART RATE: 70 BPM | SYSTOLIC BLOOD PRESSURE: 151 MMHG | WEIGHT: 220.02 LBS | TEMPERATURE: 97 F | HEIGHT: 68 IN | DIASTOLIC BLOOD PRESSURE: 83 MMHG | OXYGEN SATURATION: 99 % | RESPIRATION RATE: 18 BRPM

## 2022-07-15 DIAGNOSIS — Z86.79 PERSONAL HISTORY OF OTHER DISEASES OF THE CIRCULATORY SYSTEM: ICD-10-CM
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
## 2022-07-15 DIAGNOSIS — N91.2 AMENORRHEA, UNSPECIFIED: Chronic | ICD-10-CM

## 2022-07-15 DIAGNOSIS — Z90.710 ACQUIRED ABSENCE OF BOTH CERVIX AND UTERUS: ICD-10-CM

## 2022-07-15 DIAGNOSIS — S61.012A LACERATION WITHOUT FOREIGN BODY OF LEFT THUMB WITHOUT DAMAGE TO NAIL, INITIAL ENCOUNTER: ICD-10-CM

## 2022-07-15 DIAGNOSIS — Z85.841 PERSONAL HISTORY OF MALIGNANT NEOPLASM OF BRAIN: ICD-10-CM

## 2022-07-15 DIAGNOSIS — Z98.890 OTHER SPECIFIED POSTPROCEDURAL STATES: Chronic | ICD-10-CM

## 2022-07-15 DIAGNOSIS — Z88.1 ALLERGY STATUS TO OTHER ANTIBIOTIC AGENTS STATUS: ICD-10-CM

## 2022-07-15 DIAGNOSIS — Z23 ENCOUNTER FOR IMMUNIZATION: ICD-10-CM

## 2022-07-15 DIAGNOSIS — E03.9 HYPOTHYROIDISM, UNSPECIFIED: ICD-10-CM

## 2022-07-15 DIAGNOSIS — Z85.42 PERSONAL HISTORY OF MALIGNANT NEOPLASM OF OTHER PARTS OF UTERUS: ICD-10-CM
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
## 2022-07-15 PROCEDURE — 12001 RPR S/N/AX/GEN/TRNK 2.5CM/<: CPT

## 2022-07-15 PROCEDURE — 99283 EMERGENCY DEPT VISIT LOW MDM: CPT | Mod: 25

## 2022-07-15 PROCEDURE — 73120 X-RAY EXAM OF HAND: CPT | Mod: 26,LT

## 2022-07-15 RX ORDER — TETANUS TOXOID, REDUCED DIPHTHERIA TOXOID AND ACELLULAR PERTUSSIS VACCINE, ADSORBED 5; 2.5; 8; 8; 2.5 [IU]/.5ML; [IU]/.5ML; UG/.5ML; UG/.5ML; UG/.5ML
0.5 SUSPENSION INTRAMUSCULAR ONCE
Refills: 0 | Status: COMPLETED | OUTPATIENT
Start: 2022-07-15 | End: 2022-07-15

## 2022-07-15 RX ADMIN — TETANUS TOXOID, REDUCED DIPHTHERIA TOXOID AND ACELLULAR PERTUSSIS VACCINE, ADSORBED 0.5 MILLILITER(S): 5; 2.5; 8; 8; 2.5 SUSPENSION INTRAMUSCULAR at 21:00

## 2022-07-15 NOTE — ED PROVIDER NOTE - NSFOLLOWUPINSTRUCTIONS_ED_ALL_ED_FT
Sutures removed in 2 weeks       Laceration    A laceration is a cut that goes through all of the layers of the skin and into the tissue that is right under the skin. Some lacerations heal on their own. Others need to be closed with skin adhesive strips, skin glue, stitches (sutures), or staples. Proper laceration care minimizes the risk of infection and helps the laceration to heal better.  If non-absorbable stitches or staples have been placed, they must be taken out within the time frame instructed by your healthcare provider.    SEEK IMMEDIATE MEDICAL CARE IF YOU HAVE ANY OF THE FOLLOWING SYMPTOMS: swelling around the wound, worsening pain, drainage from the wound, red streaking going away from your wound, inability to move finger or toe near the laceration, or discoloration of skin near the laceration.

## 2022-07-15 NOTE — ED PROVIDER NOTE - ATTENDING APP SHARED VISIT CONTRIBUTION OF CARE
61-year-old female past medical history stated in the chart presents with finger laceration.  Patient was hit by a fence pole that snapped backward onto her finger.  Complaining of left thumb laceration.  No numbness/tingling, no decreased range of motion, no swelling, no other trauma.    CONSTITUTIONAL: Well-developed; well-nourished; in no acute distress. Sitting up and providing appropriate history and physical examination  SKIN: + Laceration to left thumb.  Otherwise skin exam is warm and dry, no acute rash.  HEAD: Normocephalic; atraumatic.  EYES: PERRL, 3 mm bilateral, no nystagmus, EOM intact; conjunctiva and sclera clear.  ENT: No nasal discharge; airway clear.  NECK: Supple; non tender. + full passive ROM in all directions. No JVD  EXT: Normal ROM. No clubbing, cyanosis or edema. Dp and Pt Pulses intact. Cap refill less than 3 seconds  NEURO: CN 2-12 intact, normal finger to nose, normal romberg, stable gait, no sensory or motor deficits, Alert, oriented, grossly unremarkable. No Focal deficits. GCS 15. NIH 0  PSYCH: Cooperative, appropriate.

## 2022-07-15 NOTE — ED PROVIDER NOTE - IV ALTEPLASE EXCL REL HIDDEN
Kiki Rock MD: Right linear opacity on CXR possible pna. Will start on vancomycin. Tried to contact PMD on phone with no return call. Will admit to hospitalist. CT head and neck nml. show

## 2022-07-15 NOTE — ED PROVIDER NOTE - CLINICAL SUMMARY MEDICAL DECISION MAKING FREE TEXT BOX
I personally evaluated the patient. I reviewed the Resident´s or Physician Assistant´s note (as assigned above), and agree with the findings and plan except as documented in my note.  Patient evaluated for thumb laceration.  X-ray performed in the ED.  Patient's tetanus updated, laceration repaired.  Instructed to return for suture removal.  I have fully discussed the medical management and delivery of care with the patient. I have discussed any available labs, imaging and treatment options with the patient. Patient confirms understanding and has been given detailed return precautions. Patient instructed to return to the ED should symptoms persist or worsen. Patient has demonstrated capacity and has verbalized understanding. Patient is well appearing upon discharge.

## 2022-07-15 NOTE — ED PROVIDER NOTE - PATIENT PORTAL LINK FT
You can access the FollowMyHealth Patient Portal offered by [facility] by registering at the following website: [email or web address]. By joining [employer]’s FollowMyHealth portal, you will also be able to view your health information using other applications (apps) compatible with our system.

## 2022-07-15 NOTE — ED PROVIDER NOTE - OBJECTIVE STATEMENT
Patient is calling due to was seen recently and was told that all of his prescriptions would be sent to [facility] and only one of his prescriptions was sent and is out of Lexapro and that was one of his prescriptions that was not sent.  The atorvastatin was the only one sent to [facility].   Patient said he was told these would all be sent to [facility] when he was seen.  Patient wants a call back when the prescription is sent into [facility].    Pharmacy : yes  
Refills requested for Escitalopram 10 mg and Montelukast 10 mg. Last office visit was 2/5/18 for a physical. Previous medications were sent to [facility]. Patient now needs Rx's sent to [facility].     Rx's sent in.     Patient aware Rx's have been sent in.   
Patient c/o left thumb laceration, Hit by fence pole that snapped back.  no numbness,

## 2022-08-22 ENCOUNTER — APPOINTMENT (OUTPATIENT)
Dept: ORTHOPEDIC SURGERY | Facility: CLINIC | Age: 62
End: 2022-08-22

## 2022-08-22 PROCEDURE — 99213 OFFICE O/P EST LOW 20 MIN: CPT

## 2022-08-22 NOTE — REASON FOR VISIT
[FreeTextEntry2] : Patient is here for follow up of back pain and bilateral knee pain\par Right foot pain is still present she did see Dr. Rodriguez who aspirated the cyst from her finger feeling better

## 2022-08-22 NOTE — ASSESSMENT
[FreeTextEntry1] :  no intervention this time did not need her medication refilled work on weight reduction I will see her in a few months

## 2022-08-22 NOTE — HISTORY OF PRESENT ILLNESS
[de-identified] : Patient Complaint - Low back pain and bilateral knee pain R>L\par Having some difficulty with persistent cough diagnosed with Johnson's\par she did see pain management had a lumbar injection not ready to have another no change in weight pain still down right\par leg having difficulty with persistent cough \par The back pain is been tolerable still some pain in the knees some swelling on the plantar aspect of the right foot was\par told it may be a cyst did get MRI of foot \par had covid in december\par also has some pain in the hands swelling and a cyst on the dorsum of the left middle finger\par History of Present Illness\par Patient has had several falls one on her low back into her knees since last exam she did have he EMGs with noted L4-5\par radiculopathy right more than left these were done 05/14/2019. She has continues Percocet it did not need refill today\par she had dried describes that the pain will travel more down to the right foot weight is elevated but stable she did have\par lumbar spine fusion 2004 by Dr. Ravindra Castillo\par Since her visit she was seen pain considering some she some medical issues with GI bleeding which she lost she still\par ongoing pain in the intend making a follow-up visit pain

## 2022-11-07 NOTE — PROVIDER CONTACT NOTE (OTHER) - DATE AND TIME:
18-Mar-2018 17:50 This was a shared visit with the DEONTE. I reviewed and verified the documentation and independently performed the documented:

## 2022-11-22 ENCOUNTER — APPOINTMENT (OUTPATIENT)
Dept: ORTHOPEDIC SURGERY | Facility: CLINIC | Age: 62
End: 2022-11-22

## 2022-11-22 DIAGNOSIS — M51.9 UNSPECIFIED THORACIC, THORACOLUMBAR AND LUMBOSACRAL INTERVERTEBRAL DISC DISORDER: ICD-10-CM

## 2022-11-22 DIAGNOSIS — R20.0 ANESTHESIA OF SKIN: ICD-10-CM

## 2022-11-22 PROCEDURE — 99213 OFFICE O/P EST LOW 20 MIN: CPT

## 2022-11-22 NOTE — HISTORY OF PRESENT ILLNESS
[de-identified] : Patient Complaint - Low back pain and bilateral knee pain R>L\par Having some difficulty with persistent cough diagnosed with Johnson's\par she did see pain management had a lumbar injection not ready to have another no change in weight pain still down right\par leg having difficulty with persistent cough \par The back pain is been tolerable still some pain in the knees some swelling on the plantar aspect of the right foot was\par told it may be a cyst did get MRI of foot \par had covid in december\par also has some pain in the hands swelling and a cyst on the dorsum of the left middle finger\par History of Present Illness\par Patient has had several falls one on her low back into her knees since last exam she did have he EMGs with noted L4-5\par radiculopathy right more than left these were done 05/14/2019. She has continues Percocet it did not need refill today\par she had dried describes that the pain will travel more down to the right foot weight is elevated but stable she did have\par lumbar spine fusion 2004 by Dr. Ravindra Castillo\par Since her visit she was seen pain considering some she some medical issues with GI bleeding which she lost she still\par ongoing pain in the intend making a follow-up visit pain

## 2022-11-22 NOTE — IMAGING
[de-identified] :  overweight still some spasm back limits in motion both knees have some swelling and crepitus limits in flexion pain on the instep of the right foot

## 2022-11-22 NOTE — REASON FOR VISIT
[FreeTextEntry2] : follow up for neck, back knee and right foot pain\par  lost 10 lb has some neck stiffness numbness in  her hands or fingers knee has some shooting pain R>L

## 2022-12-15 ENCOUNTER — APPOINTMENT (OUTPATIENT)
Dept: ENDOCRINOLOGY | Facility: CLINIC | Age: 62
End: 2022-12-15

## 2022-12-15 VITALS
TEMPERATURE: 97.5 F | SYSTOLIC BLOOD PRESSURE: 124 MMHG | OXYGEN SATURATION: 97 % | HEART RATE: 74 BPM | HEIGHT: 71 IN | DIASTOLIC BLOOD PRESSURE: 78 MMHG

## 2022-12-15 PROCEDURE — 99213 OFFICE O/P EST LOW 20 MIN: CPT

## 2022-12-15 NOTE — ASSESSMENT
[FreeTextEntry1] : Pt has had chronic cough and dysphagia. \par The patient has a history of hypothyroidism though currently is clinically euthyroid with no hypothyroid or hyperthyroid signs or symptoms. The patient's free T4 and free T3 were in the normal range and TSH elevated at 5.15. .Risks of subclinical hyperthyroidism (BMD,cardio etc.) and hypothyroidism ( fatigue , muscle aches, weight gain etc.)  discussed in detail and given clinical euthyroid state after discussion pt. is comfortable with current dose.\par Pt had h/o  mild transaminase elevation and was told to discuss with primary MD. Values currently  from 12/8/22 were fine. \par Prolactin value reviewed. Discussed issue of hyperprolactinemia including impact on bone density and symptoms related to elevation. Current level is 10.4 from   7.1 \par Pituitary MRI reviewed and implications of adenoma were thoroughly discussed. options for treatment were again reviewed. Will stay with current regimen\par Pt was in the hospital with with abd pain with Megna and pt had esophagitis, gastritis and duodenitis.. \par Lipid levels were reviewed with patient and importance and function of LDL, HDL and triglycerides discussed. Methods to increase HDL (exercise, fish, beans, oat meal, legumes etc.) discussed with pt. in conjunction with measures to decrease LDL and triglycerides including diet and exercise.LDL/HDL was 116/66 from 153/60  . Triglycerides were 111, current regimen of treatment with atorvastatin 10 mg t to continue. Risks/benefits  of statins were discussed in detail. \par Pt. has Impaired fasting glucose (100 ) and current HbA1c is 5.4. We have discussed diet/exercise and risks related to diabetes in great detail.\par Pt. has had a low Vit D which is now 28 from 27.The importance of normal value and need for Vit D supplements of 5000 IU daily was discussed.\par \par .

## 2023-02-22 ENCOUNTER — APPOINTMENT (OUTPATIENT)
Dept: ORTHOPEDIC SURGERY | Facility: CLINIC | Age: 63
End: 2023-02-22

## 2023-04-06 PROBLEM — Z86.018 HISTORY OF BENIGN SPINAL CORD TUMOR: Status: ACTIVE | Noted: 2023-04-06

## 2023-04-06 PROBLEM — Z83.438 FAMILY HISTORY OF HYPERLIPIDEMIA: Status: ACTIVE | Noted: 2023-04-06

## 2023-04-06 PROBLEM — Z87.898 HISTORY OF CHEST PAIN: Status: RESOLVED | Noted: 2023-04-06 | Resolved: 2023-04-06

## 2023-04-06 PROBLEM — Z82.49 FAMILY HISTORY OF CORONARY ARTERY DISEASE: Status: ACTIVE | Noted: 2023-04-06

## 2023-04-06 PROBLEM — Z85.42 HISTORY OF MALIGNANT NEOPLASM OF UTERUS: Status: RESOLVED | Noted: 2023-04-06 | Resolved: 2023-04-06

## 2023-04-07 ENCOUNTER — APPOINTMENT (OUTPATIENT)
Dept: CARDIOLOGY | Facility: CLINIC | Age: 63
End: 2023-04-07
Payer: COMMERCIAL

## 2023-04-07 ENCOUNTER — NON-APPOINTMENT (OUTPATIENT)
Age: 63
End: 2023-04-07

## 2023-04-07 VITALS — DIASTOLIC BLOOD PRESSURE: 82 MMHG | HEIGHT: 71 IN | SYSTOLIC BLOOD PRESSURE: 132 MMHG

## 2023-04-07 DIAGNOSIS — Z87.898 PERSONAL HISTORY OF OTHER SPECIFIED CONDITIONS: ICD-10-CM

## 2023-04-07 DIAGNOSIS — Z82.49 FAMILY HISTORY OF ISCHEMIC HEART DISEASE AND OTHER DISEASES OF THE CIRCULATORY SYSTEM: ICD-10-CM

## 2023-04-07 DIAGNOSIS — R06.09 OTHER FORMS OF DYSPNEA: ICD-10-CM

## 2023-04-07 DIAGNOSIS — Z83.438 FAMILY HISTORY OF OTHER DISORDER OF LIPOPROTEIN METABOLISM AND OTHER LIPIDEMIA: ICD-10-CM

## 2023-04-07 DIAGNOSIS — Z86.018 PERSONAL HISTORY OF OTHER BENIGN NEOPLASM: ICD-10-CM

## 2023-04-07 DIAGNOSIS — Z85.42 PERSONAL HISTORY OF MALIGNANT NEOPLASM OF OTHER PARTS OF UTERUS: ICD-10-CM

## 2023-04-07 PROCEDURE — 99204 OFFICE O/P NEW MOD 45 MIN: CPT | Mod: 25

## 2023-04-07 PROCEDURE — 93000 ELECTROCARDIOGRAM COMPLETE: CPT

## 2023-04-07 PROCEDURE — 93242 EXT ECG>48HR<7D RECORDING: CPT

## 2023-04-07 NOTE — DISCUSSION/SUMMARY
[FreeTextEntry1] : Pt not here in 7 yrs.2 wk a ago pt had NUNEZ and palpitations.Pt is hypothyroid.\par 63 yo hx HTN HLD Brain Tumor.hypothyroid. She last week saw PMD Dr avelar found in afib. She denies chest pain or sob. She gets NUNEZ. She can have seizures at night.  She walks 90 minutes 4/week.\par MIRIAN vasc 2. She needs a stress echo. Will Holter.\par Reviewed option cardioversion ablation. Also because seizure option watchman. Patient understands .Aware risk benefits AC. To See neuro.

## 2023-04-07 NOTE — REVIEW OF SYSTEMS
[Tinnitus] : tinnitus [Palpitations] : palpitations [Joint Pain] : joint pain [Negative] : Heme/Lymph [Fever] : no fever [Chills] : no chills [Blurry Vision] : no blurred vision [SOB] : no shortness of breath [Chest Discomfort] : no chest discomfort [Cough] : no cough [Wheezing] : no wheezing [Abdominal Pain] : no abdominal pain [Diarrhea] : diarrhea [Dysuria] : no dysuria [Myalgia] : no myalgia [Rash] : no rash [Skin Lesions] : no skin lesions [Dizziness] : no dizziness [Weakness] : no weakness [FreeTextEntry9] : elbow wirst knee pain

## 2023-04-07 NOTE — HISTORY OF PRESENT ILLNESS
[FreeTextEntry1] : 63 yo hx HTN HLD Brain Tumor.hypothyroid. She last week saw PMD Dr avelar found in afib. She denies chest pain or sob. She gets NUNEZ. She can have seizures at night.  She walks 90 minutes 4/week.

## 2023-04-24 ENCOUNTER — APPOINTMENT (OUTPATIENT)
Dept: CARDIOLOGY | Facility: CLINIC | Age: 63
End: 2023-04-24
Payer: COMMERCIAL

## 2023-04-24 PROCEDURE — 93244 EXT ECG>48HR<7D REV&INTERPJ: CPT

## 2023-05-01 ENCOUNTER — APPOINTMENT (OUTPATIENT)
Dept: ORTHOPEDIC SURGERY | Facility: CLINIC | Age: 63
End: 2023-05-01

## 2023-06-01 ENCOUNTER — APPOINTMENT (OUTPATIENT)
Dept: CARDIOLOGY | Facility: CLINIC | Age: 63
End: 2023-06-01

## 2023-06-08 ENCOUNTER — APPOINTMENT (OUTPATIENT)
Dept: ENDOCRINOLOGY | Facility: CLINIC | Age: 63
End: 2023-06-08
Payer: COMMERCIAL

## 2023-06-08 VITALS
SYSTOLIC BLOOD PRESSURE: 124 MMHG | HEIGHT: 71 IN | HEART RATE: 76 BPM | DIASTOLIC BLOOD PRESSURE: 80 MMHG | OXYGEN SATURATION: 98 %

## 2023-06-08 PROCEDURE — 99213 OFFICE O/P EST LOW 20 MIN: CPT

## 2023-06-08 NOTE — ASSESSMENT
[FreeTextEntry1] : Labs based on 12/8/2022:\par Pt in A Fib and saw Lefkovit\par Pt has had chronic cough and dysphagia. \par The patient has a history of hypothyroidism though currently is clinically euthyroid with no hypothyroid or hyperthyroid signs or symptoms. The patient's free T4 and free T3 were in the normal range and TSH elevated at 5.15. .Risks of subclinical hyperthyroidism (BMD,cardio etc.) and hypothyroidism ( fatigue , muscle aches, weight gain etc.)  discussed in detail and given clinical euthyroid state after discussion pt. is comfortable with current dose.\par Pt had h/o  mild transaminase elevation and was told to discuss with primary MD. Values currently  from 12/8/22 were fine. \par Prolactin value reviewed. Discussed issue of hyperprolactinemia including impact on bone density and symptoms related to elevation. Current level is 10.4 from   7.1 \par Pituitary MRI reviewed and implications of adenoma were thoroughly discussed. options for treatment were again reviewed. Will stay with current regimen\par Pt was in the hospital with with abd pain with Megna and pt had esophagitis, gastritis and duodenitis.. \par Lipid levels were reviewed with patient and importance and function of LDL, HDL and triglycerides discussed. Methods to increase HDL (exercise, fish, beans, oat meal, legumes etc.) discussed with pt. in conjunction with measures to decrease LDL and triglycerides including diet and exercise.LDL/HDL was 116/66 from 153/60  . Triglycerides were 111, current regimen of treatment with atorvastatin 10 mg t to continue. Risks/benefits  of statins were discussed in detail. \par Pt. has Impaired fasting glucose (100 ) and current HbA1c is 5.4. We have discussed diet/exercise and risks related to diabetes in great detail.\par Pt. has had a low Vit D which is now 28 from 27.The importance of normal value and need for Vit D supplements of 5000 IU daily was discussed.\par \par .

## 2023-06-15 ENCOUNTER — APPOINTMENT (OUTPATIENT)
Dept: CV DIAGNOSTICS | Facility: HOSPITAL | Age: 63
End: 2023-06-15

## 2023-08-30 ENCOUNTER — EMERGENCY (EMERGENCY)
Facility: HOSPITAL | Age: 63
LOS: 0 days | Discharge: HOME | End: 2023-08-30
Attending: STUDENT IN AN ORGANIZED HEALTH CARE EDUCATION/TRAINING PROGRAM
Payer: COMMERCIAL

## 2023-08-30 VITALS
OXYGEN SATURATION: 100 % | SYSTOLIC BLOOD PRESSURE: 160 MMHG | HEART RATE: 71 BPM | RESPIRATION RATE: 16 BRPM | DIASTOLIC BLOOD PRESSURE: 88 MMHG

## 2023-08-30 VITALS
WEIGHT: 225.09 LBS | HEIGHT: 68 IN | HEART RATE: 76 BPM | DIASTOLIC BLOOD PRESSURE: 99 MMHG | RESPIRATION RATE: 18 BRPM | OXYGEN SATURATION: 99 % | SYSTOLIC BLOOD PRESSURE: 191 MMHG | TEMPERATURE: 98 F

## 2023-08-30 DIAGNOSIS — Z85.841 PERSONAL HISTORY OF MALIGNANT NEOPLASM OF BRAIN: ICD-10-CM

## 2023-08-30 DIAGNOSIS — Z98.890 OTHER SPECIFIED POSTPROCEDURAL STATES: Chronic | ICD-10-CM

## 2023-08-30 DIAGNOSIS — Z90.710 ACQUIRED ABSENCE OF BOTH CERVIX AND UTERUS: ICD-10-CM

## 2023-08-30 DIAGNOSIS — E03.9 HYPOTHYROIDISM, UNSPECIFIED: ICD-10-CM

## 2023-08-30 DIAGNOSIS — Z88.1 ALLERGY STATUS TO OTHER ANTIBIOTIC AGENTS STATUS: ICD-10-CM

## 2023-08-30 DIAGNOSIS — Z86.69 PERSONAL HISTORY OF OTHER DISEASES OF THE NERVOUS SYSTEM AND SENSE ORGANS: ICD-10-CM

## 2023-08-30 DIAGNOSIS — S80.812A ABRASION, LEFT LOWER LEG, INITIAL ENCOUNTER: ICD-10-CM

## 2023-08-30 DIAGNOSIS — Z85.42 PERSONAL HISTORY OF MALIGNANT NEOPLASM OF OTHER PARTS OF UTERUS: ICD-10-CM

## 2023-08-30 DIAGNOSIS — N91.2 AMENORRHEA, UNSPECIFIED: Chronic | ICD-10-CM

## 2023-08-30 DIAGNOSIS — Z85.41 PERSONAL HISTORY OF MALIGNANT NEOPLASM OF CERVIX UTERI: ICD-10-CM

## 2023-08-30 DIAGNOSIS — M79.671 PAIN IN RIGHT FOOT: ICD-10-CM

## 2023-08-30 DIAGNOSIS — Z79.01 LONG TERM (CURRENT) USE OF ANTICOAGULANTS: ICD-10-CM
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
## 2023-08-30 DIAGNOSIS — S80.212A ABRASION, LEFT KNEE, INITIAL ENCOUNTER: ICD-10-CM
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
## 2023-08-30 DIAGNOSIS — Z98.890 OTHER SPECIFIED POSTPROCEDURAL STATES: ICD-10-CM
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
## 2023-08-30 DIAGNOSIS — M25.571 PAIN IN RIGHT ANKLE AND JOINTS OF RIGHT FOOT: ICD-10-CM

## 2023-08-30 PROCEDURE — 99284 EMERGENCY DEPT VISIT MOD MDM: CPT | Mod: 25

## 2023-08-30 PROCEDURE — 73610 X-RAY EXAM OF ANKLE: CPT | Mod: 50

## 2023-08-30 PROCEDURE — 73564 X-RAY EXAM KNEE 4 OR MORE: CPT | Mod: 50

## 2023-08-30 PROCEDURE — 73610 X-RAY EXAM OF ANKLE: CPT | Mod: 26,50

## 2023-08-30 PROCEDURE — 99284 EMERGENCY DEPT VISIT MOD MDM: CPT

## 2023-08-30 PROCEDURE — 73630 X-RAY EXAM OF FOOT: CPT | Mod: 50

## 2023-08-30 PROCEDURE — 73564 X-RAY EXAM KNEE 4 OR MORE: CPT | Mod: 26,50

## 2023-08-30 PROCEDURE — 73630 X-RAY EXAM OF FOOT: CPT | Mod: 26,50

## 2023-08-30 RX ORDER — APIXABAN 2.5 MG/1
0 TABLET, FILM COATED ORAL
Refills: 0 | DISCHARGE

## 2023-08-30 RX ORDER — RAMIPRIL 5 MG
1 CAPSULE ORAL
Qty: 0 | Refills: 0 | DISCHARGE

## 2023-08-30 RX ORDER — ONDANSETRON 8 MG/1
0 TABLET, FILM COATED ORAL
Qty: 0 | Refills: 0 | DISCHARGE

## 2023-08-30 NOTE — ED PROVIDER NOTE - PHYSICAL EXAMINATION
CONST: Well appearing in NAD  CARD: S1 S2; No jvd  RESP: Equal BS B/L, No wheezes, rhonchi or rales. No distress  MS: Tenderness to R lateral ankle and L mid foot. Normal ROM in all extremities. pulses 2 +. no calf tenderness or swelling  SKIN: Superficial abrasion to LLE  NEURO: A&Ox3, No focal deficits. Strength 5/5 with no sensory deficits.

## 2023-08-30 NOTE — ED PROVIDER NOTE - ATTENDING APP SHARED VISIT CONTRIBUTION OF CARE
I have personally performed a history and physical exam on this patient and personally directed the management of the patient.  63-year-old female past medical history as listed presents for evaluation status post fall.  Patient was walking when she tripped and fell onto the her bilateral legs sustaining abrasion to left lower extremity.  Afterwards patient got up and went and had her nails done during that time started to develop bilateral foot, ankle and knee pain, aggravated with weightbearing, improved at rest.  Denies numbness, weakness, discoloration, head trauma, LOC.  CON: appears stated age, pleasant, no acute distress, HENMT: normocephalic, atraumatic, anicteric, no conjunctival injection,  CV: regular rhythm, distal pulses intact, RESP: no acute respiratory distress, no stridor, breathing comfortably on RA , GI:  soft, nontender, no rebound, no guarding, SKIN: abrasions on left knee, MSK: no deformities, tenderness on lateral side of right ankle, NEURO: no gross motor or sensory deficit Psychiatric: appropriate mood, appropriate affect  will send x ray and pain medication and reevaluate I have personally performed a history and physical exam on this patient and personally directed the management of the patient.  63-year-old female past medical history as listed presents for evaluation status post fall.  Patient was walking when she tripped and fell onto the her bilateral legs sustaining abrasion to left lower extremity.  Afterwards patient got up and went and had her nails done during that time started to develop bilateral foot, ankle and knee pain, aggravated with weightbearing, improved at rest.  Denies numbness, weakness, discoloration, head trauma, LOC.  CON: appears stated age, pleasant, no acute distress, HENMT: normocephalic, atraumatic, anicteric, no conjunctival injection,  CV: regular rhythm, distal pulses intact, RESP: no acute respiratory distress, no stridor, breathing comfortably on RA , GI:  soft, nontender, no rebound, no guarding, SKIN: abrasions on left knee, MSK: no deformities, tenderness on lateral side of right ankle, NEURO: no gross motor or sensory deficit Psychiatric: appropriate mood, appropriate affect  will send x ray and pain medication and reevaluate.

## 2023-08-30 NOTE — ED ADULT NURSE NOTE - NSICDXPASTMEDICALHX_GEN_ALL_CORE_FT
PAST MEDICAL HISTORY:  Brain aneurysm     Brain tumor, recurrent     Cervical cancer     Chronic atrial fibrillation     Hypothyroid     Seizures     Spine anomaly     Uterine cancer, sarcoma     Vertigo

## 2023-08-30 NOTE — ED PROVIDER NOTE - CARE PROVIDERS DIRECT ADDRESSES
[email or web address]

## 2023-08-30 NOTE — ED PROVIDER NOTE - CARE PROVIDER_API CALL
Beau Orozco  Orthopaedic Surgery  [address] [place] [address]  Phone: [phone]  Fax: [phone]  Follow Up Time:

## 2023-08-30 NOTE — ED PROVIDER NOTE - PATIENT PORTAL LINK FT
You can access the FollowMyHealth Patient Portal offered by [facility] by registering at the following website: [email or web address]. By joining [email or web address]’s FollowMyHealth portal, you will also be able to view your health information using other applications (apps) compatible with our system.

## 2023-08-30 NOTE — ED ADULT TRIAGE NOTE - TEMPERATURE IN FAHRENHEIT (DEGREES F)
[facility] Internal Medicine [address]    [address]    [address]    [facility] [address]    Phone:  [phone]    Fax:  [phone]       Thank You for choosing us for your health care visit. We are glad to serve you and happy to provide you with this summary of your visit. Please help us to ensure we have accurate records. If you find anything that needs to be changed, please let our staff know as soon as possible.          Your Demographic Information     Patient Name Sex Navin Nuñez Female 1940       Ethnic Group Patient Race    Not of  or  Origin White      Your Visit Details     Date & Time Provider Department    2017 11:00 AM Dom Ortiz MD [facility] Internal Medicine [address]      Your Upcoming Appointment*(Max 10)     Wednesday May 24, 2017  9:40 AM CDT   Follow-up Visit with Dom Ortiz MD   [facility] Internal [facility] [address] ([facility])    [address]  [facility] [address]  [facility] [address]   [phone]            2017  1:00 PM CDT   Follow-up Visit with Claire M Fritsche, MD   [facility] [address] ([facility] )    [address] [place] [id number] [phone]              Your To Do List     Future Orders Please Complete On or Around Expires    C DIFFICILE BY PCR  2017 May 26, 2017    CALPROTECTIN STOOL  2017 May 26, 2017    CBC & AUTO DIFFERENTIAL  2017 (Approximate) May 03, 2017    COMPREHENSIVE METABOLIC PANEL  2017 (Approximate) May 03, 2017    ENTERIC PATHOGEN CULTURE  2017 May 26, 2017    LIPASE LEVEL  2017 (Approximate) May 03, 2017    OVA AND PARASITE W MICROSCOPIC EXAM  2017 (Approximate) May 03, 2017    PANCREATIC ELASTASE STOOL  2017 May 26, 2017    THYROID STIMULATING HORMONE REFLEX  2017 (Approximate) May 03, 2017    WBC STOOL  Apr 26, 2017 (Approximate) May 03, 2017    Follow-Up    Return in about 4 weeks (around 5/24/2017).      We Ordered or Performed the Following     SERVICE TO GASTROENTEROLOGY       Conditions Discussed Today or Order-Related Diagnoses        Comments    Chronic diarrhea    -  Primary     Laboratory examination ordered as part of a routine general medical examination           Your Vitals Were     BP Pulse Temp Height Weight BMI    112/50 (BP Location: [facility], Patient Position: Sitting, Cuff Size: Regular) 76 97.7 °F (36.5 °C) (Oral) 5' 2\" (1.575 m) 142 lb (64.4 kg) 25.97 kg/m2    Smoking Status                   Never Smoker           Medications Prescribed or Re-Ordered Today     None      Your Current Medications Are        Disp Refills Start End    lansoprazole (PREVACID) 30 MG capsule 30 capsule 6 3/29/2017     Sig - Route: Take 1 capsule by mouth daily. - Oral    Class: Eprescribe    hyoscyamine (LEVSIN SL) 0.125 MG sublingual tablet 30 tablet 0 3/29/2017     Sig - Route: Place 1 tablet under the tongue every 4 hours as needed for Cramping. - Sublingual    Class: Eprescribe    carvedilol (COREG) 12.5 MG tablet 60 tablet 2 3/15/2017     Sig: Take 1 tablet by mouth two  times daily with meals    Class: Eprescribe    furosemide (LASIX) 20 MG tablet 30 tablet 2 3/8/2017     Sig - Route: Take 1 tablet by mouth daily. - Oral    Class: Eprescribe    Cosign for Ordering: Accepted by Dom Ortiz MD on 3/8/2017  4:17 PM    simvastatin (ZOCOR) 20 MG tablet 90 tablet 0 1/6/2017     Sig - Route: Take 1 tablet by mouth nightly. - Oral    Class: Eprescribe    diphenoxylate-atropine (LOMOTIL) 2.5-0.025 MG tablet 60 tablet 3 12/8/2016     Sig - Route: Take 1 tablet by mouth 4 times daily as needed for Diarrhea. - Oral    Cosign for Ordering: Accepted by Miguel Pedro MD on 12/8/2016  3:41 PM    ondansetron (ZOFRAN ODT) 8 MG disintegrating tablet 20 tablet 0 11/29/2016     Sig - Route: Take 1 tablet by mouth every 8  hours as needed for Nausea. - Oral    levocetirizine (XYZAL) 5 MG tablet 30 tablet 1 11/2/2016     Sig - Route: Take 1 tablet by mouth Every evening. - Oral    Class: Eprescribe    Notes to Pharmacy: Cancel other Rx's for levocetirizine    albuterol 108 (90 BASE) MCG/ACT inhaler 1 Inhaler 1 10/25/2016     Sig - Route: Inhale 2 puffs into the lungs every 4 hours as needed for Wheezing (cough). - Inhalation    Class: Eprescribe    fluticasone (FLONASE) 50 MCG/ACT nasal spray 16 g 2 10/17/2016     Sig - Route: Spray 2 sprays in each nostril daily. - Nasal    Class: Eprescribe    Lactobacillus (FLORAJEN ACIDOPHILUS) Cap   9/8/2016     Sig: Take as directed    Class: OTC    escitalopram (LEXAPRO) 10 MG tablet        Sig - Route: Take 10 mg by mouth daily. Indications: Depression - Oral    Class: Historical Med    Menthol, Topical Analgesic, (BIOFREEZE ROLL-ON) 4 % Gel 1 Tube 2 4/20/2016     Sig: Use bid on affected areas as directed    Class: Eprescribe    mirtazapine (REMERON) 30 MG tablet 90 tablet 1 11/16/2015     Sig: Take 1 tablet by mouth  nightly    Class: Eprescribe    acetaminophen (TYLENOL) 325 MG tablet        Sig - Route: Take 650 mg by mouth every 4 hours as needed for Pain. Dose: 2 tablets(= 650 MG). - Oral    Class: Historical Med    cholecalciferol (VITAMIN D3) 1000 UNITS tablet        Sig - Route: Take 2,000 Units by mouth daily.   - Oral    Class: Historical Med    hydroCORTisone (CORTIZONE) 1 % ointment 30 g 0 3/29/2017     Sig - Route: Apply topically 2 times daily as needed (hemorrhoids). - Topical    Class: Eprescribe    clotrimazole 1 % vaginal cream 45 g 0 3/15/2017     Sig: Apply to vaginal area vag every night x7 days    Class: Eprescribe    ranitidine (ZANTAC) 150 MG tablet 180 tablet 1 12/5/2016     Sig: Take 1 tablet by mouth two  times daily    Class: Eprescribe      Discontinued Medications        Reason for Discontinue    ciprofloxacin (CIPRO) 250 MG tablet Therapy Completed      Allergies      Chlorthalidone Other (See Comments)    Pancreatitis    Aciphex [Kdc:rabeprazole+brilliant Blue Fcf] DIARRHEA    Codeine GI UPSET    Lactose Intolerance GI UPSET    Sulfa Antibiotics GI UPSET      Immunizations History as of 4/26/2017     Name Date    HERPES ZOSTER SHINGLES 1/1/2012    Hepatitis B Adult 1/8/2014, 8/28/2013, 7/24/2013, 6/25/2013    Influenza 11/18/2015, 10/1/2014, 10/30/2013    Influenza High Dose Pres Free 9/8/2016    Pneumococcal Conjugate 13 Valent 4/21/2015    Pneumococcal Polysaccharide Adult 5/8/2013, 1/1/2007    Tdap 8/5/2016  8:15 PM      Problem List as of 4/26/2017     Chronic kidney disease, stage IV (severe) followed by dr fritsche    Hypertension    Osteoporosis on vit d and can't take bisphosphanates    Hyperlipidemia    C. difficile colitis last 8/2015 after taking antibiotics    Anemia in CKD (chronic kidney disease)    Acute low back pain    Vasovagal syncope    Chronic bilateral low back pain without sciatica    Lumbosacral spondylosis without myelopathy    Spondylosis of lumbar region without myelopathy or radiculopathy    Insomnia on mirtazine    Anxiety on lorazepam prn from psychiatrist    Seasonal allergies, saw ent, had nasal endocscopy in 2015    Arteriovenous fistula, acquired left arm    Low back pain without sciatica    Anxiety and depression    Irritable bowel syndrome with diarrhea    Chronic left SI joint pain    Hematuria, microscopic    Renal colic    Renal calculus, left    Drug-induced acute pancreatitis            Patient Instructions     None       97.9

## 2023-08-30 NOTE — ED PROVIDER NOTE - OBJECTIVE STATEMENT
63-year-old female past medical history as listed presents for evaluation status post fall.  Patient was walking when she tripped and fell onto the her bilateral legs sustaining abrasion to left lower extremity.  Afterwards patient got up and went and had her nails done during that time started to develop bilateral foot, ankle and knee pain, aggravated with weightbearing, improved at rest.  Denies numbness, weakness, discoloration, head trauma, LOC.

## 2023-08-31 ENCOUNTER — APPOINTMENT (OUTPATIENT)
Dept: CARDIOLOGY | Facility: CLINIC | Age: 63
End: 2023-08-31

## 2023-09-01 ENCOUNTER — APPOINTMENT (OUTPATIENT)
Dept: ORTHOPEDIC SURGERY | Facility: CLINIC | Age: 63
End: 2023-09-01
Payer: COMMERCIAL

## 2023-09-01 VITALS — HEIGHT: 68 IN | BODY MASS INDEX: 34.1 KG/M2 | WEIGHT: 225 LBS

## 2023-09-01 PROCEDURE — 99213 OFFICE O/P EST LOW 20 MIN: CPT

## 2023-09-01 NOTE — DISCUSSION/SUMMARY
[de-identified] : Patient is primary bilateral osteoarthritis of both knees, as well as a sprain of the left knee.  At this time, an MRI is warranted of the left knee to rule out a meniscal injury.  Patient also has bilateral ankle sprains.  At this time, I wrapped the patient's ankles in Ace bandages and encourage patient to modify her activity based on pain, to elevate at rest, and apply ice for the next few days and can transition to heat for all 4 affected joints.  Since patient is on blood thinners, I educated her it is not best to take anti-inflammatories.  Patient is to take Tylenol as needed.  Patient will follow-up with Dr. Orozco in approximately 3 to 4 weeks, and can call 2 to 3 days after the MRI to discuss results.  Patient agrees above plan all questions were answered today

## 2023-09-01 NOTE — HISTORY OF PRESENT ILLNESS
[de-identified] : 63-year-old female presents with bilateral knees and bilateral ankle pain.  Patient fell on 8/30/2023.  Patient was walking and fell on a broken sidewalk.  Patient has had pain in the posterior knees and on the outside of both foot and ankles.  Patient went to [facility] emergency room, where x-rays were taken and read as negative for fractures in the bilateral knees, bilateral foot and ankles.  Patient has been wearing compression, taking anti-inflammatories, and icing for pain relief.  Patient has seen Dr. Orozco for orthopedic concerns in the past.

## 2023-09-01 NOTE — PHYSICAL EXAM
[de-identified] : Physical exam of bilateral knees: -No erythema, edema, ecchymosis present. Superficial abrasions noted over left knee.  Skin intact -TTP popliteal regions of both knees -Calf is soft and nontender bilaterally -Positive Cony's laterally, negative anterior drawer, patient able to straight leg raise -Varus and valgus stability -Full ROM with no pain -+2 posterior tibialis pulse -Sensation intact to light touch   Physical exam of the bilateral ankles/feet: -Small area of ecchymosis noted over right foot laterally dorsal surface.  Skin intact -Of the right ankle and foot, patient has tenderness over the diffuse dorsal aspect of foot as well as the ATFL and PT FL. -Of the left foot and ankle, patient has tenderness to palpation over the big toe, first metatarsal and second toe.  No tenderness in the ankle joint -Patient has full range of motion of the foot and ankle, some pain in the right ankle with range of motion -+2 dorsalis pedis pulse  -Sensation intact to light touch

## 2023-09-01 NOTE — DATA REVIEWED
[FreeTextEntry1] : X-ray images were obtained [facility] and viewed on PACS here.  AP, lateral, oblique views of bilateral feet and ankles reveal no acute fractures, discussions, bony abnormalities.  AP, lateral, oblique views of bilateral knees reveal no acute fractures, dislocations, tricompartmental mild to moderate degenerative changes bilaterally

## 2023-09-05 PROBLEM — I48.20 CHRONIC ATRIAL FIBRILLATION, UNSPECIFIED: Chronic | Status: ACTIVE | Noted: 2023-08-30

## 2023-09-05 PROBLEM — R56.9 UNSPECIFIED CONVULSIONS: Chronic | Status: ACTIVE | Noted: 2023-08-30

## 2023-09-19 ENCOUNTER — APPOINTMENT (OUTPATIENT)
Dept: MRI IMAGING | Facility: CLINIC | Age: 63
End: 2023-09-19

## 2023-10-03 ENCOUNTER — APPOINTMENT (OUTPATIENT)
Dept: ORTHOPEDIC SURGERY | Facility: CLINIC | Age: 63
End: 2023-10-03
Payer: COMMERCIAL

## 2023-10-03 PROCEDURE — 99213 OFFICE O/P EST LOW 20 MIN: CPT

## 2023-10-20 ENCOUNTER — APPOINTMENT (OUTPATIENT)
Dept: ORTHOPEDIC SURGERY | Facility: CLINIC | Age: 63
End: 2023-10-20
Payer: COMMERCIAL

## 2023-10-20 ENCOUNTER — APPOINTMENT (OUTPATIENT)
Dept: MRI IMAGING | Facility: CLINIC | Age: 63
End: 2023-10-20
Payer: COMMERCIAL

## 2023-10-20 DIAGNOSIS — S90.121A CONTUSION OF RIGHT LESSER TOE(S) W/OUT DAMAGE TO NAIL, INITIAL ENCOUNTER: ICD-10-CM

## 2023-10-20 PROCEDURE — 99213 OFFICE O/P EST LOW 20 MIN: CPT

## 2023-10-20 PROCEDURE — 73721 MRI JNT OF LWR EXTRE W/O DYE: CPT | Mod: LT

## 2023-10-20 PROCEDURE — 73660 X-RAY EXAM OF TOE(S): CPT | Mod: RT

## 2023-11-06 ENCOUNTER — APPOINTMENT (OUTPATIENT)
Dept: CARDIOLOGY | Facility: CLINIC | Age: 63
End: 2023-11-06
Payer: COMMERCIAL

## 2023-11-06 VITALS
HEART RATE: 95 BPM | DIASTOLIC BLOOD PRESSURE: 72 MMHG | OXYGEN SATURATION: 97 % | HEIGHT: 68 IN | SYSTOLIC BLOOD PRESSURE: 130 MMHG

## 2023-11-06 DIAGNOSIS — Z86.79 PERSONAL HISTORY OF OTHER DISEASES OF THE CIRCULATORY SYSTEM: ICD-10-CM

## 2023-11-06 PROCEDURE — 93000 ELECTROCARDIOGRAM COMPLETE: CPT

## 2023-11-06 PROCEDURE — 36415 COLL VENOUS BLD VENIPUNCTURE: CPT

## 2023-11-06 PROCEDURE — 99214 OFFICE O/P EST MOD 30 MIN: CPT | Mod: 25

## 2023-11-07 LAB
ALBUMIN SERPL ELPH-MCNC: 4.7 G/DL
ALP BLD-CCNC: 97 U/L
ALT SERPL-CCNC: 13 U/L
ANION GAP SERPL CALC-SCNC: 12 MMOL/L
AST SERPL-CCNC: 18 U/L
BASOPHILS # BLD AUTO: 0.05 K/UL
BASOPHILS NFR BLD AUTO: 0.9 %
BILIRUB SERPL-MCNC: 0.4 MG/DL
BUN SERPL-MCNC: 15 MG/DL
CALCIUM SERPL-MCNC: 9.6 MG/DL
CHLORIDE SERPL-SCNC: 102 MMOL/L
CHOLEST SERPL-MCNC: 180 MG/DL
CO2 SERPL-SCNC: 27 MMOL/L
CREAT SERPL-MCNC: 1.2 MG/DL
EGFR: 51 ML/MIN/1.73M2
EOSINOPHIL # BLD AUTO: 0.1 K/UL
EOSINOPHIL NFR BLD AUTO: 1.9 %
ESTIMATED AVERAGE GLUCOSE: 108 MG/DL
GLUCOSE SERPL-MCNC: 116 MG/DL
HBA1C MFR BLD HPLC: 5.4 %
HCT VFR BLD CALC: 41.8 %
HDLC SERPL-MCNC: 60 MG/DL
HGB BLD-MCNC: 13.9 G/DL
IMM GRANULOCYTES NFR BLD AUTO: 0.2 %
INR PPP: 1.06 RATIO
LDLC SERPL CALC-MCNC: 96 MG/DL
LYMPHOCYTES # BLD AUTO: 1.63 K/UL
LYMPHOCYTES NFR BLD AUTO: 30.8 %
MAN DIFF?: NORMAL
MCHC RBC-ENTMCNC: 31 PG
MCHC RBC-ENTMCNC: 33.3 G/DL
MCV RBC AUTO: 93.1 FL
MONOCYTES # BLD AUTO: 0.43 K/UL
MONOCYTES NFR BLD AUTO: 8.1 %
NEUTROPHILS # BLD AUTO: 3.08 K/UL
NEUTROPHILS NFR BLD AUTO: 58.1 %
NONHDLC SERPL-MCNC: 120 MG/DL
PLATELET # BLD AUTO: 235 K/UL
POTASSIUM SERPL-SCNC: 3.9 MMOL/L
PROT SERPL-MCNC: 6.9 G/DL
PT BLD: 12.1 SEC
RBC # BLD: 4.49 M/UL
RBC # FLD: 13.4 %
SODIUM SERPL-SCNC: 141 MMOL/L
T4 SERPL-MCNC: 7.9 UG/DL
TRIGL SERPL-MCNC: 118 MG/DL
TSH SERPL-ACNC: 1.85 UIU/ML
WBC # FLD AUTO: 5.3 K/UL

## 2023-11-14 ENCOUNTER — APPOINTMENT (OUTPATIENT)
Dept: CV DIAGNOSITCS | Facility: HOSPITAL | Age: 63
End: 2023-11-14
Payer: COMMERCIAL

## 2023-11-14 ENCOUNTER — OUTPATIENT (OUTPATIENT)
Dept: OUTPATIENT SERVICES | Facility: HOSPITAL | Age: 63
LOS: 1 days | End: 2023-11-14
Payer: COMMERCIAL

## 2023-11-14 VITALS
HEIGHT: 68 IN | RESPIRATION RATE: 15 BRPM | SYSTOLIC BLOOD PRESSURE: 156 MMHG | HEART RATE: 84 BPM | WEIGHT: 225.53 LBS | OXYGEN SATURATION: 96 % | TEMPERATURE: 98 F | DIASTOLIC BLOOD PRESSURE: 68 MMHG

## 2023-11-14 DIAGNOSIS — Z98.890 OTHER SPECIFIED POSTPROCEDURAL STATES: Chronic | ICD-10-CM

## 2023-11-14 DIAGNOSIS — R00.2 PALPITATIONS: ICD-10-CM

## 2023-11-14 DIAGNOSIS — N91.2 AMENORRHEA, UNSPECIFIED: Chronic | ICD-10-CM

## 2023-11-14 DIAGNOSIS — I48.91 UNSPECIFIED ATRIAL FIBRILLATION: ICD-10-CM

## 2023-11-14 DIAGNOSIS — Z01.818 ENCOUNTER FOR OTHER PREPROCEDURAL EXAMINATION: ICD-10-CM

## 2023-11-14 PROCEDURE — 93306 TTE W/DOPPLER COMPLETE: CPT

## 2023-11-14 PROCEDURE — 99214 OFFICE O/P EST MOD 30 MIN: CPT | Mod: 25

## 2023-11-14 PROCEDURE — 93005 ELECTROCARDIOGRAM TRACING: CPT

## 2023-11-14 PROCEDURE — 93306 TTE W/DOPPLER COMPLETE: CPT | Mod: 26

## 2023-11-14 PROCEDURE — 93010 ELECTROCARDIOGRAM REPORT: CPT

## 2023-11-14 NOTE — H&P PST ADULT - REASON FOR ADMISSION
Procedure: CARDIOVERSION  Procedure: 60 Minutes  Anesthesia Type: General  PT STATES--I HAVE A H/O- A FIB.  PT DENIES HAVING ANY PAIN.

## 2023-11-14 NOTE — H&P PST ADULT - NSICDXPASTMEDICALHX_GEN_ALL_CORE_FT
PAST MEDICAL HISTORY:  Brain aneurysm     Brain tumor, recurrent     Cervical cancer     Chronic atrial fibrillation     H/O fibromyalgia     Hypothyroid     Seizures     Spine anomaly     Uterine cancer, sarcoma     Vertigo      PAST MEDICAL HISTORY:  Brain aneurysm     Brain tumor, recurrent     Cervical cancer     Chronic atrial fibrillation     H/O fibromyalgia     Hypothyroid     Obesity     Seizures     Spine anomaly     Uterine cancer, sarcoma     Vertigo

## 2023-11-14 NOTE — H&P PST ADULT - HISTORY OF PRESENT ILLNESS
PT PRESENTS TO PAST WITH NO SOB, CP, PALPITATIONS, DYSURIA, UTI OR URI AT PRESENT.  AS PER THE PT, THIS IS HIS/HER COMPLETE MEDICAL AND SURGICAL HX, INCLUDING MEDICATIONS PRESCRIBED AND OVER THE COUNTER  pt denies any covid s/s,     X3    2019- 2023   tested positive in the past  pt advised self quarantine till day of procedure  denies travel outside the [place] in the past 30 days  Anesthesia Alert  NO--Difficult Airway  YES  BACH FUSION    --History of neck surgery      NO   radiation  NO--Limited ROM of neck  NO--History of Malignant hyperthermia  NO--Personal or family history of Pseudocholinesterase deficiency  NO--Prior Anesthesia Complication  NO--Latex Allergy  NO--Loose teeth  NO--History of Rheumatoid Arthritis  NO--KRISTIAN  NO BLEEDING RISK  NO--Other_____  [facility] Activity Status Index (DASI) from [employer]  on 11/14/2023  ** All calculations should be rechecked by clinician prior to use **    RESULT SUMMARY:  18 points  The higher the score (maximum 58.2), the higher the functional status.    4.95 METs        INPUTS:  Take care of self —> 2.75 = Yes  Walk indoors —> 1.75 = Yes  Walk 1&ndash;2 blocks on level ground —> 2.75 = Yes  Climb a flight of stairs or walk up a hill —> 5.5 = Yes  Run a short distance —> 0 = No  Do light work around the house —> 0 = No  Do moderate work around the house —> 0 = No  Do heavy work around the house —> 0 = No  Do yardwork —> 0 = No  Have sexual relations —> 5.25 = Yes  Participate in moderate recreational activities —> 0 = No  Participate in strenuous sports —> 0 = No  Revised Cardiac Risk Index for Pre-Operative Risk from [employer]  on 11/14/2023  ** All calculations should be rechecked by clinician prior to use **    RESULT SUMMARY:  1 points  Class II Risk    6.0 %  30-day risk of death, MI, or cardiac arrest    From [place] 2017, based on pooled data from 5 high quality external validations (4 prospective). These numbers are higher than those often quoted from the now-outdated original study (Tc 1999). See Evidence for details.      INPUTS:  Elevated-risk surgery —> 0 = No  History of ischemic heart disease —> 0 = No  History of congestive heart failure —> 0 = No  History of cerebrovascular disease —> 1 = Yes  Pre-operative treatment with insulin —> 0 = No  Pre-operative creatinine >2 mg/dL / 176.8 µmol/L —> 0 = No

## 2023-11-14 NOTE — H&P PST ADULT - ADDITIONAL PE
AIRWAY CLASS--3  PT DENIES HAVING ANY LOOSE TEETH AIRWAY CLASS--3  PT DENIES HAVING ANY LOOSE TEETH  AS PER PT- NO RESIDUAL FROM TIA'S OTHER THEN VISUAL ISSUES

## 2023-11-15 DIAGNOSIS — I48.91 UNSPECIFIED ATRIAL FIBRILLATION: ICD-10-CM

## 2023-11-15 DIAGNOSIS — R00.2 PALPITATIONS: ICD-10-CM

## 2023-11-15 DIAGNOSIS — Z01.818 ENCOUNTER FOR OTHER PREPROCEDURAL EXAMINATION: ICD-10-CM

## 2023-11-22 ENCOUNTER — NON-APPOINTMENT (OUTPATIENT)
Age: 63
End: 2023-11-22

## 2023-11-29 ENCOUNTER — OUTPATIENT (OUTPATIENT)
Dept: OUTPATIENT SERVICES | Facility: HOSPITAL | Age: 63
LOS: 1 days | Discharge: HOME | End: 2023-11-29
Payer: COMMERCIAL

## 2023-11-29 ENCOUNTER — INPATIENT (INPATIENT)
Facility: HOSPITAL | Age: 63
LOS: 0 days | Discharge: HOME | DRG: 381 | End: 2023-11-30
Attending: HOSPITALIST | Admitting: FAMILY MEDICINE
Payer: COMMERCIAL

## 2023-11-29 VITALS — RESPIRATION RATE: 15 BRPM | DIASTOLIC BLOOD PRESSURE: 83 MMHG | SYSTOLIC BLOOD PRESSURE: 154 MMHG

## 2023-11-29 VITALS
TEMPERATURE: 97 F | HEART RATE: 81 BPM | HEIGHT: 68 IN | RESPIRATION RATE: 17 BRPM | WEIGHT: 225.09 LBS | OXYGEN SATURATION: 98 % | DIASTOLIC BLOOD PRESSURE: 97 MMHG | SYSTOLIC BLOOD PRESSURE: 167 MMHG

## 2023-11-29 DIAGNOSIS — Z98.890 OTHER SPECIFIED POSTPROCEDURAL STATES: Chronic | ICD-10-CM

## 2023-11-29 DIAGNOSIS — R00.2 PALPITATIONS: ICD-10-CM

## 2023-11-29 DIAGNOSIS — N91.2 AMENORRHEA, UNSPECIFIED: Chronic | ICD-10-CM

## 2023-11-29 DIAGNOSIS — I48.91 UNSPECIFIED ATRIAL FIBRILLATION: ICD-10-CM

## 2023-11-29 PROCEDURE — 99285 EMERGENCY DEPT VISIT HI MDM: CPT

## 2023-11-29 PROCEDURE — 92960 CARDIOVERSION ELECTRIC EXT: CPT

## 2023-11-29 RX ORDER — ONDANSETRON 8 MG/1
4 TABLET, FILM COATED ORAL ONCE
Refills: 0 | Status: DISCONTINUED | OUTPATIENT
Start: 2023-11-29 | End: 2023-11-29

## 2023-11-29 RX ORDER — SODIUM CHLORIDE 9 MG/ML
1000 INJECTION, SOLUTION INTRAVENOUS
Refills: 0 | Status: DISCONTINUED | OUTPATIENT
Start: 2023-11-29 | End: 2023-11-29

## 2023-11-29 NOTE — CHART NOTE - NSCHARTNOTEFT_GEN_A_CORE
PACU ANESTHESIA ADMISSION NOTE      Procedure: Cardioversion  Post op diagnosis:  Atrial fibrillation    __x__  Patent Airway    __x__  Full return of protective reflexes    __x__  Full recovery from anesthesia / back to baseline status    Vitals:  T(C): 36.2 (11-29-23 @ 07:29), Max: 36.2 (11-29-23 @ 06:24)  HR: 81 (11-29-23 @ 07:29) (81 - 81)  BP: 167/97 (11-29-23 @ 07:29) (167/97 - 167/97)  RR: 17 (11-29-23 @ 07:29) (17 - 17)  SpO2: 98% (11-29-23 @ 07:29) (98% - 98%)    Mental Status:  __x__ Awake   ___x__ Alert   _____ Drowsy   _____ Sedated    Nausea/Vomiting:  __x__ NO  ______Yes,   See Post - Op Orders          Pain Scale (0-10):  __0___    Treatment: ____ None    __x__ See Post - Op/PCA Orders    Post - Operative Fluids:   ____ Oral   __x__ See Post - Op Orders    Plan: Discharge:   _x___Home       _____Floor     _____Critical Care    _____  Other:_________________    Comments: Patient had smooth intraoperative event, no anesthesia complication.  PACU Vital signs: HR:  60          BP:        124/88          RR: 16            O2 Sat:       100%

## 2023-11-29 NOTE — ASU PATIENT PROFILE, ADULT - FALL HARM RISK - UNIVERSAL INTERVENTIONS
Bed in lowest position, wheels locked, appropriate side rails in place/Call bell, personal items and telephone in reach/Instruct patient to call for assistance before getting out of bed or chair/Non-slip footwear when patient is out of bed/[place] to call system/Physically safe environment - no spills, clutter or unnecessary equipment/Purposeful Proactive Rounding/Room/bathroom lighting operational, light cord in reach

## 2023-11-29 NOTE — ASU DISCHARGE PLAN (ADULT/PEDIATRIC) - NS MD DC FALL RISK RISK
For information on Fall & Injury Prevention, visit: [email or web address] OR  [email or web address] OR  https://www.cdc.gov/steadi/patient.html

## 2023-11-30 ENCOUNTER — TRANSCRIPTION ENCOUNTER (OUTPATIENT)
Age: 63
End: 2023-11-30

## 2023-11-30 VITALS
OXYGEN SATURATION: 97 % | HEIGHT: 68 IN | WEIGHT: 225.09 LBS | DIASTOLIC BLOOD PRESSURE: 91 MMHG | HEART RATE: 73 BPM | RESPIRATION RATE: 19 BRPM | SYSTOLIC BLOOD PRESSURE: 210 MMHG | TEMPERATURE: 98 F

## 2023-11-30 VITALS
HEART RATE: 71 BPM | TEMPERATURE: 98 F | RESPIRATION RATE: 16 BRPM | DIASTOLIC BLOOD PRESSURE: 74 MMHG | SYSTOLIC BLOOD PRESSURE: 128 MMHG

## 2023-11-30 DIAGNOSIS — R07.9 CHEST PAIN, UNSPECIFIED: ICD-10-CM

## 2023-11-30 DIAGNOSIS — I48.91 UNSPECIFIED ATRIAL FIBRILLATION: ICD-10-CM

## 2023-11-30 PROBLEM — E66.9 OBESITY, UNSPECIFIED: Chronic | Status: ACTIVE | Noted: 2023-11-14

## 2023-11-30 PROBLEM — Z87.39 PERSONAL HISTORY OF OTHER DISEASES OF THE MUSCULOSKELETAL SYSTEM AND CONNECTIVE TISSUE: Chronic | Status: ACTIVE | Noted: 2023-11-14

## 2023-11-30 PROBLEM — Z87.09 PERSONAL HISTORY OF OTHER DISEASES OF THE RESPIRATORY SYSTEM: Chronic | Status: ACTIVE | Noted: 2023-11-29

## 2023-11-30 LAB
ALBUMIN SERPL ELPH-MCNC: 4.2 G/DL — SIGNIFICANT CHANGE UP (ref 3.5–5.2)
ALBUMIN SERPL ELPH-MCNC: 4.2 G/DL — SIGNIFICANT CHANGE UP (ref 3.5–5.2)
ALP SERPL-CCNC: 92 U/L — SIGNIFICANT CHANGE UP (ref 30–115)
ALP SERPL-CCNC: 92 U/L — SIGNIFICANT CHANGE UP (ref 30–115)
ALT FLD-CCNC: 20 U/L — SIGNIFICANT CHANGE UP (ref 0–41)
ALT FLD-CCNC: 20 U/L — SIGNIFICANT CHANGE UP (ref 0–41)
ANION GAP SERPL CALC-SCNC: 10 MMOL/L — SIGNIFICANT CHANGE UP (ref 7–14)
ANION GAP SERPL CALC-SCNC: 10 MMOL/L — SIGNIFICANT CHANGE UP (ref 7–14)
ANION GAP SERPL CALC-SCNC: 7 MMOL/L — SIGNIFICANT CHANGE UP (ref 7–14)
ANION GAP SERPL CALC-SCNC: 7 MMOL/L — SIGNIFICANT CHANGE UP (ref 7–14)
APTT BLD: 34.3 SEC — SIGNIFICANT CHANGE UP (ref 27–39.2)
APTT BLD: 34.3 SEC — SIGNIFICANT CHANGE UP (ref 27–39.2)
AST SERPL-CCNC: 23 U/L — SIGNIFICANT CHANGE UP (ref 0–41)
AST SERPL-CCNC: 23 U/L — SIGNIFICANT CHANGE UP (ref 0–41)
BASOPHILS # BLD AUTO: 0.07 K/UL — SIGNIFICANT CHANGE UP (ref 0–0.2)
BASOPHILS # BLD AUTO: 0.07 K/UL — SIGNIFICANT CHANGE UP (ref 0–0.2)
BASOPHILS NFR BLD AUTO: 1 % — SIGNIFICANT CHANGE UP (ref 0–1)
BASOPHILS NFR BLD AUTO: 1 % — SIGNIFICANT CHANGE UP (ref 0–1)
BILIRUB SERPL-MCNC: 0.3 MG/DL — SIGNIFICANT CHANGE UP (ref 0.2–1.2)
BILIRUB SERPL-MCNC: 0.3 MG/DL — SIGNIFICANT CHANGE UP (ref 0.2–1.2)
BUN SERPL-MCNC: 15 MG/DL — SIGNIFICANT CHANGE UP (ref 10–20)
CALCIUM SERPL-MCNC: 9.4 MG/DL — SIGNIFICANT CHANGE UP (ref 8.4–10.5)
CHLORIDE SERPL-SCNC: 102 MMOL/L — SIGNIFICANT CHANGE UP (ref 98–110)
CHLORIDE SERPL-SCNC: 102 MMOL/L — SIGNIFICANT CHANGE UP (ref 98–110)
CHLORIDE SERPL-SCNC: 104 MMOL/L — SIGNIFICANT CHANGE UP (ref 98–110)
CHLORIDE SERPL-SCNC: 104 MMOL/L — SIGNIFICANT CHANGE UP (ref 98–110)
CO2 SERPL-SCNC: 28 MMOL/L — SIGNIFICANT CHANGE UP (ref 17–32)
CO2 SERPL-SCNC: 28 MMOL/L — SIGNIFICANT CHANGE UP (ref 17–32)
CO2 SERPL-SCNC: 31 MMOL/L — SIGNIFICANT CHANGE UP (ref 17–32)
CO2 SERPL-SCNC: 31 MMOL/L — SIGNIFICANT CHANGE UP (ref 17–32)
CREAT SERPL-MCNC: 1 MG/DL — SIGNIFICANT CHANGE UP (ref 0.7–1.5)
CREAT SERPL-MCNC: 1 MG/DL — SIGNIFICANT CHANGE UP (ref 0.7–1.5)
CREAT SERPL-MCNC: 1.1 MG/DL — SIGNIFICANT CHANGE UP (ref 0.7–1.5)
CREAT SERPL-MCNC: 1.1 MG/DL — SIGNIFICANT CHANGE UP (ref 0.7–1.5)
EGFR: 56 ML/MIN/1.73M2 — LOW
EGFR: 56 ML/MIN/1.73M2 — LOW
EGFR: 63 ML/MIN/1.73M2 — SIGNIFICANT CHANGE UP
EGFR: 63 ML/MIN/1.73M2 — SIGNIFICANT CHANGE UP
EOSINOPHIL # BLD AUTO: 0.17 K/UL — SIGNIFICANT CHANGE UP (ref 0–0.7)
EOSINOPHIL # BLD AUTO: 0.17 K/UL — SIGNIFICANT CHANGE UP (ref 0–0.7)
EOSINOPHIL NFR BLD AUTO: 2.5 % — SIGNIFICANT CHANGE UP (ref 0–8)
EOSINOPHIL NFR BLD AUTO: 2.5 % — SIGNIFICANT CHANGE UP (ref 0–8)
GLUCOSE SERPL-MCNC: 90 MG/DL — SIGNIFICANT CHANGE UP (ref 70–99)
GLUCOSE SERPL-MCNC: 90 MG/DL — SIGNIFICANT CHANGE UP (ref 70–99)
GLUCOSE SERPL-MCNC: 91 MG/DL — SIGNIFICANT CHANGE UP (ref 70–99)
GLUCOSE SERPL-MCNC: 91 MG/DL — SIGNIFICANT CHANGE UP (ref 70–99)
HCT VFR BLD CALC: 37.6 % — SIGNIFICANT CHANGE UP (ref 37–47)
HCT VFR BLD CALC: 37.6 % — SIGNIFICANT CHANGE UP (ref 37–47)
HCT VFR BLD CALC: 38.4 % — SIGNIFICANT CHANGE UP (ref 37–47)
HCT VFR BLD CALC: 38.4 % — SIGNIFICANT CHANGE UP (ref 37–47)
HGB BLD-MCNC: 12.4 G/DL — SIGNIFICANT CHANGE UP (ref 12–16)
HGB BLD-MCNC: 12.4 G/DL — SIGNIFICANT CHANGE UP (ref 12–16)
HGB BLD-MCNC: 12.8 G/DL — SIGNIFICANT CHANGE UP (ref 12–16)
HGB BLD-MCNC: 12.8 G/DL — SIGNIFICANT CHANGE UP (ref 12–16)
IMM GRANULOCYTES NFR BLD AUTO: 0.3 % — SIGNIFICANT CHANGE UP (ref 0.1–0.3)
IMM GRANULOCYTES NFR BLD AUTO: 0.3 % — SIGNIFICANT CHANGE UP (ref 0.1–0.3)
INR BLD: 1.04 RATIO — SIGNIFICANT CHANGE UP (ref 0.65–1.3)
INR BLD: 1.04 RATIO — SIGNIFICANT CHANGE UP (ref 0.65–1.3)
LYMPHOCYTES # BLD AUTO: 1.99 K/UL — SIGNIFICANT CHANGE UP (ref 1.2–3.4)
LYMPHOCYTES # BLD AUTO: 1.99 K/UL — SIGNIFICANT CHANGE UP (ref 1.2–3.4)
LYMPHOCYTES # BLD AUTO: 29.4 % — SIGNIFICANT CHANGE UP (ref 20.5–51.1)
LYMPHOCYTES # BLD AUTO: 29.4 % — SIGNIFICANT CHANGE UP (ref 20.5–51.1)
MAGNESIUM SERPL-MCNC: 2 MG/DL — SIGNIFICANT CHANGE UP (ref 1.8–2.4)
MAGNESIUM SERPL-MCNC: 2 MG/DL — SIGNIFICANT CHANGE UP (ref 1.8–2.4)
MAGNESIUM SERPL-MCNC: 2.1 MG/DL — SIGNIFICANT CHANGE UP (ref 1.8–2.4)
MAGNESIUM SERPL-MCNC: 2.1 MG/DL — SIGNIFICANT CHANGE UP (ref 1.8–2.4)
MCHC RBC-ENTMCNC: 31 PG — SIGNIFICANT CHANGE UP (ref 27–31)
MCHC RBC-ENTMCNC: 31 PG — SIGNIFICANT CHANGE UP (ref 27–31)
MCHC RBC-ENTMCNC: 31.2 PG — HIGH (ref 27–31)
MCHC RBC-ENTMCNC: 31.2 PG — HIGH (ref 27–31)
MCHC RBC-ENTMCNC: 33 G/DL — SIGNIFICANT CHANGE UP (ref 32–37)
MCHC RBC-ENTMCNC: 33 G/DL — SIGNIFICANT CHANGE UP (ref 32–37)
MCHC RBC-ENTMCNC: 33.3 G/DL — SIGNIFICANT CHANGE UP (ref 32–37)
MCHC RBC-ENTMCNC: 33.3 G/DL — SIGNIFICANT CHANGE UP (ref 32–37)
MCV RBC AUTO: 93.7 FL — SIGNIFICANT CHANGE UP (ref 81–99)
MCV RBC AUTO: 93.7 FL — SIGNIFICANT CHANGE UP (ref 81–99)
MCV RBC AUTO: 94 FL — SIGNIFICANT CHANGE UP (ref 81–99)
MCV RBC AUTO: 94 FL — SIGNIFICANT CHANGE UP (ref 81–99)
MONOCYTES # BLD AUTO: 0.55 K/UL — SIGNIFICANT CHANGE UP (ref 0.1–0.6)
MONOCYTES # BLD AUTO: 0.55 K/UL — SIGNIFICANT CHANGE UP (ref 0.1–0.6)
MONOCYTES NFR BLD AUTO: 8.1 % — SIGNIFICANT CHANGE UP (ref 1.7–9.3)
MONOCYTES NFR BLD AUTO: 8.1 % — SIGNIFICANT CHANGE UP (ref 1.7–9.3)
NEUTROPHILS # BLD AUTO: 3.96 K/UL — SIGNIFICANT CHANGE UP (ref 1.4–6.5)
NEUTROPHILS # BLD AUTO: 3.96 K/UL — SIGNIFICANT CHANGE UP (ref 1.4–6.5)
NEUTROPHILS NFR BLD AUTO: 58.7 % — SIGNIFICANT CHANGE UP (ref 42.2–75.2)
NEUTROPHILS NFR BLD AUTO: 58.7 % — SIGNIFICANT CHANGE UP (ref 42.2–75.2)
NRBC # BLD: 0 /100 WBCS — SIGNIFICANT CHANGE UP (ref 0–0)
NT-PROBNP SERPL-SCNC: 282 PG/ML — SIGNIFICANT CHANGE UP (ref 0–300)
NT-PROBNP SERPL-SCNC: 282 PG/ML — SIGNIFICANT CHANGE UP (ref 0–300)
PHOSPHATE SERPL-MCNC: 4.4 MG/DL — SIGNIFICANT CHANGE UP (ref 2.1–4.9)
PHOSPHATE SERPL-MCNC: 4.4 MG/DL — SIGNIFICANT CHANGE UP (ref 2.1–4.9)
PLATELET # BLD AUTO: 199 K/UL — SIGNIFICANT CHANGE UP (ref 130–400)
PLATELET # BLD AUTO: 199 K/UL — SIGNIFICANT CHANGE UP (ref 130–400)
PLATELET # BLD AUTO: 205 K/UL — SIGNIFICANT CHANGE UP (ref 130–400)
PLATELET # BLD AUTO: 205 K/UL — SIGNIFICANT CHANGE UP (ref 130–400)
PMV BLD: 11.1 FL — HIGH (ref 7.4–10.4)
PMV BLD: 11.1 FL — HIGH (ref 7.4–10.4)
PMV BLD: 11.4 FL — HIGH (ref 7.4–10.4)
PMV BLD: 11.4 FL — HIGH (ref 7.4–10.4)
POTASSIUM SERPL-MCNC: 3.7 MMOL/L — SIGNIFICANT CHANGE UP (ref 3.5–5)
POTASSIUM SERPL-MCNC: 3.7 MMOL/L — SIGNIFICANT CHANGE UP (ref 3.5–5)
POTASSIUM SERPL-MCNC: 4.7 MMOL/L — SIGNIFICANT CHANGE UP (ref 3.5–5)
POTASSIUM SERPL-MCNC: 4.7 MMOL/L — SIGNIFICANT CHANGE UP (ref 3.5–5)
POTASSIUM SERPL-SCNC: 3.7 MMOL/L — SIGNIFICANT CHANGE UP (ref 3.5–5)
POTASSIUM SERPL-SCNC: 3.7 MMOL/L — SIGNIFICANT CHANGE UP (ref 3.5–5)
POTASSIUM SERPL-SCNC: 4.7 MMOL/L — SIGNIFICANT CHANGE UP (ref 3.5–5)
POTASSIUM SERPL-SCNC: 4.7 MMOL/L — SIGNIFICANT CHANGE UP (ref 3.5–5)
PROT SERPL-MCNC: 6 G/DL — SIGNIFICANT CHANGE UP (ref 6–8)
PROT SERPL-MCNC: 6 G/DL — SIGNIFICANT CHANGE UP (ref 6–8)
PROTHROM AB SERPL-ACNC: 11.9 SEC — SIGNIFICANT CHANGE UP (ref 9.95–12.87)
PROTHROM AB SERPL-ACNC: 11.9 SEC — SIGNIFICANT CHANGE UP (ref 9.95–12.87)
RBC # BLD: 4 M/UL — LOW (ref 4.2–5.4)
RBC # BLD: 4 M/UL — LOW (ref 4.2–5.4)
RBC # BLD: 4.1 M/UL — LOW (ref 4.2–5.4)
RBC # BLD: 4.1 M/UL — LOW (ref 4.2–5.4)
RBC # FLD: 13.6 % — SIGNIFICANT CHANGE UP (ref 11.5–14.5)
SODIUM SERPL-SCNC: 140 MMOL/L — SIGNIFICANT CHANGE UP (ref 135–146)
SODIUM SERPL-SCNC: 140 MMOL/L — SIGNIFICANT CHANGE UP (ref 135–146)
SODIUM SERPL-SCNC: 142 MMOL/L — SIGNIFICANT CHANGE UP (ref 135–146)
SODIUM SERPL-SCNC: 142 MMOL/L — SIGNIFICANT CHANGE UP (ref 135–146)
TROPONIN T SERPL-MCNC: <0.01 NG/ML — SIGNIFICANT CHANGE UP
WBC # BLD: 5.67 K/UL — SIGNIFICANT CHANGE UP (ref 4.8–10.8)
WBC # BLD: 5.67 K/UL — SIGNIFICANT CHANGE UP (ref 4.8–10.8)
WBC # BLD: 6.76 K/UL — SIGNIFICANT CHANGE UP (ref 4.8–10.8)
WBC # BLD: 6.76 K/UL — SIGNIFICANT CHANGE UP (ref 4.8–10.8)
WBC # FLD AUTO: 5.67 K/UL — SIGNIFICANT CHANGE UP (ref 4.8–10.8)
WBC # FLD AUTO: 5.67 K/UL — SIGNIFICANT CHANGE UP (ref 4.8–10.8)
WBC # FLD AUTO: 6.76 K/UL — SIGNIFICANT CHANGE UP (ref 4.8–10.8)
WBC # FLD AUTO: 6.76 K/UL — SIGNIFICANT CHANGE UP (ref 4.8–10.8)

## 2023-11-30 PROCEDURE — 80048 BASIC METABOLIC PNL TOTAL CA: CPT

## 2023-11-30 PROCEDURE — 93010 ELECTROCARDIOGRAM REPORT: CPT

## 2023-11-30 PROCEDURE — 83735 ASSAY OF MAGNESIUM: CPT

## 2023-11-30 PROCEDURE — G0378: CPT

## 2023-11-30 PROCEDURE — 85027 COMPLETE CBC AUTOMATED: CPT

## 2023-11-30 PROCEDURE — 84100 ASSAY OF PHOSPHORUS: CPT

## 2023-11-30 PROCEDURE — 99222 1ST HOSP IP/OBS MODERATE 55: CPT

## 2023-11-30 PROCEDURE — 36415 COLL VENOUS BLD VENIPUNCTURE: CPT

## 2023-11-30 PROCEDURE — 71045 X-RAY EXAM CHEST 1 VIEW: CPT | Mod: 26

## 2023-11-30 PROCEDURE — 84484 ASSAY OF TROPONIN QUANT: CPT

## 2023-11-30 RX ORDER — ERGOCALCIFEROL 1.25 MG/1
0 CAPSULE ORAL
Refills: 0 | DISCHARGE

## 2023-11-30 RX ORDER — ATORVASTATIN CALCIUM 80 MG/1
10 TABLET, FILM COATED ORAL AT BEDTIME
Refills: 0 | Status: DISCONTINUED | OUTPATIENT
Start: 2023-11-30 | End: 2023-11-30

## 2023-11-30 RX ORDER — ESOMEPRAZOLE MAGNESIUM 40 MG/1
1 CAPSULE, DELAYED RELEASE ORAL
Qty: 60 | Refills: 0
Start: 2023-11-30 | End: 2023-12-29

## 2023-11-30 RX ORDER — FAMOTIDINE 10 MG/ML
40 INJECTION INTRAVENOUS DAILY
Refills: 0 | Status: DISCONTINUED | OUTPATIENT
Start: 2023-11-30 | End: 2023-11-30

## 2023-11-30 RX ORDER — ESCITALOPRAM OXALATE 10 MG/1
10 TABLET, FILM COATED ORAL DAILY
Refills: 0 | Status: DISCONTINUED | OUTPATIENT
Start: 2023-11-30 | End: 2023-11-30

## 2023-11-30 RX ORDER — LEVETIRACETAM 250 MG/1
1 TABLET, FILM COATED ORAL
Refills: 0 | DISCHARGE

## 2023-11-30 RX ORDER — DIPHENHYDRAMINE HYDROCHLORIDE AND LIDOCAINE HYDROCHLORIDE AND ALUMINUM HYDROXIDE AND MAGNESIUM HYDRO
30 KIT ONCE
Refills: 0 | Status: COMPLETED | OUTPATIENT
Start: 2023-11-30 | End: 2023-11-30

## 2023-11-30 RX ORDER — LISINOPRIL 2.5 MG/1
40 TABLET ORAL DAILY
Refills: 0 | Status: DISCONTINUED | OUTPATIENT
Start: 2023-11-30 | End: 2023-11-30

## 2023-11-30 RX ORDER — APIXABAN 2.5 MG/1
5 TABLET, FILM COATED ORAL EVERY 12 HOURS
Refills: 0 | Status: DISCONTINUED | OUTPATIENT
Start: 2023-12-01 | End: 2023-11-30

## 2023-11-30 RX ORDER — OXYCODONE AND ACETAMINOPHEN 5; 325 MG/1; MG/1
0 TABLET ORAL
Qty: 0 | Refills: 0 | DISCHARGE

## 2023-11-30 RX ORDER — LEVOTHYROXINE SODIUM 125 MCG
300 TABLET ORAL DAILY
Refills: 0 | Status: DISCONTINUED | OUTPATIENT
Start: 2023-11-30 | End: 2023-11-30

## 2023-11-30 RX ORDER — ESOMEPRAZOLE MAGNESIUM 40 MG/1
1 CAPSULE, DELAYED RELEASE ORAL
Refills: 0 | DISCHARGE

## 2023-11-30 RX ORDER — METOPROLOL TARTRATE 50 MG
0 TABLET ORAL
Refills: 0 | DISCHARGE

## 2023-11-30 RX ORDER — CHLORHEXIDINE GLUCONATE 213 G/1000ML
1 SOLUTION TOPICAL
Refills: 0 | Status: DISCONTINUED | OUTPATIENT
Start: 2023-11-30 | End: 2023-11-30

## 2023-11-30 RX ORDER — ATORVASTATIN CALCIUM 80 MG/1
1 TABLET, FILM COATED ORAL
Qty: 0 | Refills: 0 | DISCHARGE

## 2023-11-30 RX ORDER — APIXABAN 2.5 MG/1
0 TABLET, FILM COATED ORAL
Refills: 0 | DISCHARGE

## 2023-11-30 RX ORDER — HEPARIN SODIUM 5000 [USP'U]/ML
5000 INJECTION INTRAVENOUS; SUBCUTANEOUS EVERY 8 HOURS
Refills: 0 | Status: DISCONTINUED | OUTPATIENT
Start: 2023-11-30 | End: 2023-11-30

## 2023-11-30 RX ORDER — LEVETIRACETAM 250 MG/1
750 TABLET, FILM COATED ORAL
Refills: 0 | Status: DISCONTINUED | OUTPATIENT
Start: 2023-11-30 | End: 2023-11-30

## 2023-11-30 RX ORDER — SUCRALFATE 1 G
10 TABLET ORAL
Qty: 900 | Refills: 0
Start: 2023-11-30 | End: 2023-12-29

## 2023-11-30 RX ORDER — FAMOTIDINE 10 MG/ML
20 INJECTION INTRAVENOUS ONCE
Refills: 0 | Status: COMPLETED | OUTPATIENT
Start: 2023-11-30 | End: 2023-11-30

## 2023-11-30 RX ORDER — ESTROGENS, CONJUGATED 0.625 MG
0 TABLET ORAL
Qty: 0 | Refills: 0 | DISCHARGE

## 2023-11-30 RX ORDER — LEVETIRACETAM 250 MG/1
1500 TABLET, FILM COATED ORAL
Refills: 0 | Status: DISCONTINUED | OUTPATIENT
Start: 2023-11-30 | End: 2023-11-30

## 2023-11-30 RX ORDER — METOPROLOL TARTRATE 50 MG
25 TABLET ORAL DAILY
Refills: 0 | Status: DISCONTINUED | OUTPATIENT
Start: 2023-11-30 | End: 2023-11-30

## 2023-11-30 RX ADMIN — Medication 300 MICROGRAM(S): at 05:12

## 2023-11-30 RX ADMIN — FAMOTIDINE 20 MILLIGRAM(S): 10 INJECTION INTRAVENOUS at 00:44

## 2023-11-30 RX ADMIN — LISINOPRIL 40 MILLIGRAM(S): 2.5 TABLET ORAL at 05:12

## 2023-11-30 RX ADMIN — DIPHENHYDRAMINE HYDROCHLORIDE AND LIDOCAINE HYDROCHLORIDE AND ALUMINUM HYDROXIDE AND MAGNESIUM HYDRO 30 MILLILITER(S): KIT at 00:45

## 2023-11-30 RX ADMIN — LEVETIRACETAM 1500 MILLIGRAM(S): 250 TABLET, FILM COATED ORAL at 05:12

## 2023-11-30 RX ADMIN — Medication 25 MILLIGRAM(S): at 05:12

## 2023-11-30 NOTE — CONSULT NOTE ADULT - SUBJECTIVE AND OBJECTIVE BOX
HPI:  Patient is a 64 y/o F with a pmhx of HLD, seizure disorder, hypothyroidism, A.fib (on eliquis)  s/p cardioversion with cardiologist Dr. Rudolph who presents for substernal chest pain that radiates to the neck that began this afternoon while watching TV. Describes the pain as "stabbing" rating it a 10/10 in intensity that comes and goes. Denies fever, chills, nausea, vomiting, lower extremity edema, shortness of breath. Pain is aggravating with swallowing. Tried taking tums and famotidine without relief of symptoms . Reports the pain improved while in the ER, however reoccurs when swallowing.   currently CP free  (30 Nov 2023 02:50)        Cardiology Update: 63 y/ f pmh Afib on Eliquis s/p successful DCCV yesterday follows up with Dr Rudolph , htn, hld, hypothyroidism obesity, pettit's esophagitis  presents for evaluation of chest pain. Pt reports acute burning chest pain non -exertional, aggravated by swallowing. Denies sob, palpitation, n/v, dizziness, fever/chills.             PAST MEDICAL & SURGICAL HISTORY  Vertigo    Brain aneurysm    Brain tumor, recurrent    Cervical cancer    Uterine cancer, sarcoma    Spine anomaly    Hypothyroid    Seizures    Chronic atrial fibrillation    H/O fibromyalgia    Obesity    H/O vocal cord paralysis    H/O abdominal hysterectomy    H/O cerebral aneurysm repair    H/O Spinal surgery  tumor removed plates in neck    Cessation of menses  c section        FAMILY HISTORY:  FAMILY HISTORY:  No pertinent family history in first degree relatives        SOCIAL HISTORY:  []smoker  []Alcohol  []Drug    ALLERGIES:  No Known Allergies      MEDICATIONS:  MEDICATIONS  (STANDING):  atorvastatin 10 milliGRAM(s) Oral at bedtime  chlorhexidine 2% Cloths 1 Application(s) Topical <User Schedule>  escitalopram 10 milliGRAM(s) Oral daily  famotidine    Tablet 40 milliGRAM(s) Oral daily  levETIRAcetam 1500 milliGRAM(s) Oral two times a day  levothyroxine 300 MICROGram(s) Oral daily  lisinopril 40 milliGRAM(s) Oral daily  metoprolol succinate ER 25 milliGRAM(s) Oral daily    MEDICATIONS  (PRN):      HOME MEDICATIONS:  Home Medications:  apixaban 5 mg oral tablet: 1 tab(s) orally 2 times a day (30 Nov 2023 03:14)  atorvastatin 10 mg oral tablet: 1 tab(s) orally once a day (29 Nov 2023 06:37)  Calciferol 1.25 mg (50,000 intl units) oral tablet: orally once a day (29 Nov 2023 06:37)  escitalopram 10 mg oral tablet: 1 tab(s) orally once a day (29 Nov 2023 06:37)  famotidine 40 mg oral tablet: 1 tab(s) orally once a day (at bedtime) (29 Nov 2023 06:37)  Keppra: 1500  orally 2 times a day (29 Nov 2023 06:37)  metoprolol succinate 25 mg oral capsule, extended release: 1 cap(s) orally once a day (30 Nov 2023 03:14)  Premarin: 675 once a day (29 Nov 2023 06:37)  Premarin 0.625 mg oral tablet: 1 tab(s) orally once a day (30 Nov 2023 03:15)  ramipril 10 mg oral capsule: 1 cap(s) orally once a day (29 Nov 2023 06:37)  synthroid 450 mcg:  (29 Nov 2023 06:37)      VITALS:   T(F): 97.8 (11-30 @ 01:25), Max: 97.8 (11-30 @ 01:25)  HR: 65 (11-30 @ 05:17) (57 - 81)  BP: 131/67 (11-30 @ 05:17) (131/67 - 210/91)  BP(mean): --  RR: 18 (11-30 @ 05:17) (15 - 19)  SpO2: 98% (11-30 @ 05:17) (97% - 100%)    I&O's Summary      REVIEW OF SYSTEMS:  CONSTITUTIONAL: No weakness, fevers or chills  EYES: No visual changes  ENT: No vertigo or throat pain   NECK: No pain or stiffness  RESPIRATORY: No cough, wheezing, hemoptysis; No shortness of breath  CARDIOVASCULAR: No chest pain or palpitations  GASTROINTESTINAL: No abdominal or epigastric pain. No nausea, vomiting, or hematemesis; No diarrhea or constipation. No melena or hematochezia.  GENITOURINARY: No dysuria, frequency or hematuria  NEUROLOGICAL: No numbness or weakness  SKIN: No itching, no rashes  MSK: no    PHYSICAL EXAM:  NEURO: patient is awake , alert and oriented  GEN: Not in acute distress  NECK: no thyroid enlargement, no JVD  LUNGS: Clear to auscultation bilaterally   CARDIOVASCULAR: S1/S2 present, RRR , no murmurs or rubs, no carotid bruits,  + PP bilaterally  ABD: Soft, non-tender, non-distended, +BS  EXT: No TRENA  SKIN: Intact    LABS:                        12.4   5.67  )-----------( 199      ( 30 Nov 2023 07:52 )             37.6     11-30    142  |  104  |  15  ----------------------------<  91  4.7   |  31  |  1.0    Ca    9.4      30 Nov 2023 07:52  Phos  4.4     11-30  Mg     2.1     11-30    TPro  6.0  /  Alb  4.2  /  TBili  0.3  /  DBili  x   /  AST  23  /  ALT  20  /  AlkPhos  92  11-30    PT/INR - ( 30 Nov 2023 00:27 )   PT: 11.90 sec;   INR: 1.04 ratio         PTT - ( 30 Nov 2023 00:27 )  PTT:34.3 sec  Troponin T, Serum: <0.01 ng/mL (11-30-23 @ 07:52)  Troponin T, Serum: <0.01 ng/mL (11-30-23 @ 00:27)    CARDIAC MARKERS ( 30 Nov 2023 07:52 )  x     / <0.01 ng/mL / x     / x     / x      CARDIAC MARKERS ( 30 Nov 2023 00:27 )  x     / <0.01 ng/mL / x     / x     / x            Troponin trend:            RADIOLOGY:      ECG:    TELEMETRY EVENTS:

## 2023-11-30 NOTE — ED PROVIDER NOTE - CARE PLAN
1 Principal Discharge DX:	Chest pain  Secondary Diagnosis:	Atrial fibrillation status post cardioversion

## 2023-11-30 NOTE — CONSULT NOTE ADULT - NS ATTEND AMEND GEN_ALL_CORE FT
63 y/ f pmh Afib on Eliquis s/p successful DCCV yesterday htn, hld, hypothyroidism obesity, pettit's esophagitis  presents for evaluation of chest pain. Patient well to me. Pain at times worse swallowing. R/o mi. Patent to see me in 2 weeks. Told See GI

## 2023-11-30 NOTE — H&P ADULT - NSHPLABSRESULTS_GEN_ALL_CORE
LABS:                        12.8   6.76  )-----------( 205      ( 30 Nov 2023 00:27 )             38.4     11-30    140  |  102  |  15  ----------------------------<  90  3.7   |  28  |  1.1    Ca    9.4      30 Nov 2023 00:27  Mg     2.0     11-30    TPro  6.0  /  Alb  4.2  /  TBili  0.3  /  DBili  x   /  AST  23  /  ALT  20  /  AlkPhos  92  11-30    LIVER FUNCTIONS - ( 30 Nov 2023 00:27 )  Alb: 4.2 g/dL / Pro: 6.0 g/dL / ALK PHOS: 92 U/L / ALT: 20 U/L / AST: 23 U/L / GGT: x           PT/INR - ( 30 Nov 2023 00:27 )   PT: 11.90 sec;   INR: 1.04 ratio         PTT - ( 30 Nov 2023 00:27 )  PTT:34.3 sec  Urinalysis Basic - ( 30 Nov 2023 00:27 )    Color: x / Appearance: x / SG: x / pH: x  Gluc: 90 mg/dL / Ketone: x  / Bili: x / Urobili: x   Blood: x / Protein: x / Nitrite: x   Leuk Esterase: x / RBC: x / WBC x   Sq Epi: x / Non Sq Epi: x / Bacteria: x

## 2023-11-30 NOTE — ED ADULT NURSE NOTE - NSFALLRISKINTERV_ED_ALL_ED

## 2023-11-30 NOTE — DISCHARGE NOTE PROVIDER - NSDCFUSCHEDAPPT_GEN_ALL_CORE_FT
Beau Orozco  [facility]  [address]  Scheduled Appointment: 01/09/2024    Adrián Alex  [facility]  [address]  Scheduled Appointment: 01/11/2024

## 2023-11-30 NOTE — DISCHARGE NOTE PROVIDER - HOSPITAL COURSE
63 y/ f pmh Afib on Eliquis s/p successful DCCV yesterday htn, hld, hypothyroidism obesity, pettit's esophagitis  presents for evaluation of chest pain.    The patient was evaluated by Cardiology and determined to not have ACS or acute cardiac disease and therefore can be discharged. 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 Plan to increase PPIT to BID for nex 7-14d and will need patient to follow up with her PCP and GI for further management.

## 2023-11-30 NOTE — DISCHARGE NOTE NURSING/CASE MANAGEMENT/SOCIAL WORK - NSDCPEFALRISK_GEN_ALL_CORE
For information on Fall & Injury Prevention, visit: [email or web address] OR  [email or web address] OR  https://www.cdc.gov/steadi/patient.html

## 2023-11-30 NOTE — H&P ADULT - ASSESSMENT
Assessment:  Patient is a 62 y/o F with a pmhx of HLD, seizure disorder, hypothyroidism, A.fib (on eliquis)  s/p cardioversion with cardiologist Dr. Rudolph who presents for substernal chest pain that radiates to the neck that began this afternoon while watching TV.     Plan:      #Chest pain s/p cardioversion today.  Admit to inpatient level of care-telemetry.  Monitor labs and vital signs.  Cardiac monitoring.  Cardiology consulted.  Trend troponin  Continue with famotidine.    #A.fib  rate controlled.  Continue with bb and anticoagulant.     #HLD  Continue with statin.     #Seizure disorder   Continue with home medications.      Above discussed with Dr. Cao Assessment:  Patient is a 64 y/o F with a pmhx of HLD, seizure disorder, hypothyroidism, A.fib (on eliquis)  s/p cardioversion with cardiologist Dr. Rudolph who presents for substernal chest pain that radiates to the neck that began this afternoon while watching TV.     Plan:      #Chest pain s/p cardioversion today.  EKG shows NSR no ischemic changes.  trop negative x1  Admit to inpatient level of care-telemetry.  Monitor labs and vital signs.  Cardiac monitoring.  Cardiology consulted.  Trend troponin  Continue with famotidine.  TTE    #A.fib  rate controlled.  Continue with bb and anticoagulant.     #HLD  Continue with statin.     #Seizure disorder   Continue with home medications.      Above discussed with Dr. Cao    #Progress Note Handoff  Pending (specify):  as above   Family discussion:  plan of care was discussed with patient   in details.  all questions were answered.  seems to understand, and in agreement  Disposition:  home

## 2023-11-30 NOTE — CONSULT NOTE ADULT - ASSESSMENT
63 y/ f pmh Afib on Eliquis s/p successful DCCV yesterday htn, hld, hypothyroidism obesity, pettit's esophagitis  presents for evaluation of chest pain.    Impression  # Atypical chest pain likely GI in etiology; ACS ruled out  - CE neg x2  - ecg nsr non-ischemic  - Ambulate OOB  - GI eval  - If no further complaints then ok for dc from cardiac standpoint  - c/w home meds

## 2023-11-30 NOTE — DISCHARGE NOTE PROVIDER - NSDCCPCAREPLAN_GEN_ALL_CORE_FT
PRINCIPAL DISCHARGE DIAGNOSIS  Diagnosis: Atypical chest pain  Assessment and Plan of Treatment: You were evaluated by a cardiologist and deteremined to not have acute cardiac disease. 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 Follow up with your primary care doctor within 1 week of discharge and follow up with your gastroenterologist

## 2023-11-30 NOTE — DISCHARGE NOTE NURSING/CASE MANAGEMENT/SOCIAL WORK - NSDCVIVACCINE_GEN_ALL_CORE_FT
Tdap; 15-Jul-2022 21:00; Rachele Kincaid (RN); [employer]; [id number] (Exp. Date: 12-Jul-2024); IntraMuscular; Deltoid Left.; 0.5 milliLiter(s); VIS (VIS Published: 09-May-2013, VIS Presented: 15-Jul-2022);

## 2023-11-30 NOTE — DISCHARGE NOTE NURSING/CASE MANAGEMENT/SOCIAL WORK - PATIENT PORTAL LINK FT
You can access the FollowMyHealth Patient Portal offered by [facility] by registering at the following website: [email or web address]. By joining [facility]’s FollowMyHealth portal, you will also be able to view your health information using other applications (apps) compatible with our system.

## 2023-11-30 NOTE — H&P ADULT - NSHPPHYSICALEXAM_GEN_ALL_CORE
Vital Signs Last 24 Hrs  T(C): 36.6 (30 Nov 2023 01:25), Max: 36.6 (30 Nov 2023 01:25)  T(F): 97.8 (30 Nov 2023 01:25), Max: 97.8 (30 Nov 2023 01:25)  HR: 78 (30 Nov 2023 01:25) (57 - 81)  BP: 140/67 (30 Nov 2023 01:25) (132/71 - 210/91)  BP(mean): --  RR: 18 (30 Nov 2023 01:25) (15 - 19)  SpO2: 97% (30 Nov 2023 01:25) (97% - 100%)    Parameters below as of 30 Nov 2023 01:25  Patient On (Oxygen Delivery Method): room air    VITALS:     GENERAL: NAD, lying in bed comfortably  HEAD:  Atraumatic, Normocephalic  EYES: EOMI, PERRLA, conjunctiva and sclera clear  ENT: Moist mucous membranes  NECK: Supple, No JVD  CHEST/LUNG: Clear to auscultation bilaterally; No rales, rhonchi, wheezing, or rubs. Unlabored respirations  HEART: Regular rate and rhythm; No murmurs, rubs, or gallops  ABDOMEN: Bowel sounds present; Soft, Nontender, Nondistended. No hepatomegally  EXTREMITIES:  2+ Peripheral Pulses, brisk capillary refill. No clubbing, cyanosis, or edema  NERVOUS SYSTEM:  Alert & Oriented X3, speech clear. No deficits   MSK: FROM all 4 extremities, full and equal strength  SKIN: No rashes or lesions

## 2023-11-30 NOTE — ED ADULT NURSE NOTE - NSICDXPASTMEDICALHX_GEN_ALL_CORE_FT
PAST MEDICAL HISTORY:  Brain aneurysm     Brain tumor, recurrent     Cervical cancer     Chronic atrial fibrillation     H/O fibromyalgia     H/O vocal cord paralysis     Hypothyroid     Obesity     Seizures     Spine anomaly     Uterine cancer, sarcoma     Vertigo

## 2023-11-30 NOTE — H&P ADULT - HISTORY OF PRESENT ILLNESS
Patient is a 64 y/o F with a pmhx of HLD, seizure disorder, hypothyroidism, A.fib (on eliquis)  s/p cardioversion with cardiologist Dr. Rudolph who presents for substernal chest pain that radiates to the neck that began this afternoon while watching TV. Describes the pain as "stabbing" rating it a 10/10 in intensity that comes and goes. Denies fever, chills, nausea, vomiting, lower extremity edema, shortness of breath. Pain is aggravating with swallowing. Tried taking tums and famotidine without relief of symptoms . Reports the pain improved while in the ER, however reoccurs when swallowing.  Patient is a 64 y/o F with a pmhx of HLD, seizure disorder, hypothyroidism, A.fib (on eliquis)  s/p cardioversion with cardiologist Dr. Rudolph who presents for substernal chest pain that radiates to the neck that began this afternoon while watching TV. Describes the pain as "stabbing" rating it a 10/10 in intensity that comes and goes. Denies fever, chills, nausea, vomiting, lower extremity edema, shortness of breath. Pain is aggravating with swallowing. Tried taking tums and famotidine without relief of symptoms . Reports the pain improved while in the ER, however reoccurs when swallowing.   currently CP free

## 2023-11-30 NOTE — ED PROVIDER NOTE - OBJECTIVE STATEMENT
63 year old female multiple medical problems including atrial fibrillation status post ablation yesterday comes to emergency room for chest pain. patient states that the pain is sharp in nurture and goes from lower chest to upper chest. no shortness of breath. no nf/c.

## 2023-11-30 NOTE — DISCHARGE NOTE PROVIDER - ATTENDING DISCHARGE PHYSICAL EXAMINATION:
Vital Signs Last 24 Hrs  T(C): 36.6 (30 Nov 2023 01:25), Max: 36.6 (30 Nov 2023 01:25)  T(F): 97.8 (30 Nov 2023 01:25), Max: 97.8 (30 Nov 2023 01:25)  HR: 65 (30 Nov 2023 05:17) (65 - 78)  BP: 131/67 (30 Nov 2023 05:17) (131/67 - 210/91)  BP(mean): --  RR: 18 (30 Nov 2023 05:17) (18 - 19)  SpO2: 98% (30 Nov 2023 05:17) (97% - 98%)    Parameters below as of 30 Nov 2023 05:17  Patient On (Oxygen Delivery Method): room air        GENERAL: NAD  HEAD:  Atraumatic, Normocephalic  EYES: EOMI, PERRL, conjunctiva and sclera clear  ENMT: MMM, no angular cheilitis appreciated  NECK: Supple, trachea midline  Lung: normal work of breathing, cta b/l  Cardiovascular: S1&S2+, rrr, no m/r/g appreciated  ABDOMEN: soft, nt  : No cabrera catheter, no suprapubic pain to palpation  Neuro: Alert & follows commands, no flaccid paralysis in extremities appreciated  SKIN: warm and dry, no visible purulence in exposed areas

## 2023-11-30 NOTE — ED PROVIDER NOTE - CLINICAL SUMMARY MEDICAL DECISION MAKING FREE TEXT BOX
63-year-old female history of Johnson's esophagus, A-fib status post cardioversion this morning at 9 AM presents with chest pain since 6 PM from  xyphoid upward, at rest that resolved with Pepcid and Tums.  No shortness of breath no diaphoresis no nausea. follows dr alfaro,  nuclear  stress oct negative.   independent interpretation of EKG  -  nsr no stemi no acute ischemia  independent interpretation of CXR -  no ptx no opacity not wide  no water bottle heart  trop negative   pt still with pain .  admitted for ro acs.  Labs  were ordered and reviewed.  Imaging was ordered and reviewed by me.  Appropriate medications for patient's presenting complaints were ordered and effects were reassessed.  Patient's records (prior hospital, ED visit) were reviewed.  Additional history was obtained from family. Escalation to admission/observation was considered. Patient requires inpatient hospitalization - monitored setting.

## 2023-11-30 NOTE — DISCHARGE NOTE PROVIDER - PROVIDER RX CONTACT NUMBER
26- Called [facility] for a cosnult with neuro surgery for pt. subarachnoid/intraparenchymal hemorrhage. Catalina Kim  06/15/22 2600    1609- Alfredo NP with neuro called back to speak with Dr. Candelaria Arizmendi.       Catalina Kim  06/15/22 0432 [phone]

## 2023-11-30 NOTE — DISCHARGE NOTE PROVIDER - CARE PROVIDER_API CALL
Terrence Haynes  Internal Medicine  [address] [place] [address]  Phone: [phone]  Fax: [phone]  Follow Up Time:

## 2023-11-30 NOTE — DISCHARGE NOTE PROVIDER - NSDCMRMEDTOKEN_GEN_ALL_CORE_FT
apixaban 5 mg oral tablet: 1 tab(s) orally 2 times a day  atorvastatin 10 mg oral tablet: 1 tab(s) orally once a day  escitalopram 10 mg oral tablet: 1 tab(s) orally once a day  esomeprazole 40 mg oral delayed release capsule: 1 cap(s) orally 2 times a day MDD: 2 CAP  famotidine 40 mg oral tablet: 1 tab(s) orally once a day (at bedtime)  High Potency Vitamin D3 125 mcg (5000 intl units) oral capsule: 1 cap(s) orally once a day  levETIRAcetam 1000 mg oral tablet: 1.5 tab(s) orally 2 times a day  levothyroxine 300 mcg (0.3 mg) oral tablet: 1 tab(s) orally once a day  metoprolol succinate 25 mg oral capsule, extended release: 1 cap(s) orally once a day  Premarin 0.625 mg oral tablet: 1 tab(s) orally once a day  ramipril 10 mg oral capsule: 1 cap(s) orally once a day  sucralfate 1 g/10 mL oral suspension: 10 milliliter(s) orally 3 times a day as needed for  acid reflux MDD: 30mL

## 2023-12-04 DIAGNOSIS — I48.20 CHRONIC ATRIAL FIBRILLATION, UNSPECIFIED: ICD-10-CM

## 2023-12-04 DIAGNOSIS — E78.5 HYPERLIPIDEMIA, UNSPECIFIED: ICD-10-CM

## 2023-12-04 DIAGNOSIS — Z85.42 PERSONAL HISTORY OF MALIGNANT NEOPLASM OF OTHER PARTS OF UTERUS: ICD-10-CM

## 2023-12-04 DIAGNOSIS — K22.70 BARRETT'S ESOPHAGUS WITHOUT DYSPLASIA: ICD-10-CM

## 2023-12-04 DIAGNOSIS — I48.91 UNSPECIFIED ATRIAL FIBRILLATION: ICD-10-CM

## 2023-12-04 DIAGNOSIS — E03.9 HYPOTHYROIDISM, UNSPECIFIED: ICD-10-CM

## 2023-12-04 DIAGNOSIS — R07.89 OTHER CHEST PAIN: ICD-10-CM

## 2023-12-04 DIAGNOSIS — E66.9 OBESITY, UNSPECIFIED: ICD-10-CM

## 2023-12-04 DIAGNOSIS — Z85.41 PERSONAL HISTORY OF MALIGNANT NEOPLASM OF CERVIX UTERI: ICD-10-CM

## 2023-12-04 DIAGNOSIS — R56.9 UNSPECIFIED CONVULSIONS: ICD-10-CM

## 2023-12-04 DIAGNOSIS — Z79.01 LONG TERM (CURRENT) USE OF ANTICOAGULANTS: ICD-10-CM

## 2023-12-14 ENCOUNTER — APPOINTMENT (OUTPATIENT)
Dept: CARDIOLOGY | Facility: CLINIC | Age: 63
End: 2023-12-14
Payer: COMMERCIAL

## 2023-12-14 VITALS
HEART RATE: 97 BPM | BODY MASS INDEX: 34.1 KG/M2 | HEIGHT: 68 IN | DIASTOLIC BLOOD PRESSURE: 80 MMHG | OXYGEN SATURATION: 97 % | WEIGHT: 225 LBS | SYSTOLIC BLOOD PRESSURE: 138 MMHG

## 2023-12-14 DIAGNOSIS — M79.7 FIBROMYALGIA: ICD-10-CM

## 2023-12-14 DIAGNOSIS — R56.9 UNSPECIFIED CONVULSIONS: ICD-10-CM

## 2023-12-14 PROCEDURE — 93000 ELECTROCARDIOGRAM COMPLETE: CPT

## 2023-12-14 PROCEDURE — 99213 OFFICE O/P EST LOW 20 MIN: CPT | Mod: 25

## 2023-12-14 NOTE — REVIEW OF SYSTEMS
[Fever] : no fever [Chills] : no chills [Blurry Vision] : no blurred vision [Tinnitus] : tinnitus [SOB] : no shortness of breath [Chest Discomfort] : no chest discomfort [Palpitations] : palpitations [Cough] : no cough [Wheezing] : no wheezing [Abdominal Pain] : no abdominal pain [Diarrhea] : diarrhea [Dysuria] : no dysuria [Joint Pain] : joint pain [Myalgia] : no myalgia [Rash] : no rash [Skin Lesions] : no skin lesions [Dizziness] : no dizziness [Weakness] : no weakness [Negative] : Heme/Lymph [FreeTextEntry9] : elbow wirst knee pain

## 2023-12-14 NOTE — HISTORY OF PRESENT ILLNESS
[FreeTextEntry1] : 62 yo hx HTN HLD Brain Tumor.hypothyroid. She saw PMD Dr avelar found in afib. She denies chest pain or sob. She gets NUNEZ. She can have seizures at night. None in 3 mos.   She had covid 4/23. She does not exercise.

## 2023-12-14 NOTE — DISCUSSION/SUMMARY
[FreeTextEntry1] :  62 yo hx HTN HLD Brain Tumor. Hypothyroid. She last week saw PMD Dr avelar found in afib. She denies chest pain or sob. She gets NUNEZ. She can have seizures at night.  Not exercise.  MIRIAN vasc 2. Lexiscan 10/23 , No ischemia. Reviewed option cardioversion ablation. Also because seizure option watchman. Patient understands .Aware risk benefits AC. To See neuro.Cardioverted 11/23. She is considering ablation and watchman Appears in NSR .Will check ekg.

## 2023-12-23 NOTE — IMAGING
[de-identified] :  overweight still some spasm back limits in motion both knees have some swelling and crepitus limits in flexion pain on the instep of the right foot Patient Cr 3.47 on today's labs with normal renal us - will admit to [facility] hospitalist    Laura Arnold MD, PGY3 Patient Cr 3.47 on today's labs with normal renal us - will admit to [facility] hospitalist    Laura Arnold MD, PGY3

## 2024-01-07 PROBLEM — E03.9 HYPOTHYROIDISM, UNSPECIFIED TYPE: Status: ACTIVE | Noted: 2021-01-03

## 2024-01-07 PROBLEM — D35.2 PITUITARY MICROADENOMA: Status: ACTIVE | Noted: 2021-01-03

## 2024-01-07 NOTE — ASSESSMENT
[FreeTextEntry1] : Pt was in A Fib and saw Lefkovic with note reviewed with patient.  The patient has a history of hypothyroidism though currently is clinically euthyroid with no hypothyroid or hyperthyroid signs or symptoms. The patient's TSH, free T4 and free T3 were in the normal range. Risks of subclinical hyperthyroidism (BMD,cardio etc.) and hypothyroidism (fatigue, muscle aches, weight gain etc.) discussed in detail and given clinical euthyroid state after discussion pt. is comfortable with current dose.  Pt had h/o mild transaminase elevation and was told to discuss with primary MD. Values currently from 11/6/2023 were fine.  Prolactin value reviewed. Discussed issue of hyperprolactinemia including impact on bone density and symptoms related to elevation. The previous level was 7.1.  Pituitary MRI reviewed and implications of adenoma were thoroughly discussed. options for treatment were again reviewed. Will stay with current regimen. Lipid levels were reviewed with patient and importance and function of LDL, HDL and triglycerides discussed. Methods to increase HDL (exercise, fish, beans, oatmeal, legumes etc.) discussed with pt. in conjunction with measures to decrease LDL and triglycerides including diet and exercise.LDL/HDL was 96/60 from 116/66 & 153/60. Triglycerides were 111, current regimen of treatment with atorvastatin 10 mg t to continue. Risks/benefits of statins were discussed in detail.  Pt. has Impaired fasting glucose (116 from 100) and current HbA1c is 5.4. We have discussed diet/exercise and risks related to diabetes in great detail.  Pt. has had a low Vit D with previous 28 from 27. The importance of normal value and need for Vit D supplements of 5000 IU daily was discussed.

## 2024-01-09 ENCOUNTER — APPOINTMENT (OUTPATIENT)
Dept: ORTHOPEDIC SURGERY | Facility: CLINIC | Age: 64
End: 2024-01-09
Payer: COMMERCIAL

## 2024-01-09 DIAGNOSIS — M54.12 RADICULOPATHY, CERVICAL REGION: ICD-10-CM

## 2024-01-09 PROCEDURE — 99213 OFFICE O/P EST LOW 20 MIN: CPT

## 2024-01-11 ENCOUNTER — APPOINTMENT (OUTPATIENT)
Dept: ENDOCRINOLOGY | Facility: CLINIC | Age: 64
End: 2024-01-11
Payer: COMMERCIAL

## 2024-01-11 VITALS
HEART RATE: 82 BPM | DIASTOLIC BLOOD PRESSURE: 74 MMHG | HEIGHT: 68 IN | OXYGEN SATURATION: 98 % | SYSTOLIC BLOOD PRESSURE: 130 MMHG

## 2024-01-11 DIAGNOSIS — E03.9 HYPOTHYROIDISM, UNSPECIFIED: ICD-10-CM

## 2024-01-11 DIAGNOSIS — D35.2 BENIGN NEOPLASM OF PITUITARY GLAND: ICD-10-CM

## 2024-01-11 PROCEDURE — 99214 OFFICE O/P EST MOD 30 MIN: CPT

## 2024-01-24 ENCOUNTER — APPOINTMENT (OUTPATIENT)
Dept: ELECTROPHYSIOLOGY | Facility: CLINIC | Age: 64
End: 2024-01-24
Payer: COMMERCIAL

## 2024-01-24 VITALS
RESPIRATION RATE: 18 BRPM | DIASTOLIC BLOOD PRESSURE: 80 MMHG | HEART RATE: 79 BPM | WEIGHT: 225 LBS | SYSTOLIC BLOOD PRESSURE: 130 MMHG | HEIGHT: 68 IN | BODY MASS INDEX: 34.1 KG/M2 | TEMPERATURE: 97.1 F

## 2024-01-24 DIAGNOSIS — I48.91 UNSPECIFIED ATRIAL FIBRILLATION: ICD-10-CM

## 2024-01-24 PROCEDURE — 93000 ELECTROCARDIOGRAM COMPLETE: CPT

## 2024-01-24 PROCEDURE — 99205 OFFICE O/P NEW HI 60 MIN: CPT | Mod: 25

## 2024-01-24 RX ORDER — OXYCODONE AND ACETAMINOPHEN 5; 325 MG/1; MG/1
5-325 TABLET ORAL
Qty: 90 | Refills: 0 | Status: COMPLETED | COMMUNITY
Start: 2022-09-26 | End: 2024-01-24

## 2024-01-24 RX ORDER — OXYCODONE AND ACETAMINOPHEN 5; 325 MG/1; MG/1
5-325 TABLET ORAL
Qty: 90 | Refills: 0 | Status: COMPLETED | COMMUNITY
Start: 2022-08-22 | End: 2024-01-24

## 2024-01-24 RX ORDER — AMOXICILLIN 500 MG/1
500 CAPSULE ORAL 3 TIMES DAILY
Qty: 30 | Refills: 1 | Status: COMPLETED | COMMUNITY
Start: 2023-06-08 | End: 2024-01-24

## 2024-01-24 RX ORDER — OXYCODONE AND ACETAMINOPHEN 5; 325 MG/1; MG/1
5-325 TABLET ORAL
Qty: 90 | Refills: 0 | Status: COMPLETED | COMMUNITY
Start: 2023-01-29 | End: 2024-01-24

## 2024-01-24 NOTE — PHYSICAL EXAM
[Well Developed] : well developed [Well Nourished] : well nourished [No Acute Distress] : no acute distress [Obese] : obese [Normal Conjunctiva] : normal conjunctiva [Normal Venous Pressure] : normal venous pressure [Normal S1, S2] : normal S1, S2 [No Murmur] : no murmur [Clear Lung Fields] : clear lung fields [Good Air Entry] : good air entry [No Respiratory Distress] : no respiratory distress  [Soft] : abdomen soft [Normal Gait] : normal gait [No Edema] : no edema [No Rash] : no rash [Moves all extremities] : moves all extremities [Normal Speech] : normal speech [Alert and Oriented] : alert and oriented [de-identified] : irregular rate and rhythm

## 2024-01-24 NOTE — CARDIOLOGY SUMMARY
[de-identified] : 1/24/2024 AFib with cont VR (HR 82 bpm) [de-identified] : Lexiscan 10/2023, No ischemia. [de-identified] : 11/14/2023 LVEF 63% Mild LAE (38 ml/m2 ml/m2). mild MR. Mild AI. Mild TR.

## 2024-01-24 NOTE — ASSESSMENT
[FreeTextEntry1] : # Recurrent AFib - Cont Eliquis 5mg PO BID for CHADs Vasc 4 (HTN, F, TIA).  - s/p cardioversion 11/23. Went back into AF. Discussed AAD vs ablation. patient prefers not to be on more meds. Will plan for AF ablation 2/15/2024. Will plan NITZA occlusion after, likely Amulet.  Dr. Rudolph in agreement with this plan. CT to assess NITZA prior to occlusion.  - Denies sleep apnea symptoms.   # HTN - BP well controlled - Cont Metoprolol Succinate ER 25 mg daily and Ramipril 10 mg daily - 2g Na diet enforced  I have also advised the patient to go to the nearest emergency room if s/he experiences any chest pain, dyspnea, syncope, or has any other compelling symptoms.    I have also advised the patient to go to the nearest emergency room if she experiences any chest pain, dyspnea, syncope, or has any other compelling symptoms.    Follow up 2-4 weeks after ablation.

## 2024-01-24 NOTE — HISTORY OF PRESENT ILLNESS
[FreeTextEntry1] :  Cardio: Dr. Rudolph PCP: Dr. Haynes  62 yo F with h/o HTN, HL, brain tumor, hypothyroidism with recent diagnosis of AFib. 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 Had COVID 4/23. Can have seizures at night. Here for evaluation of NITZA occlusion device.   Orthostatics Lying Down /80; HR 71  Sitting Up    /78; HR 80 Standing     /80; HR 90

## 2024-01-24 NOTE — DISCUSSION/SUMMARY
[EKG obtained to assist in diagnosis and management of assessed problem(s)] : EKG obtained to assist in diagnosis and management of assessed problem(s) [FreeTextEntry1] : We had an extensive conversation regarding the nature of atrial fibrillation, including potential etiologies, underlying pathophysiology and natural history of the disease. In addition, the potential risk of thromboembolic events and assessment of that risk were discussed. I have emphasized the importance of continuing anticoagulation.   In addition, the maintenance of sinus rhythm along with adjuvant antiarrhythmic agents and catheter ablation therapy were discussed. The rationale for and risks of ablation therapy were discussed, including but not limited to bleeding, vascular injury, groin complications, cardiac perforation and tamponade, stroke, esophageal injury, pulmonary vein stenosis, need for pacemaker, need for cardiac surgery, and death. In addition, the long-term and ongoing nature of this therapy were also discussed, including the critical role of continued monitoring post-ablation and the potential for the necessity of repeat ablation procedures to definitively treat the condition.   The patient verbalized understanding of the discussion and all questions were addressed and answered. The patient would like to proceed with ablation.

## 2024-02-01 ENCOUNTER — RESULT REVIEW (OUTPATIENT)
Age: 64
End: 2024-02-01

## 2024-02-01 ENCOUNTER — OUTPATIENT (OUTPATIENT)
Dept: OUTPATIENT SERVICES | Facility: HOSPITAL | Age: 64
LOS: 1 days | End: 2024-02-01
Payer: COMMERCIAL

## 2024-02-01 VITALS
HEART RATE: 89 BPM | WEIGHT: 225.09 LBS | OXYGEN SATURATION: 99 % | SYSTOLIC BLOOD PRESSURE: 144 MMHG | RESPIRATION RATE: 18 BRPM | DIASTOLIC BLOOD PRESSURE: 87 MMHG | TEMPERATURE: 98 F | HEIGHT: 68 IN

## 2024-02-01 DIAGNOSIS — I48.19 OTHER PERSISTENT ATRIAL FIBRILLATION: ICD-10-CM

## 2024-02-01 DIAGNOSIS — Z98.1 ARTHRODESIS STATUS: Chronic | ICD-10-CM

## 2024-02-01 DIAGNOSIS — Z01.818 ENCOUNTER FOR OTHER PREPROCEDURAL EXAMINATION: ICD-10-CM

## 2024-02-01 DIAGNOSIS — Z98.890 OTHER SPECIFIED POSTPROCEDURAL STATES: Chronic | ICD-10-CM

## 2024-02-01 DIAGNOSIS — N91.2 AMENORRHEA, UNSPECIFIED: Chronic | ICD-10-CM

## 2024-02-01 DIAGNOSIS — Z92.89 PERSONAL HISTORY OF OTHER MEDICAL TREATMENT: Chronic | ICD-10-CM

## 2024-02-01 LAB
ALBUMIN SERPL ELPH-MCNC: 4.3 G/DL — SIGNIFICANT CHANGE UP (ref 3.5–5.2)
ALP SERPL-CCNC: 92 U/L — SIGNIFICANT CHANGE UP (ref 30–115)
ALT FLD-CCNC: 19 U/L — SIGNIFICANT CHANGE UP (ref 0–41)
ANION GAP SERPL CALC-SCNC: 14 MMOL/L — SIGNIFICANT CHANGE UP (ref 7–14)
AST SERPL-CCNC: 22 U/L — SIGNIFICANT CHANGE UP (ref 0–41)
BASOPHILS # BLD AUTO: 0.08 K/UL — SIGNIFICANT CHANGE UP (ref 0–0.2)
BASOPHILS NFR BLD AUTO: 1.2 % — HIGH (ref 0–1)
BILIRUB SERPL-MCNC: 0.3 MG/DL — SIGNIFICANT CHANGE UP (ref 0.2–1.2)
BLD GP AB SCN SERPL QL: SIGNIFICANT CHANGE UP
BUN SERPL-MCNC: 11 MG/DL — SIGNIFICANT CHANGE UP (ref 10–20)
CALCIUM SERPL-MCNC: 9.5 MG/DL — SIGNIFICANT CHANGE UP (ref 8.4–10.5)
CHLORIDE SERPL-SCNC: 100 MMOL/L — SIGNIFICANT CHANGE UP (ref 98–110)
CO2 SERPL-SCNC: 29 MMOL/L — SIGNIFICANT CHANGE UP (ref 17–32)
CREAT SERPL-MCNC: 0.9 MG/DL — SIGNIFICANT CHANGE UP (ref 0.7–1.5)
EGFR: 71 ML/MIN/1.73M2 — SIGNIFICANT CHANGE UP
EOSINOPHIL # BLD AUTO: 0.17 K/UL — SIGNIFICANT CHANGE UP (ref 0–0.7)
EOSINOPHIL NFR BLD AUTO: 2.6 % — SIGNIFICANT CHANGE UP (ref 0–8)
GLUCOSE SERPL-MCNC: 95 MG/DL — SIGNIFICANT CHANGE UP (ref 70–99)
HCT VFR BLD CALC: 42.4 % — SIGNIFICANT CHANGE UP (ref 37–47)
HGB BLD-MCNC: 13.8 G/DL — SIGNIFICANT CHANGE UP (ref 12–16)
IMM GRANULOCYTES NFR BLD AUTO: 0.2 % — SIGNIFICANT CHANGE UP (ref 0.1–0.3)
LYMPHOCYTES # BLD AUTO: 1.9 K/UL — SIGNIFICANT CHANGE UP (ref 1.2–3.4)
LYMPHOCYTES # BLD AUTO: 28.8 % — SIGNIFICANT CHANGE UP (ref 20.5–51.1)
MCHC RBC-ENTMCNC: 30.2 PG — SIGNIFICANT CHANGE UP (ref 27–31)
MCHC RBC-ENTMCNC: 32.5 G/DL — SIGNIFICANT CHANGE UP (ref 32–37)
MCV RBC AUTO: 92.8 FL — SIGNIFICANT CHANGE UP (ref 81–99)
MONOCYTES # BLD AUTO: 0.61 K/UL — HIGH (ref 0.1–0.6)
MONOCYTES NFR BLD AUTO: 9.2 % — SIGNIFICANT CHANGE UP (ref 1.7–9.3)
NEUTROPHILS # BLD AUTO: 3.83 K/UL — SIGNIFICANT CHANGE UP (ref 1.4–6.5)
NEUTROPHILS NFR BLD AUTO: 58 % — SIGNIFICANT CHANGE UP (ref 42.2–75.2)
NRBC # BLD: 0 /100 WBCS — SIGNIFICANT CHANGE UP (ref 0–0)
PLATELET # BLD AUTO: 215 K/UL — SIGNIFICANT CHANGE UP (ref 130–400)
PMV BLD: 11.6 FL — HIGH (ref 7.4–10.4)
POTASSIUM SERPL-MCNC: 4 MMOL/L — SIGNIFICANT CHANGE UP (ref 3.5–5)
POTASSIUM SERPL-SCNC: 4 MMOL/L — SIGNIFICANT CHANGE UP (ref 3.5–5)
PROT SERPL-MCNC: 7.2 G/DL — SIGNIFICANT CHANGE UP (ref 6–8)
RBC # BLD: 4.57 M/UL — SIGNIFICANT CHANGE UP (ref 4.2–5.4)
RBC # FLD: 13.2 % — SIGNIFICANT CHANGE UP (ref 11.5–14.5)
SODIUM SERPL-SCNC: 143 MMOL/L — SIGNIFICANT CHANGE UP (ref 135–146)
WBC # BLD: 6.6 K/UL — SIGNIFICANT CHANGE UP (ref 4.8–10.8)
WBC # FLD AUTO: 6.6 K/UL — SIGNIFICANT CHANGE UP (ref 4.8–10.8)

## 2024-02-01 PROCEDURE — 86901 BLOOD TYPING SEROLOGIC RH(D): CPT

## 2024-02-01 PROCEDURE — 36415 COLL VENOUS BLD VENIPUNCTURE: CPT

## 2024-02-01 PROCEDURE — 86900 BLOOD TYPING SEROLOGIC ABO: CPT

## 2024-02-01 PROCEDURE — 71046 X-RAY EXAM CHEST 2 VIEWS: CPT

## 2024-02-01 PROCEDURE — 86850 RBC ANTIBODY SCREEN: CPT

## 2024-02-01 PROCEDURE — 71046 X-RAY EXAM CHEST 2 VIEWS: CPT | Mod: 26

## 2024-02-01 PROCEDURE — 80053 COMPREHEN METABOLIC PANEL: CPT

## 2024-02-01 PROCEDURE — 85025 COMPLETE CBC W/AUTO DIFF WBC: CPT

## 2024-02-01 PROCEDURE — 99214 OFFICE O/P EST MOD 30 MIN: CPT | Mod: 25

## 2024-02-01 NOTE — H&P PST ADULT - NSICDXPASTSURGICALHX_GEN_ALL_CORE_FT
PAST SURGICAL HISTORY:  H/O abdominal hysterectomy     H/O cerebral aneurysm repair     H/O Spinal surgery tumor removed plates in neck    History of cardioversion     History of fusion of cervical spine

## 2024-02-01 NOTE — H&P PST ADULT - NSANTHOSAYNRD_GEN_A_CORE
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 KRISTIAN screening performed.  STOP BANG Legend: 0-2 = LOW Risk; 3-4 = INTERMEDIATE Risk; 5-8 = HIGH Risk

## 2024-02-01 NOTE — H&P PST ADULT - REASON FOR ADMISSION
64 Y/O F WITH PMHX HTN, HLD, SEIZURES (LAST 12/2023), BRAIN TUMOR, HYPOTHYROID, CERVICAL CANCER/OVARIAN CANCER, TIA X 7 SCHEDULED FOR PAST FOR AF ABLATION JACQUI CRYO UNDER [place] WITH DR SERVIN ON 2/15/24. PT WITH NEW ONSET AFIB S/P CARDIOVERSION IN DEC 2023 BACK IN AFIB 3 WEEKS POST CARDIOVERSION.

## 2024-02-01 NOTE — H&P PST ADULT - HISTORY OF PRESENT ILLNESS
PATIENT CURRENTLY DENIES CHEST PAIN  SHORTNESS OF BREATH  PALPITATIONS,  DYSURIA, OR UPPER RESPIRATORY INFECTION IN PAST 2 WEEKS      Denies travel outside the [place] in the past 30 days  Patient denies any signs or symptoms of COVID 19 and denies contact with known positive individuals.         Anesthesia Alert  NO--Difficult Airway  NO--History of neck surgery or radiation  NO--Limited ROM of neck  NO--History of Malignant hyperthermia  NO--No personal or family history of Pseudocholinesterase deficiency.  NO--Prior Anesthesia Complication  NO--Latex Allergy  NO--Loose teeth  NO--History of Rheumatoid Arthritis  NO--Bleeding risk  NO--KRISTIAN  NO--Other_____  CLASS III    Revised Cardiac Risk Index for Pre-Operative Risk   RESULT SUMMARY:  0 points  Class I Risk    3.9 %  30-day risk of death, MI, or cardiac arrest    INPUTS:  Elevated-risk surgery —> 0 = No  History of ischemic heart disease —> 0 = No  History of congestive heart failure —> 0 = No  History of cerebrovascular disease —> 0 = No  Pre-operative treatment with insulin —> 0 = No  Pre-operative creatinine >2 mg/dL / 176.8 µmol/L —> 0 = No      Duke Activity Status Index (DASI)   RESULT SUMMARY:  26.7 points  The higher the score (maximum 58.2), the higher the functional status.    6.02 METs    INPUTS:  Take care of self —> 2.75 = Yes  Walk indoors —> 1.75 = Yes  Walk 1&ndash;2 blocks on level ground —> 2.75 = Yes  Climb a flight of stairs or walk up a hill —> 0 = No  Run a short distance —> 0 = No  Do light work around the house —> 2.7 = Yes  Do moderate work around the house —> 3.5 = Yes  Do heavy work around the house —> 8 = Yes  Do yardwork —> 0 = No  Have sexual relations —> 5.25 = Yes  Participate in moderate recreational activities —> 0 = No  Participate in strenuous sports —> 0 = No      PT DENIES ANY RASHES, ABRASION, OR OPEN WOUNDS OR CUTS    AS PER THE PT, THIS IS HIS/HER COMPLETE MEDICAL AND SURGICAL HX, INCLUDING MEDICATIONS PRESCRIBED AND OVER THE COUNTER    Patient verbalized understanding of instructions and was given the opportunity to ask questions and have them answered.    pt denies any suicidal ideation or thoughts, pt states not a threat to self or others

## 2024-02-01 NOTE — H&P PST ADULT - NSICDXPASTMEDICALHX_GEN_ALL_CORE_FT
PAST MEDICAL HISTORY:  AVM (arteriovenous malformation)     Brain aneurysm     Brain tumor, recurrent     Cervical cancer     Chronic atrial fibrillation     H/O fibromyalgia     H/O vocal cord paralysis     Hiatal hernia with GERD     History of Johnson's esophagus     Hypothyroid     Obesity     Seizures     Spine anomaly     TIA (transient ischemic attack)     Uterine cancer, sarcoma     Vertigo

## 2024-02-02 DIAGNOSIS — I48.19 OTHER PERSISTENT ATRIAL FIBRILLATION: ICD-10-CM

## 2024-02-02 DIAGNOSIS — Z01.818 ENCOUNTER FOR OTHER PREPROCEDURAL EXAMINATION: ICD-10-CM

## 2024-02-15 ENCOUNTER — INPATIENT (INPATIENT)
Facility: HOSPITAL | Age: 64
LOS: 0 days | Discharge: HOME | DRG: 274 | End: 2024-02-16
Attending: STUDENT IN AN ORGANIZED HEALTH CARE EDUCATION/TRAINING PROGRAM | Admitting: STUDENT IN AN ORGANIZED HEALTH CARE EDUCATION/TRAINING PROGRAM
Payer: COMMERCIAL

## 2024-02-15 ENCOUNTER — APPOINTMENT (OUTPATIENT)
Dept: ELECTROPHYSIOLOGY | Facility: HOSPITAL | Age: 64
End: 2024-02-15

## 2024-02-15 ENCOUNTER — TRANSCRIPTION ENCOUNTER (OUTPATIENT)
Age: 64
End: 2024-02-15

## 2024-02-15 VITALS
SYSTOLIC BLOOD PRESSURE: 147 MMHG | WEIGHT: 224.87 LBS | DIASTOLIC BLOOD PRESSURE: 78 MMHG | TEMPERATURE: 98 F | OXYGEN SATURATION: 98 % | HEART RATE: 96 BPM | RESPIRATION RATE: 18 BRPM | HEIGHT: 68 IN

## 2024-02-15 DIAGNOSIS — Z98.1 ARTHRODESIS STATUS: Chronic | ICD-10-CM

## 2024-02-15 DIAGNOSIS — Z92.89 PERSONAL HISTORY OF OTHER MEDICAL TREATMENT: Chronic | ICD-10-CM

## 2024-02-15 DIAGNOSIS — I48.19 OTHER PERSISTENT ATRIAL FIBRILLATION: ICD-10-CM

## 2024-02-15 DIAGNOSIS — Z98.890 OTHER SPECIFIED POSTPROCEDURAL STATES: Chronic | ICD-10-CM

## 2024-02-15 PROCEDURE — C9113: CPT

## 2024-02-15 PROCEDURE — C9399: CPT

## 2024-02-15 PROCEDURE — 93656 COMPRE EP EVAL ABLTJ ATR FIB: CPT

## 2024-02-15 PROCEDURE — 93005 ELECTROCARDIOGRAM TRACING: CPT

## 2024-02-15 RX ORDER — ACETAMINOPHEN 500 MG
650 TABLET ORAL EVERY 6 HOURS
Refills: 0 | Status: DISCONTINUED | OUTPATIENT
Start: 2024-02-15 | End: 2024-02-16

## 2024-02-15 RX ORDER — SUCRALFATE 1 G
1 TABLET ORAL
Refills: 0 | Status: DISCONTINUED | OUTPATIENT
Start: 2024-02-15 | End: 2024-02-16

## 2024-02-15 RX ORDER — PANTOPRAZOLE SODIUM 20 MG/1
40 TABLET, DELAYED RELEASE ORAL
Refills: 0 | Status: DISCONTINUED | OUTPATIENT
Start: 2024-02-15 | End: 2024-02-16

## 2024-02-15 RX ORDER — APIXABAN 2.5 MG/1
5 TABLET, FILM COATED ORAL EVERY 12 HOURS
Refills: 0 | Status: DISCONTINUED | OUTPATIENT
Start: 2024-02-15 | End: 2024-02-16

## 2024-02-15 RX ORDER — ATORVASTATIN CALCIUM 80 MG/1
10 TABLET, FILM COATED ORAL AT BEDTIME
Refills: 0 | Status: DISCONTINUED | OUTPATIENT
Start: 2024-02-15 | End: 2024-02-16

## 2024-02-15 RX ORDER — PANTOPRAZOLE SODIUM 20 MG/1
40 TABLET, DELAYED RELEASE ORAL ONCE
Refills: 0 | Status: COMPLETED | OUTPATIENT
Start: 2024-02-15 | End: 2024-02-15

## 2024-02-15 RX ORDER — ACETAMINOPHEN 500 MG
1000 TABLET ORAL ONCE
Refills: 0 | Status: COMPLETED | OUTPATIENT
Start: 2024-02-15 | End: 2024-02-15

## 2024-02-15 RX ORDER — FAMOTIDINE 10 MG/ML
40 INJECTION INTRAVENOUS AT BEDTIME
Refills: 0 | Status: DISCONTINUED | OUTPATIENT
Start: 2024-02-15 | End: 2024-02-16

## 2024-02-15 RX ORDER — ESCITALOPRAM OXALATE 10 MG/1
10 TABLET, FILM COATED ORAL DAILY
Refills: 0 | Status: DISCONTINUED | OUTPATIENT
Start: 2024-02-15 | End: 2024-02-16

## 2024-02-15 RX ORDER — LANOLIN ALCOHOL/MO/W.PET/CERES
5 CREAM (GRAM) TOPICAL AT BEDTIME
Refills: 0 | Status: DISCONTINUED | OUTPATIENT
Start: 2024-02-15 | End: 2024-02-16

## 2024-02-15 RX ORDER — METOPROLOL TARTRATE 50 MG
25 TABLET ORAL DAILY
Refills: 0 | Status: DISCONTINUED | OUTPATIENT
Start: 2024-02-15 | End: 2024-02-16

## 2024-02-15 RX ORDER — CHOLECALCIFEROL (VITAMIN D3) 125 MCG
5000 CAPSULE ORAL DAILY
Refills: 0 | Status: DISCONTINUED | OUTPATIENT
Start: 2024-02-15 | End: 2024-02-16

## 2024-02-15 RX ORDER — LEVOTHYROXINE SODIUM 125 MCG
300 TABLET ORAL DAILY
Refills: 0 | Status: DISCONTINUED | OUTPATIENT
Start: 2024-02-15 | End: 2024-02-16

## 2024-02-15 RX ORDER — INFLUENZA VIRUS VACCINE 15; 15; 15; 15 UG/.5ML; UG/.5ML; UG/.5ML; UG/.5ML
0.5 SUSPENSION INTRAMUSCULAR ONCE
Refills: 0 | Status: DISCONTINUED | OUTPATIENT
Start: 2024-02-15 | End: 2024-02-16

## 2024-02-15 RX ORDER — LEVETIRACETAM 250 MG/1
1500 TABLET, FILM COATED ORAL
Refills: 0 | Status: DISCONTINUED | OUTPATIENT
Start: 2024-02-15 | End: 2024-02-16

## 2024-02-15 RX ORDER — LISINOPRIL 2.5 MG/1
40 TABLET ORAL DAILY
Refills: 0 | Status: DISCONTINUED | OUTPATIENT
Start: 2024-02-15 | End: 2024-02-16

## 2024-02-15 RX ADMIN — LEVETIRACETAM 1500 MILLIGRAM(S): 250 TABLET, FILM COATED ORAL at 18:38

## 2024-02-15 RX ADMIN — Medication 400 MILLIGRAM(S): at 17:42

## 2024-02-15 RX ADMIN — PANTOPRAZOLE SODIUM 40 MILLIGRAM(S): 20 TABLET, DELAYED RELEASE ORAL at 16:56

## 2024-02-15 RX ADMIN — Medication 5 MILLIGRAM(S): at 21:08

## 2024-02-15 RX ADMIN — Medication 1 GRAM(S): at 17:44

## 2024-02-15 RX ADMIN — Medication 1 GRAM(S): at 23:37

## 2024-02-15 RX ADMIN — FAMOTIDINE 40 MILLIGRAM(S): 10 INJECTION INTRAVENOUS at 21:08

## 2024-02-15 RX ADMIN — ATORVASTATIN CALCIUM 10 MILLIGRAM(S): 80 TABLET, FILM COATED ORAL at 21:08

## 2024-02-15 RX ADMIN — APIXABAN 5 MILLIGRAM(S): 2.5 TABLET, FILM COATED ORAL at 17:43

## 2024-02-15 RX ADMIN — Medication 650 MILLIGRAM(S): at 23:45

## 2024-02-15 NOTE — DISCHARGE NOTE PROVIDER - NSDCMRMEDTOKEN_GEN_ALL_CORE_FT
apixaban 5 mg oral tablet: 1 tab(s) orally 2 times a day  atorvastatin 10 mg oral tablet: 1 tab(s) orally once a day  escitalopram 10 mg oral tablet: 1 tab(s) orally once a day  esomeprazole 40 mg oral delayed release capsule: 1 cap(s) orally 2 times a day MDD: 2 CAP  famotidine 40 mg oral tablet: 1 tab(s) orally once a day (at bedtime)  High Potency Vitamin D3 125 mcg (5000 intl units) oral capsule: 1 cap(s) orally once a day  levETIRAcetam 1000 mg oral tablet: 1.5 tab(s) orally 2 times a day  levothyroxine 300 mcg (0.3 mg) oral tablet: 1 tab(s) orally once a day  metoprolol succinate 25 mg oral capsule, extended release: 1 cap(s) orally once a day  Percocet 5 mg-325 mg oral tablet: 1 tab(s) orally 3 times a day as needed for  moderate pain  Premarin 0.625 mg oral tablet: 1 tab(s) orally once a day  ramipril 10 mg oral capsule: 1 cap(s) orally once a day   apixaban 5 mg oral tablet: 1 tab(s) orally every 12 hours  atorvastatin 10 mg oral tablet: 1 tab(s) orally once a day (at bedtime)  cholecalciferol oral tablet: 5000 unit(s) orally once a day  escitalopram 10 mg oral tablet: 1 tab(s) orally once a day  levETIRAcetam 750 mg oral tablet: 2 tab(s) orally 2 times a day  levothyroxine 300 mcg (0.3 mg) oral tablet: 1 tab(s) orally once a day  metoprolol succinate 25 mg oral tablet, extended release: 1 tab(s) orally once a day  pantoprazole 40 mg oral delayed release tablet: 1 tab(s) orally once a day (before a meal)  Premarin 0.625 mg oral tablet: 1 tab(s) orally once a day  ramipril 10 mg oral capsule: 1 cap(s) orally once a day  sucralfate 1 g oral tablet: 1 tab(s) orally 4 times a day

## 2024-02-15 NOTE — PATIENT PROFILE ADULT - DOES PATIENT HAVE ADVANCE DIRECTIVE
Subjective     REASON FOR CONSULTATION:1. High grade, invasive ductal carcinoma of right breast, ER/OK+, Her2 negative; clinical T1bN0, anatomic stage IA, prognostic stage IA  · May 28, 2019 right lumpectomy with removal of nipple areolar complex and right sentinel lymph node biopsy  -Central 2:00, invasive ductal carcinoma, grade 2, Elvin score of 6, tumor size is 10 mm, no DCIS, one lymph node negative, good margins    2.  Oncotype DX score of 6, low risk, no chemo necessary    3.  June 28, 2019: Started tamoxifen    4.  Family history of breast cancer, CHEK2  Positive  · Patient's cousin had breast cancer at age 40 and her aunt had breast cancer her paternal aunt  · CHEK2 usually is associated with 15 to 40% risk of breast cancer  · Will need MRI breast alternating with mammogram after age 39    5.  S/p hysterectomy with bilateral salpingo-oophorectomy September 4, 2020 by Dr. Nery Neumann.  Patient had bilateral ovarian cysts.  Pathology was consistent with complex atypical endometrial hyperplasia, endometrial polyp otherwise unremarkable.  Acute on chronic cervicitis with squamous metaplasia.  Right ovary with hemorrhagic corpus luteum cyst and left ovary also with corpus luteum cyst pelvic peritoneal biopsy negative for malignancy omental biopsy negative for malignancy.    · Patient switched to aromatase inhibitor from tamoxifen.  · November 2, 2020: Started Arimidex    HISTORY OF PRESENT ILLNESS:   Patient is a 50-year-old female with history of T1 be N0 invasive ductal carcinoma ER/OK positive HER-2 negative.  She also has CHEK2 mutation.  She underwent hysterectomy with bilateral salpingo-oophorectomy which was done by Dr. Nery Neumann.  Initially was started on tamoxifen but after surgery and bilateral oophorectomy she has been started on Arimidex.  She has mild hot flashes but does not have any joint pains.  She has been on Arimidex since September 2020.  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  She has been vaccinated.  She is scheduled for MRI of the breast in September 2020 following which she will see Danny ZHANG .     Patient's bone density is normal.  She had elevated calcium secondary to hydrochlorothiazide and that has been discontinued.  Her calcium was down to 10.1 last time.  She is taking vitamin D supplements and her vitamin D level was low at 21 and have asked her to take 2000 units daily.  Given that hydrochlorothiazide has been discontinued and her calcium normalized she can take 600 mg of calcium once daily.  Encouraged her to take increased fluids only because she had a remote history of kidney stone.    Oncologic history:   The patient is a 50 y.o. year old female who is here for an opinion about the above issue.    Patient is a 48-year old female who presents today as a new patient for consultation on her newly diagnosed high grade, invasive ductal carcinoma of right breast.   Patient had seen Dr. Greene on May 14, 2019.    She underwent a screening mammogram but did not appreciate any mass herself and denied any breast pain or nipple discharge.    Patient is accompanied with her wife Trena.  They have been together for 19 years and  for the last 2 years.    April 18, 2019: Screening mammogram showed a 10 mm small oval mass in the subareolar region of the right breast.  Left breast was negative.  Spot compression and ultrasound recommended.  Of the right breast.    April 29, 2019: Right diagnostic mammogram showed a high density round mass 10 mm with indistinct margins in the anterior one third subareolar region of the right breast located to centimeters from the nipple.    April 29, 2019: Ultrasound suggested a solid mass with indistinct margins 9 mm and subareolar region.  There is abnormal axillary node in the right axilla.  There are 2 adjacent lymph nodes in the right axilla with mildly prominent cortices.  The left axilla was evaluated for  comparison and the nodes on the right are of greater cortical thickness.      Ultrasound-guided biopsy of the breast mass and right axillary lymph node was suggested.     5/9/19:  US guided biopsy sent to [facility] lab  showing solid mass in the sub-areorolar region of right breast revealing invasive high-grade mammary carcinoma, grade 3. Tubule score 3, nuclear grade 3, and mitotic score 2 for a total score of 8. Neoplasm is present in 4/8 biopsies and measures 4 mm in greatest dimension.   Biopsy performed on 05/09/2019 of prominent lymph node of right axilla was benign- negative for metastatic carcinoma.  ER: 82.35%,  positive  SD: 92.68%, positive  HER-2/latoya: 1+ negative  Ki-67 14.81%       05/09/2019 MRI of bilateral breast:   IMPRESSION:  1. Biopsy-proven malignancy in the right breast in the anterior  one-third approximately 1.8 cm from the nipple at the 2:30 position with  an internal metallic clip. The mass measures on the order of 1 cm in  greatest dimension. No other suspicious findings are seen within the  right breast and there is no evidence for right axillary or internal  mammary chain adenopathy. The patient appears to be a candidate for  breast conservation therapy.  2. There are no findings suspicious for malignancy in the left breast.  BI-RADS Category 6: Known biopsy-proven malignancy.    5/14/19: Patient was seen by Dr. Greene who felt that she was a good candidate for lumpectomy given the size of the lesion related to her breast size.  Because of superficial subareolar location she would require removal of the nipple areolar complex in order to make sure she has negative margins.  Patient preferred breast conservation with central lumpectomy.  This included the removal of the nipple areolar complex.      Also obtained INVITAE  genetic test given her family history of malignancy.    Estrogen receptor: positive (82.35% moderate),   Progesterone receptor:  positive (92.68%, strong)   HER2/Latoya: negative  (IHC score1+) and Ki-67=14.8%  Pathologic Stage: pT1b, (sn)N0 (G2).    Synoptic Report :  TUMOR  Tumor Site: Invasive Carcinoma Central  Clock Position of Tumor Site 2 o'clock  3 o'clock  Histologic Type Invasive carcinoma of no special type (ductal, not otherwise  specified)  Glandular (Acinar) / Tubular Differentiation Score 2  Nuclear Pleomorphism Score 2  Mitotic Rate Score 2 (4-7 mitoses per mm2)  Number of Mitoses per 10 High-Power Fields 15 mitotic figures  Diameter of Microscope Field in Millimeters (mm) 0.55 Millimeters (mm)  Overall Grade Grade 2 (scores of 6 or 7)  Tumor Size Greatest dimension of largest invasive focus in Millimeters (mm): 10  Millimeters (mm)    MARGINS  Invasive Carcinoma Margins Uninvolved by invasive carcinoma    LYMPH NODES  Regional Lymph Nodes Uninvolved by tumor cells  Number of Lymph Nodes Examined 1  Number of Old Fields Nodes Examined 1    Oncotype DX testing ordered but pending.    Patient has a family history of paternal grandmother with breast cancer, paternal aunt with breast cancer, cousin on father's side dying of breast cancer at age 43.  History of ovarian or prostate cancer or uterine cancer.  Mother had kidney cancer status post surgery    INVITAE testing positive for CHEK2- Patient is following up with  in July.     Patient is now scheduled for counseling with genetic and also with radiation oncology next week.  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   • Depression 01/01/1988   • Headache    • History of kidney stones    • Hyperlipidemia    • Hypertension    • Irritable bowel syndrome    • Lumbago    • Mood disorder (CMS/HCC)    •  Obesity life   • PONV (postoperative nausea and vomiting)    • 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   • Sleep apnea     C-PAP IN USE   • Vitamin D deficiency         Past Surgical History:   Procedure Laterality Date   • APPENDECTOMY      removed when removing dermoid on right ovary   • BREAST BIOPSY Right 2019   • BREAST LUMPECTOMY Right    • BREAST LUMPECTOMY WITH SENTINEL NODE BIOPSY Right 5/28/2019    Procedure: Right central partial mastectomy (with removal of the nipple-areola-complex) and sentinel lymph node biopsy;  Surgeon: Trena Greene MD;  Location: [facility] MAIN OR;  Service: General   • CHOLECYSTECTOMY     • COLONOSCOPY N/A 12/5/2019    Procedure: COLONOSCOPY TO CECUM;  Surgeon: Pedrito Fulton Jr., MD;  Location: [facility] ENDOSCOPY;  Service: General   • DERMOID CYST  EXCISION  1989   • HERNIA REPAIR  2006    umbilical   • HYSTERECTOMY Bilateral 09/04/2020    Dr. Neumann, due to ovarian cysts with previous hx breast cancer   • OVARY SURGERY      dermoid removed   • SALIVARY GLAND SURGERY  1993    due to cancer   • TONSILLECTOMY  01/01/1979    I was 8 years old        Current Outpatient Medications on File Prior to Visit   Medication Sig Dispense Refill   • allopurinol (Zyloprim) 100 MG tablet Take 1 tablet by mouth Daily. 90 tablet 1   • amLODIPine (NORVASC) 5 MG tablet Take 1 tablet by mouth Daily. 90 tablet 2   • anastrozole (Arimidex) 1 MG tablet Take 1 tablet by mouth Daily. 90 tablet 1   • Cholecalciferol 1.25 MG (47098 UT) tablet Take 50,000 Units by mouth 1 (One) Time Per Week. 4 tablet 4   • lisinopril (PRINIVIL,ZESTRIL) 40 MG tablet Take 1 tablet by mouth Daily. 90 tablet 1   • LORazepam (ATIVAN) 0.5 MG tablet Take 1 tablet by mouth Every 8 (Eight) Hours As Needed for Anxiety. for anxiety 45 tablet 0   • metFORMIN ER (GLUCOPHAGE-XR) 500 MG 24 hr tablet Take 1 tablet by mouth Daily With Breakfast. 90 tablet 1   • terbinafine (LamISIL) 250 MG tablet Take 1 tablet by mouth Daily.  90 tablet 0   • vitamin B-12 (CYANOCOBALAMIN) 1000 MCG tablet Take 1,000 mcg by mouth Daily.     • [DISCONTINUED] nitrofurantoin, macrocrystal-monohydrate, (Macrobid) 100 MG capsule Take 1 capsule by mouth 2 (Two) Times a Day. 10 capsule 0     No current facility-administered medications on file prior to visit.        ALLERGIES:    Allergies   Allergen Reactions   • Citalopram Rash   • Flexeril [Cyclobenzaprine] Unknown - High Severity     MUSCLES TENSE-OPPOSITE OF WHAT IT IS INTENDED TO DO-ELEVATES B/P   • Doxycycline Rash     Blisters on hands   • Keflex [Cephalexin] Rash   • Sulfa Antibiotics Rash     NAUSEA/VOMITING/FEVER   • Vilazodone Hcl Hives, Itching and Rash        Social History     Socioeconomic History   • Marital status:      Spouse name: Trena Montenegro   • Number of children: 0   • Years of education: Not on file   • Highest education level: Not on file   Tobacco Use   • Smoking status: Never Smoker   • Smokeless tobacco: Never Used   • Tobacco comment: Parents smoked.  I have never smoked   Substance and Sexual Activity   • Alcohol use: Yes     Comment: Only once or twice a year.  Mixed Drinks   • Drug use: No   • Sexual activity: Yes     Partners: Female     Birth control/protection: None        Family History   Problem Relation Age of Onset   • Hypertension Mother    • Arthritis Mother    • Kidney cancer Mother    • Depression Mother         Not Dr tommy- edison onofre   • Heart disease Father    • Hernia Father    • Hypertension Father    • No Known Problems Brother    • Breast cancer Paternal Aunt    • Breast cancer Paternal Cousin    • Drug abuse Paternal Uncle         life long   • Drug abuse Paternal Uncle         life long   • Breast cancer Paternal Grandmother    • Osteoporosis Maternal Grandmother    • Lung cancer Paternal Grandfather    • Malig Hyperthermia Neg Hx     PAST MEDICAL HISTORY:  1. Hypertension- Under control with medications  2. Pre-diabetes- Diet  control  3. Obesity  4. PCOS   5. Anxiety- Worse since cancer diagnosis. Taking Gabapentin since yesterday which helped with her symptoms. Followed by Alexa Trinh.   6. No hx of CVA or CAD.     FAMILY HISTORY:  Paternal cousin with breast cancer diagnosed at 43 and . Paternal aunt with breast cancer s/p mastectomy; paternal grandmother with breast cancer s/p lumpectomy.   No other family history of ovarian cancer or prostrate cancer  Mother had kidney cancer s/p surgery.     OB-GYN:  Menarche- 8 years old  Menopause- n/a  Pregnancies- none  Post menopausal HRT- N/A  Hx of birth control pills- None    SOCIAL HISTORY:  She works at [employer].  She is  to her wife for the last 2 years but lived with her for 19.  Years.  Patient's wife is a schoolteacher.  Patient does not smoke.  She does not drink.  Allergies reviewed with the patient today.      Review of Systems   Constitutional: Negative for activity change, appetite change, chills, diaphoresis, fatigue, fever and unexpected weight change (intended weight loss, see HPI).   HENT: Negative for hearing loss, mouth sores, nosebleeds, sore throat and trouble swallowing.    Respiratory: Negative for cough, chest tightness, shortness of breath and wheezing.    Cardiovascular: Negative for chest pain, palpitations and leg swelling.   Gastrointestinal: Negative for abdominal distention, abdominal pain, constipation, diarrhea, nausea and vomiting.   Genitourinary: Negative for difficulty urinating, dysuria, frequency, hematuria and urgency.   Musculoskeletal: Negative for joint swelling.        No muscle weakness.   Skin: Negative for rash and wound.   Neurological: Negative for dizziness, seizures, syncope, speech difficulty, weakness, numbness and headaches.   Hematological: Negative for adenopathy. Does not bruise/bleed easily.   Psychiatric/Behavioral: Negative for behavioral problems, confusion and suicidal ideas. The patient is nervous/anxious  "(07/27/21-Unchanged).    All other systems reviewed and are negative.    Patient has minimal hot flashes no arthralgias    Objective     Vitals:    07/27/21 0825   BP: 148/89   Pulse: 71   Resp: 18   Temp: 97.8 °F (36.6 °C)   TempSrc: Temporal   SpO2: 98%   Weight: 133 kg (293 lb 11.2 oz)   Height: 167.6 cm (65.98\")   PainSc: 0-No pain      Current Status 7/27/2021   ECOG score 0     PHYSICAL EXAM      CONSTITUTIONAL:  Vital signs reviewed.  Alert and oriented x3  No distress, looks comfortable.  EYES:  Conjunctivae and lids unremarkable.  Extraocular eye movements intact.  HEENT: No evidence of lymphadenopathy, no thyromegaly  BREAST: Right breast: No skin changes, no evidence of breast mass, no nipple discharge, no evidence of any right axillary adenopathy or right supraclavicular adenopathy  Left breast: No evidence of any skin changes, no evidence of any left breast mass and no evidence of left nipple discharge as well as no left axillary adenopathy or left supraclavicular adenopathy.  RESPIRATORY:  Normal respiratory effort.  No rales  or wheezing, clear.   CARDIOVASCULAR:  Regular rate and rhythm, no murmur  No significant lower extremity edema.  Abdomen: Soft nontender positive bowel sounds no hepatosplenomegaly  SKIN: No wounds.  No rashes.  MUSCULOSKELETAL/EXTREMITIES: No clubbing or cyanosis.  No apparent unilateral weakness.  NEURO: CN 2-12 appear intact. No focal neurological deficits noted.  PSYCHIATRIC:  Normal judgment and insight.  Normal mood and affect.      RECENT LABS:  Hematology WBC   Date Value Ref Range Status   07/27/2021 8.33 3.40 - 10.80 10*3/mm3 Final   09/04/2020 8.52 4.5 - 11.0 10*3/uL Final     RBC   Date Value Ref Range Status   07/27/2021 4.69 3.77 - 5.28 10*6/mm3 Final   09/04/2020 4.51 4.0 - 5.2 10*6/uL Final     Hemoglobin   Date Value Ref Range Status   07/27/2021 12.5 12.0 - 15.9 g/dL Final   09/04/2020 12.9 12.0 - 16.0 g/dL Final     Hematocrit   Date Value Ref Range Status "   07/27/2021 38.6 34.0 - 46.6 % Final   09/04/2020 38.4 36.0 - 46.0 % Final     Platelets   Date Value Ref Range Status   07/27/2021 293 140 - 450 10*3/mm3 Final   09/04/2020 266 140 - 440 10*3/uL Final            Assessment/Plan      1. High grade, invasive ductal carcinoma of right breast, ER/ID+, Her2 negative; clinical T1bN0, anatomic stage IA, prognostic stage IA:   · May 6, 2019 ultrasound-guided biopsy of the right breast mass,  - grade 3, invasive mammary carcinoma, Elvin score of 8, biopsy of right axillary lymph node negative  ER 82.35%, ID 92.68%, HER-2/latoya 1+ , Ki-67 14.8%    · May 28, 2019 right lumpectomy with removal of nipple areolar complex and right sentinel lymph node biopsy  -Central 2:00, invasive ductal carcinoma, grade 2, Elvin score of 6, tumor size is 10 mm, no DCIS, one lymph node negative, good margins  · Oncotype DX score of 6, low risk disease, been not give chemotherapy    · I discussed the pathology findings with the patient today including ER/ID+, Her2 negative status of her cancer.  I discussed about consideration of chemotherapy if Oncotype DX testing falls in a high risk category.  Patient is not  keen on receiving chemotherapy.  I further discussed in length with patient on the 3 different endocrine therapies tamoxifen/aromatase inhibitors/Evista. Discussed Tamoxifen is for pre-menopausal women and other two are for post-menopausal women. Since she is pre-menopausal, Tamoxifen will be her best and first option. I have detailed the side effects of tamoxifen including depression, hot flashes, rare chance for uterine cancer, weight gain, blood clots, DVT, PE, hair thinning, rare chance for thrombocytopenia, cataracts.  Aromatase inhibitors can cause arthralgias, hot flashes, decrease in bone density, rare chance for diarrhea, also discussed about hot flashes with it.  Evista is approved for osteopenia and can also be used for prophylaxis with fewer side effects except hot  flashes and rare chance for cataracts but it can improve bone density.     · We will plan on starting tamoxifen given premenopausal for 2 to 3 years followed by Arimidex once postmenopausal.  · 2020, patient continues on tamoxifen and is tolerating this well.  She will have a follow-up MRI of the breast in September.  · September 3, 2020 MRI breast negative  · 2020 s/p hysterectomy with bilateral subfinger oophorectomy, pathology benign  · We will switch patient to aromatase inhibitor since she is postmenopausal now with Arimidex, started 2020  · Tolerating Arimidex very well  · Reviewed results of mammogram from 2021 and it is negative.    2. Family history of cancer: Paternal cousin with breast cancer diagnosed at 43 and . Paternal aunt with breast cancer s/p mastectomy; paternal grandmother with breast cancer s/p lumpectomy.   · INVITAE testing positive for CHEK2 associated with dominant predisposition for breast colon, parathyroid and prostrate cancer.  ·  She has an appointment with  in early July.   · We will plan on alternating MRI of breast with mammogram alternating.  · Discussed in breast cancer conference today and Dr. Greene and we agreed that best option is to make an MRI of breast with mammogram every 6 months.  Given CHEK2 mutation patient is to alternate MRI with mammogram  ·     3.  Vitamin B-12 deficiency: B12 level 270, advised patient to take over the counter B-12 thousand micrograms orally daily.   · Encourage patient to take vitamin B12 1000 mcg twice weekly.    4. PCOS, Complicated with obesity: Patient attempted to undergo bariatric surgery in order to reduce weight and further help with her PCOS. This was stalled due to her recent breast cancer diagnosis. In past patient has been able to lose 85 lbs with diet modifications.   · Advised patient to exercise 5 times per week with 20 minutes of aerobics, total of 40 minutes/day .   · Suggested  Live strong program and patient to visit the cancer resource center.  · Referral to Nutritionist for further recommendations.   · Referred patient to OB/GYN today for further evaluation.  · Continues to follow closely with Dr. Lexii Rahman with recent pelvic exam, planning to have endometrial biopsy tomorrow.  · S/p hysterectomy with bilateral salpingo-oophorectomy September 4, 2020.  Pathology benign  · Doing well    5.  Anxiety: Patient has significant anxiety, has been referred to Alexa Trinh.  In the past she has tried Zoloft Wellbutrin and Seroquel and did not have good response to any of these medications.  · Patient is being monitored by Alexa Trinh.    6.  Hot flashes severe and also had mood swings following hysterectomy and salpingo-oophorectomy.  · 5/28/2021, hot flashes have significantly improved.  She does still experience occasional hot flashes.  · Resolved    7.  Hypertension.  Blood pressure control today.    8. Hypercalcemia  · The patient been evaluated by her PCP who first discontinued HCTZ and started Lasix due to hypercalcemia and elevated liver function studies.  While off HCTZ the patients calcium normalized and her liver function studies started to improve.  Her blood pressure was not controlled on Lasix, so the patient was then placed on HCTZ/lisinopril and Lasix was discontinued.   · Calcium today elevated at 10.6, the patient wishes to follow with her PCP and adjust her blood pressure medication further to see if this normalizes her calcium before proceeding with any scans.  · Related to hydrochlorothiazide and has been discontinued.  Calcium 10.1  · Calcium increased to 10.5 again today.  PTH intact was 37.4.  Ionized calcium mildly elevated.  Does not appear to be hyperparathyroidism.    9.  Bone density DEXA scan January 13, 2021 normal      PLAN:  1. Continue Arimidex  2. Continue vitamin D 3 2000 international units once daily   3. Increase exercise and patient is eating low  carbohydrate diet  4. We will recheck calcium at her next appointment.  5. Plan to continue alternating an MRI of the breast as well as a mammogram of the breast every 6 months due to the patient's CHEK2 mutation.  Last Mammogram 3/8/2021, scheduled for MRI of the breast in September 2021.  6. For her depression she continues with Alexa Trinh from the supportive oncology services and is also managed by her primary care physician  7. Due for bone density January 2023  8. Next MRI due in September 2021  9. Patient seen by Danny recently.  And has appointment with her in September 2021  10. Follow-up with me in 6 months with labs    The patient is on high risk medication that requires close monitoring for toxicity.    Tia Trevizo MD           Cc: Dr. Trena Hyatt, VICENTE Myles   Yes

## 2024-02-15 NOTE — DISCHARGE NOTE PROVIDER - CARE PROVIDER_API CALL
Jazlyn Bauman  Cardiovascular Disease  [address] [place] [id number]  Phone: [phone]  Fax: [phone]  Follow Up Time: 1 month

## 2024-02-15 NOTE — ASU PATIENT PROFILE, ADULT - FALL HARM RISK - UNIVERSAL INTERVENTIONS
Bed in lowest position, wheels locked, appropriate side rails in place/Call bell, personal items and telephone in reach/Instruct patient to call for assistance before getting out of bed or chair/Non-slip footwear when patient is out of bed/[place] to call system/Physically safe environment - no spills, clutter or unnecessary equipment/Purposeful Proactive Rounding/Room/bathroom lighting operational, light cord in reach

## 2024-02-15 NOTE — DISCHARGE NOTE PROVIDER - HOSPITAL COURSE
Patient is a 63y Female  with PMHx of HTN, HLD, seizures (last 12/2023), brain tumor, hypothyroidism, cervical cancer, ovarian cancer, TIAx7, new onset afib s/p cardioversion in 12/2023 converted back to afib 3 weeks post cardioversion who presented to [facility] for elective AF Ablation. On 2/15 patient underwent successful Cryo ablation for atrial fibrillation. Patient was monitored overnight. On POD 1 patient remains HD stable with no complaints. Patient remains in SR with no arrhythmias noted on tele. Examination of B/L common femoral venous access sites reveal a Clear and dry area with no hematoma or erythema. For PVI- Patient should continue Eliquis for thromboembolic prophylaxis. Patient is being DC home in stable condition.

## 2024-02-15 NOTE — PROGRESS NOTE ADULT - SUBJECTIVE AND OBJECTIVE BOX
Electrophysiology Brief Post-Op Note    I have personally seen and examined the patient.  I agree with the history and physical which I have reviewed and noted any changes below.  02-15-24 @ 16:41    PRE-OP DIAGNOSIS: Paroxysmal AFib    POST-OP DIAGNOSIS: Paroxysmal AFib    PROCEDURE:   - JACQUI   - Cryo AFib Ablation     VASCULAR ACCESS (with ultrasound guidance)   - Right femoral vein: 15 Fr   - Left femoral vein: 6 Fr, 8 Fr, 11 Fr   - Right femoral artery: none    Physician: Jazlyn Bauman MD  Assistant: None    ANESTHESIA TYPE:  [ X ] General Anesthesia  [  ] Sedation  [ X ] Local/Regional    ESTIMATED BLOOD LOSS:     10 mL    CONDITION  [  ] Critical  [  ] Serious  [  ]Fair  [ X ]Good    SPECIMENS REMOVED (IF APPLICABLE): N/A    IMPLANTS (IF APPLICABLE): N/A    FINDINGS: Paroxysmal AFib    COMPLICATIONS: None    PLAN OF CARE  -          Admit to telemetry  -	Start Eliquis 5 mg q12   -	Cont home PPI/H2 blocker  -	Bedrest until AM  -          Remove sutures in AM  -          Follow up with me in the office in 2-3 weeks                   Electrophysiology Brief Post-Op Note    I have personally seen and examined the patient.  I agree with the history and physical which I have reviewed and noted any changes below.  02-15-24 @ 12:30 PM    PRE-OP DIAGNOSIS: Paroxysmal AFib    POST-OP DIAGNOSIS: Paroxysmal AFib    PROCEDURE:   - JACQUI   - Cryo AFib Ablation     VASCULAR ACCESS (with ultrasound guidance)   - Right femoral vein: 15 Fr   - Left femoral vein: 8 Fr, 11 Fr   - Right femoral artery: none    Physician: Jazlyn Bauman MD  Assistant: None    ANESTHESIA TYPE:  [ X ] General Anesthesia  [  ] Sedation  [ X ] Local/Regional    ESTIMATED BLOOD LOSS:     10 mL    CONDITION  [  ] Critical  [  ] Serious  [  ]Fair  [ X ]Good    SPECIMENS REMOVED (IF APPLICABLE): N/A    IMPLANTS (IF APPLICABLE): N/A    FINDINGS: Paroxysmal AFib    COMPLICATIONS: None    PLAN OF CARE  -          Admit to telemetry  -	Start Eliquis 5 mg q12   -	Cont home PPI/H2 blocker  -	Bedrest until AM  -          Remove sutures in AM  -          Follow up with me in the office in 2-3 weeks

## 2024-02-15 NOTE — DISCHARGE NOTE PROVIDER - NSDCCPTREATMENT_GEN_ALL_CORE_FT
PRINCIPAL PROCEDURE  Procedure: Atrial ablation  Findings and Treatment: - Please start taking famotidine 40 mg daily for 30 days.  - You should restart your Eliquis 5mg.   - You may shower today.  - Do not drive or operate heavy machinery for 3 days.  - Do not submerge in water (example: baths, swimming) for 1 week.  - No squatting, exertional activities, or lifting anything > 10 lbs for 1 week.  - Any sudden swelling, redness, fever, discharge, or severe pain, call the Electrophysiology Office at [phone].     PRINCIPAL PROCEDURE  Procedure: Atrial ablation  Findings and Treatment: - Please start taking Carafate and Protonix  - You should restart your Eliquis 5mg.   - You may shower today.  - Do not drive or operate heavy machinery for 3 days.  - Do not submerge in water (example: baths, swimming) for 1 week.  - No squatting, exertional activities, or lifting anything > 10 lbs for 1 week.  - Any sudden swelling, redness, fever, discharge, or severe pain, call the Electrophysiology Office at [phone].

## 2024-02-15 NOTE — DISCHARGE NOTE PROVIDER - NSDCFUSCHEDAPPT_GEN_ALL_CORE_FT
Jazlyn Bauman  [facility]  ELECTROPH [address]  Scheduled Appointment: 03/13/2024    Rock Rudolph  [facility]  CARDIOLOGY [address]  Scheduled Appointment: 03/22/2024    Beau Orozco  [facility]  [address]  Scheduled Appointment: 04/09/2024     Jazlyn Bauman  [facility]  ELECTROPH [address]  Scheduled Appointment: 03/13/2024    Rock Rudolph  [facility]  CARDIOLOGY [address]  Scheduled Appointment: 03/22/2024    Beau Orozco  [facility]  [address]  Scheduled Appointment: 04/08/2024

## 2024-02-15 NOTE — PRE PROCEDURE NOTE - PRE PROCEDURE EVALUATION
I have personally seen and examined the patient. I agree with the history and physical//consult//progress note, which I have reviewed and noted any changes below.     Pre-op Diagnosis: AFib    Procedure: JACQUI, AFib Ablation and All Related Procedures

## 2024-02-15 NOTE — PATIENT PROFILE ADULT - FALL HARM RISK - HARM RISK INTERVENTIONS

## 2024-02-16 ENCOUNTER — TRANSCRIPTION ENCOUNTER (OUTPATIENT)
Age: 64
End: 2024-02-16

## 2024-02-16 VITALS
TEMPERATURE: 98 F | HEART RATE: 68 BPM | SYSTOLIC BLOOD PRESSURE: 127 MMHG | RESPIRATION RATE: 21 BRPM | DIASTOLIC BLOOD PRESSURE: 62 MMHG

## 2024-02-16 PROCEDURE — 93010 ELECTROCARDIOGRAM REPORT: CPT

## 2024-02-16 PROCEDURE — 99232 SBSQ HOSP IP/OBS MODERATE 35: CPT

## 2024-02-16 RX ORDER — APIXABAN 2.5 MG/1
1 TABLET, FILM COATED ORAL
Qty: 0 | Refills: 0 | DISCHARGE
Start: 2024-02-16

## 2024-02-16 RX ORDER — FAMOTIDINE 10 MG/ML
1 INJECTION INTRAVENOUS
Qty: 0 | Refills: 0 | DISCHARGE

## 2024-02-16 RX ORDER — SUCRALFATE 1 G
1 TABLET ORAL
Qty: 120 | Refills: 0
Start: 2024-02-16 | End: 2024-03-16

## 2024-02-16 RX ORDER — LEVOTHYROXINE SODIUM 125 MCG
1 TABLET ORAL
Refills: 0 | DISCHARGE

## 2024-02-16 RX ORDER — ESCITALOPRAM OXALATE 10 MG/1
1 TABLET, FILM COATED ORAL
Refills: 0 | DISCHARGE

## 2024-02-16 RX ORDER — CHOLECALCIFEROL (VITAMIN D3) 125 MCG
5000 CAPSULE ORAL
Qty: 0 | Refills: 0 | DISCHARGE
Start: 2024-02-16

## 2024-02-16 RX ORDER — LEVOTHYROXINE SODIUM 125 MCG
1 TABLET ORAL
Qty: 0 | Refills: 0 | DISCHARGE
Start: 2024-02-16

## 2024-02-16 RX ORDER — LEVETIRACETAM 250 MG/1
1.5 TABLET, FILM COATED ORAL
Refills: 0 | DISCHARGE

## 2024-02-16 RX ORDER — LEVETIRACETAM 250 MG/1
2 TABLET, FILM COATED ORAL
Qty: 0 | Refills: 0 | DISCHARGE
Start: 2024-02-16

## 2024-02-16 RX ORDER — ATORVASTATIN CALCIUM 80 MG/1
1 TABLET, FILM COATED ORAL
Refills: 0 | DISCHARGE

## 2024-02-16 RX ORDER — OXYCODONE AND ACETAMINOPHEN 5; 325 MG/1; MG/1
1 TABLET ORAL
Refills: 0 | DISCHARGE

## 2024-02-16 RX ORDER — ATORVASTATIN CALCIUM 80 MG/1
1 TABLET, FILM COATED ORAL
Qty: 0 | Refills: 0 | DISCHARGE
Start: 2024-02-16

## 2024-02-16 RX ORDER — APIXABAN 2.5 MG/1
1 TABLET, FILM COATED ORAL
Refills: 0 | DISCHARGE

## 2024-02-16 RX ORDER — PANTOPRAZOLE SODIUM 20 MG/1
1 TABLET, DELAYED RELEASE ORAL
Qty: 30 | Refills: 11
Start: 2024-02-16 | End: 2025-02-09

## 2024-02-16 RX ORDER — CHOLECALCIFEROL (VITAMIN D3) 125 MCG
1 CAPSULE ORAL
Refills: 0 | DISCHARGE

## 2024-02-16 RX ORDER — METOPROLOL TARTRATE 50 MG
1 TABLET ORAL
Refills: 0 | DISCHARGE

## 2024-02-16 RX ORDER — ESCITALOPRAM OXALATE 10 MG/1
1 TABLET, FILM COATED ORAL
Qty: 0 | Refills: 0 | DISCHARGE
Start: 2024-02-16

## 2024-02-16 RX ORDER — METOPROLOL TARTRATE 50 MG
1 TABLET ORAL
Qty: 0 | Refills: 0 | DISCHARGE
Start: 2024-02-16

## 2024-02-16 RX ADMIN — Medication 650 MILLIGRAM(S): at 00:03

## 2024-02-16 RX ADMIN — LISINOPRIL 40 MILLIGRAM(S): 2.5 TABLET ORAL at 05:09

## 2024-02-16 RX ADMIN — APIXABAN 5 MILLIGRAM(S): 2.5 TABLET, FILM COATED ORAL at 05:09

## 2024-02-16 RX ADMIN — Medication 1 GRAM(S): at 12:32

## 2024-02-16 RX ADMIN — ESCITALOPRAM OXALATE 10 MILLIGRAM(S): 10 TABLET, FILM COATED ORAL at 12:33

## 2024-02-16 RX ADMIN — Medication 5000 UNIT(S): at 12:33

## 2024-02-16 RX ADMIN — Medication 300 MICROGRAM(S): at 12:32

## 2024-02-16 RX ADMIN — PANTOPRAZOLE SODIUM 40 MILLIGRAM(S): 20 TABLET, DELAYED RELEASE ORAL at 05:09

## 2024-02-16 RX ADMIN — Medication 25 MILLIGRAM(S): at 05:09

## 2024-02-16 RX ADMIN — Medication 1 GRAM(S): at 05:09

## 2024-02-16 RX ADMIN — LEVETIRACETAM 1500 MILLIGRAM(S): 250 TABLET, FILM COATED ORAL at 05:10

## 2024-02-16 NOTE — DISCHARGE NOTE NURSING/CASE MANAGEMENT/SOCIAL WORK - NSTRANSFERBELONGINGSDISPO_GEN_A_NUR
venous distention was noted. The abdomen was soft  with positive bowel sounds. The extremities had only minimal edema. The patient was treated during her hospitalization with IV diuretics  which resulted in an improvement in her symptoms. She has recently had  the Lopressor added which has resulted in improved heart rate control. The patient has required supplementation with potassium to maintain her  potassium level. We added lisinopril to her drug regimen due to the  presence of some mild hypertension. The patient's care has become  increasingly difficult since the recent passing of her . In  consultation with the care team and hospitalist and family, I think that  change to [facility] code status is appropriate. They are planning to meet  with the PCP and nursing staff from [facility] on Tuesday to make a  decision about hospice. They are considering enrolling at that point. On Saturday, the patient was felt to have reached the maximum benefit of  her hospital stay. She was in a stable condition and decision was made  that she can be released to [facility] for further management in the  outpatient setting. She will follow up with her PCP at [facility],  Aaron Hastings. At this point, the family is hoping to avoid further  hospitalizations and focus on comfort measures moving forward.         Mercedes Kwan    D: 03/25/2023 14:25:22       T: 03/25/2023 15:16:23     [email or web address]  Job#: [id number]     Doc#: [id number]    CC:
with patient

## 2024-02-16 NOTE — PROGRESS NOTE ADULT - SUBJECTIVE AND OBJECTIVE BOX
INTERVAL HPI/OVERNIGHT EVENTS:    Patient s/p      AF       ablation.   No events over night  Patient in NSR    MEDICATIONS  (STANDING):  apixaban 5 milliGRAM(s) Oral every 12 hours  atorvastatin 10 milliGRAM(s) Oral at bedtime  cholecalciferol 5000 Unit(s) Oral daily  escitalopram 10 milliGRAM(s) Oral daily  famotidine    Tablet 40 milliGRAM(s) Oral at bedtime  influenza   Vaccine 0.5 milliLiter(s) IntraMuscular once  levETIRAcetam 1500 milliGRAM(s) Oral two times a day  levothyroxine 300 MICROGram(s) Oral daily  lisinopril 40 milliGRAM(s) Oral daily  melatonin 5 milliGRAM(s) Oral at bedtime  metoprolol succinate ER 25 milliGRAM(s) Oral daily  pantoprazole    Tablet 40 milliGRAM(s) Oral before breakfast  sucralfate 1 Gram(s) Oral four times a day    MEDICATIONS  (PRN):  acetaminophen     Tablet .. 650 milliGRAM(s) Oral every 6 hours PRN Moderate Pain (4 - 6)      Allergies    No Known Allergies    Intolerances          Vital Signs Last 24 Hrs  T(C): 36.4 (2024 07:48), Max: 36.8 (15 Feb 2024 16:32)  T(F): 97.5 (2024 07:48), Max: 98.3 (15 Feb 2024 16:32)  HR: 68 (2024 07:48) (60 - 96)  BP: 127/62 (2024 07:48) (115/72 - 169/78)  BP(mean): 88 (2024 07:48) (87 - 109)  RR: 21 (2024 07:48) (14 - 21)  SpO2: 95% (2024 00:00) (95% - 99%)    Parameters below as of 2024 04:00  Patient On (Oxygen Delivery Method): room air        GENERAL: In no apparent distress, well nourished, and hydrated.  HEART: Regular rate and rhythm; No murmurs, rubs, or gallops.  PULMONARY: Clear to auscultation and perfusion.  No rales, wheezing, or rhonchi bilaterally.  ABDOMEN: Soft, Nontender, Nondistended; Bowel sounds present  EXTREMITIES:  2+ Peripheral Pulses, No clubbing, cyanosis, or edema  groins No hematoma, no bleeding; stiches removed  NEUROLOGICAL: Grossly nonfocal    LABS:                EK Lead ECG:   Ventricular Rate 62 BPM    Atrial Rate 62 BPM    P-R Interval 174 ms    QRS Duration 82 ms    Q-T Interval 440 ms    QTC Calculation(Bazett) 446 ms    P Axis 61 degrees    R Axis 76 degrees    T Axis 47 degrees    Diagnosis Line Normal sinus rhythm  Normal ECG    Confirmed by Obdulia Montez ([id number]) on 2024 8:44:40 AM (24 @ 07:40)      A/P  Patient S/P    AF Ablation  Patient is doing well  - continue Eliquis  DO NOT SKIP A SINGLE DOSE FOR THE NEXT 3 MONTH  - protonix 40 mg daily x 30 days  - No heavy lifting or exertional activities for 10days  - Can take a shower, no bathtub for 10days, do not submerge yourself in water  - FU in EP office with Dr Bauman in 1 month     INTERVAL HPI/OVERNIGHT EVENTS:  Patient s/p   AF ablation.   No events over night  Patient in NSR    MEDICATIONS  (STANDING):  apixaban 5 milliGRAM(s) Oral every 12 hours  atorvastatin 10 milliGRAM(s) Oral at bedtime  cholecalciferol 5000 Unit(s) Oral daily  escitalopram 10 milliGRAM(s) Oral daily  famotidine    Tablet 40 milliGRAM(s) Oral at bedtime  influenza   Vaccine 0.5 milliLiter(s) IntraMuscular once  levETIRAcetam 1500 milliGRAM(s) Oral two times a day  levothyroxine 300 MICROGram(s) Oral daily  lisinopril 40 milliGRAM(s) Oral daily  melatonin 5 milliGRAM(s) Oral at bedtime  metoprolol succinate ER 25 milliGRAM(s) Oral daily  pantoprazole    Tablet 40 milliGRAM(s) Oral before breakfast  sucralfate 1 Gram(s) Oral four times a day    MEDICATIONS  (PRN):  acetaminophen     Tablet .. 650 milliGRAM(s) Oral every 6 hours PRN Moderate Pain (4 - 6)      Allergies    No Known Allergies    Intolerances          Vital Signs Last 24 Hrs  T(C): 36.4 (2024 07:48), Max: 36.8 (15 Feb 2024 16:32)  T(F): 97.5 (2024 07:48), Max: 98.3 (15 Feb 2024 16:32)  HR: 68 (2024 07:48) (60 - 96)  BP: 127/62 (2024 07:48) (115/72 - 169/78)  BP(mean): 88 (2024 07:48) (87 - 109)  RR: 21 (2024 07:48) (14 - 21)  SpO2: 95% (2024 00:00) (95% - 99%)    Parameters below as of 2024 04:00  Patient On (Oxygen Delivery Method): room air        GENERAL: In no apparent distress, well nourished, and hydrated.  HEART: Regular rate and rhythm; No murmurs, rubs, or gallops.  PULMONARY: Clear to auscultation and perfusion.    ABDOMEN: Soft  EXTREMITIES:  2+ Peripheral Pulses   groins No hematoma, no bleeding; stitches removed  NEUROLOGICAL: Grossly nonfocal    LABS:                EK Lead ECG:   Ventricular Rate 62 BPM    Atrial Rate 62 BPM    P-R Interval 174 ms    QRS Duration 82 ms    Q-T Interval 440 ms    QTC Calculation(Bazett) 446 ms    P Axis 61 degrees    R Axis 76 degrees    T Axis 47 degrees    Diagnosis Line Normal sinus rhythm  Normal ECG    Confirmed by Obdulia Montez ([id number]) on 2024 8:44:40 AM (24 @ 07:40)      A/P  Patient S/P    AF Ablation  Patient is doing well  - continue Eliquis  DO NOT SKIP A SINGLE DOSE FOR THE NEXT 3 MONTH  - protonix 40 mg daily x 30 days  - No heavy lifting or exertional activities for 10days  - Can take a shower, no bathtub for 10days, do not submerge yourself in water  - FU in EP office with Dr Bauman in 1 month

## 2024-02-16 NOTE — DISCHARGE NOTE NURSING/CASE MANAGEMENT/SOCIAL WORK - PATIENT PORTAL LINK FT
You can access the FollowMyHealth Patient Portal offered by [facility] by registering at the following website: [email or web address]. By joining [facility] FollowMyHealth portal, you will also be able to view your health information using other applications (apps) compatible with our system.

## 2024-02-16 NOTE — DISCHARGE NOTE NURSING/CASE MANAGEMENT/SOCIAL WORK - NSDCPEFALRISK_GEN_ALL_CORE
For information on Fall & Injury Prevention, visit: [email or web address] OR  [email or web address] OR  https://www.cdc.gov/steadi/patient.html

## 2024-02-16 NOTE — PROGRESS NOTE ADULT - NS ATTEND AMEND GEN_ALL_CORE FT
s/p AF ablation. Bilateral groins assessed. Cont Eliquis  Doing well  Would like to schedule Amulet in a few months. Will discuss again in office.

## 2024-02-18 ENCOUNTER — NON-APPOINTMENT (OUTPATIENT)
Age: 64
End: 2024-02-18

## 2024-02-22 PROBLEM — K44.9 DIAPHRAGMATIC HERNIA WITHOUT OBSTRUCTION OR GANGRENE: Chronic | Status: ACTIVE | Noted: 2024-02-01

## 2024-02-22 PROBLEM — Q27.30 ARTERIOVENOUS MALFORMATION, SITE UNSPECIFIED: Chronic | Status: ACTIVE | Noted: 2024-02-01

## 2024-02-22 PROBLEM — G45.9 TRANSIENT CEREBRAL ISCHEMIC ATTACK, UNSPECIFIED: Chronic | Status: ACTIVE | Noted: 2024-02-01

## 2024-02-22 PROBLEM — Z87.19 PERSONAL HISTORY OF OTHER DISEASES OF THE DIGESTIVE SYSTEM: Chronic | Status: ACTIVE | Noted: 2024-02-01

## 2024-03-01 DIAGNOSIS — Z85.41 PERSONAL HISTORY OF MALIGNANT NEOPLASM OF CERVIX UTERI: ICD-10-CM

## 2024-03-01 DIAGNOSIS — Z79.890 HORMONE REPLACEMENT THERAPY: ICD-10-CM

## 2024-03-01 DIAGNOSIS — K44.9 DIAPHRAGMATIC HERNIA WITHOUT OBSTRUCTION OR GANGRENE: ICD-10-CM

## 2024-03-01 DIAGNOSIS — Z85.42 PERSONAL HISTORY OF MALIGNANT NEOPLASM OF OTHER PARTS OF UTERUS: ICD-10-CM

## 2024-03-01 DIAGNOSIS — Z79.01 LONG TERM (CURRENT) USE OF ANTICOAGULANTS: ICD-10-CM

## 2024-03-01 DIAGNOSIS — E66.9 OBESITY, UNSPECIFIED: ICD-10-CM

## 2024-03-01 DIAGNOSIS — Z98.1 ARTHRODESIS STATUS: ICD-10-CM

## 2024-03-01 DIAGNOSIS — E78.5 HYPERLIPIDEMIA, UNSPECIFIED: ICD-10-CM

## 2024-03-01 DIAGNOSIS — Z79.899 OTHER LONG TERM (CURRENT) DRUG THERAPY: ICD-10-CM

## 2024-03-01 DIAGNOSIS — K21.9 GASTRO-ESOPHAGEAL REFLUX DISEASE WITHOUT ESOPHAGITIS: ICD-10-CM

## 2024-03-01 DIAGNOSIS — R56.9 UNSPECIFIED CONVULSIONS: ICD-10-CM

## 2024-03-01 DIAGNOSIS — E03.9 HYPOTHYROIDISM, UNSPECIFIED: ICD-10-CM

## 2024-03-01 DIAGNOSIS — Z85.43 PERSONAL HISTORY OF MALIGNANT NEOPLASM OF OVARY: ICD-10-CM

## 2024-03-01 DIAGNOSIS — I48.0 PAROXYSMAL ATRIAL FIBRILLATION: ICD-10-CM

## 2024-03-01 DIAGNOSIS — I10 ESSENTIAL (PRIMARY) HYPERTENSION: ICD-10-CM

## 2024-03-01 DIAGNOSIS — Z86.73 PERSONAL HISTORY OF TRANSIENT ISCHEMIC ATTACK (TIA), AND CEREBRAL INFARCTION WITHOUT RESIDUAL DEFICITS: ICD-10-CM

## 2024-03-01 DIAGNOSIS — M79.7 FIBROMYALGIA: ICD-10-CM

## 2024-03-01 DIAGNOSIS — Z86.79 PERSONAL HISTORY OF OTHER DISEASES OF THE CIRCULATORY SYSTEM: ICD-10-CM

## 2024-03-01 DIAGNOSIS — K22.70 BARRETT'S ESOPHAGUS WITHOUT DYSPLASIA: ICD-10-CM

## 2024-03-13 ENCOUNTER — APPOINTMENT (OUTPATIENT)
Dept: ELECTROPHYSIOLOGY | Facility: CLINIC | Age: 64
End: 2024-03-13
Payer: COMMERCIAL

## 2024-03-13 VITALS
SYSTOLIC BLOOD PRESSURE: 140 MMHG | WEIGHT: 225 LBS | HEIGHT: 68 IN | HEART RATE: 96 BPM | RESPIRATION RATE: 20 BRPM | BODY MASS INDEX: 34.1 KG/M2 | TEMPERATURE: 97.1 F | DIASTOLIC BLOOD PRESSURE: 90 MMHG

## 2024-03-13 DIAGNOSIS — Z78.9 OTHER SPECIFIED HEALTH STATUS: ICD-10-CM

## 2024-03-13 DIAGNOSIS — G40.909 EPILEPSY, UNSPECIFIED, NOT INTRACTABLE, W/OUT STATUS EPILEPTICUS: ICD-10-CM

## 2024-03-13 PROCEDURE — 99215 OFFICE O/P EST HI 40 MIN: CPT | Mod: 25

## 2024-03-13 PROCEDURE — 93000 ELECTROCARDIOGRAM COMPLETE: CPT

## 2024-03-13 NOTE — ASSESSMENT
[FreeTextEntry1] : # Recurrent AFib - Cont Eliquis 5mg PO BID for CHADs Vasc 4 (HTN, F, TIA).  - s/p cardioversion 11/23. Went back into AF. s/p ablation, now back in AF. Discussed option of cardioversion and AAD, but pt states she feels relatively well and wishes to pursue NITZA occlusion. Referring cardio Dr. Rudolph is in agreement with Amulet. CT to assess NITZA prior to occlusion.  - Denies sleep apnea symptoms.  - Increase BB for better rate control from Metoprolol ER 50 mg daily to 75 mg daily.  # HTN - BP borderline elevated - Increase Metoprolol Succinate ER to 75 mg daily and continue Ramipril 10 mg daily - 2g Na diet enforced  I have also advised the patient to go to the nearest emergency room if s/he experiences any chest pain, dyspnea, syncope, or has any other compelling symptoms.    Follow up 2-4 weeks after left atrial appendage occlusion device.

## 2024-03-13 NOTE — PHYSICAL EXAM
[Well Developed] : well developed [Well Nourished] : well nourished [Obese] : obese [No Acute Distress] : no acute distress [Normal Conjunctiva] : normal conjunctiva [No Murmur] : no murmur [Normal S1, S2] : normal S1, S2 [Clear Lung Fields] : clear lung fields [Good Air Entry] : good air entry [No Respiratory Distress] : no respiratory distress  [Normal Gait] : normal gait [Soft] : abdomen soft [No Edema] : no edema [No Rash] : no rash [Moves all extremities] : moves all extremities [Normal Speech] : normal speech [Alert and Oriented] : alert and oriented [de-identified] : irregular rate and rhythm [de-identified] : Bilateral groins well healed. No hematoma, ecchymosis, or bleeding noted.

## 2024-03-13 NOTE — CARDIOLOGY SUMMARY
[de-identified] : 3/13/2024 AFib with cont VR (HR 96 bpm) 1/24/2024 AFib with cont VR (HR 82 bpm) [de-identified] : Lexiscan 10/2023, No ischemia. [de-identified] : 11/14/2023 LVEF 63% Mild LAE (38 ml/m2 ml/m2). mild MR. Mild AI. Mild TR.  [de-identified] : Cryo AF ablation 2/15/2024

## 2024-03-13 NOTE — HISTORY OF PRESENT ILLNESS
[FreeTextEntry1] : Cardio: Dr. Rudolph PCP: Dr. Haynes  62 yo F with h/o HTN, HL, brain tumor, hypothyroidism with recent diagnosis of AFib. 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 Had COVID 4/23. Can have seizures at night. Here for evaluation of NITZA occlusion device.   Orthostatics at initial visit Lying Down /80; HR 71  Sitting Up    /78; HR 80 Standing     /80; HR 90    3/13/2024 Here for routine follow up visit after AF ablation. Had Cryo AF ablation on 2/15/24. Went back into AF with RVR and had palpitations the day after discharge. Took an extra dose of Metoprolol and has been feeling well since with occasional palpitations.

## 2024-03-13 NOTE — DISCUSSION/SUMMARY
[FreeTextEntry1] : We discussed the risks/benefits/alternatives, nature of procedure, and follow up care after Amulet is implanted. We discussed the risks including but not limited to bleeding, hematoma, injury to vessels and heart, perforation, tamponade, pneumothorax, infection, embolization, malfunction, and rare risks of stroke/heart attack/death. Patient expressed understanding of the discussion.   Patient understands that they will need to take ASA and Clopidogrel for 6 mo, followed by aspirin indefinitely. I answered all questions in detail and patient was agreeable to Amulet implant. [EKG obtained to assist in diagnosis and management of assessed problem(s)] : EKG obtained to assist in diagnosis and management of assessed problem(s)

## 2024-03-18 ENCOUNTER — RESULT REVIEW (OUTPATIENT)
Age: 64
End: 2024-03-18

## 2024-03-18 ENCOUNTER — OUTPATIENT (OUTPATIENT)
Dept: OUTPATIENT SERVICES | Facility: HOSPITAL | Age: 64
LOS: 1 days | End: 2024-03-18
Payer: COMMERCIAL

## 2024-03-18 DIAGNOSIS — Z92.89 PERSONAL HISTORY OF OTHER MEDICAL TREATMENT: Chronic | ICD-10-CM

## 2024-03-18 DIAGNOSIS — Z98.890 OTHER SPECIFIED POSTPROCEDURAL STATES: Chronic | ICD-10-CM

## 2024-03-18 DIAGNOSIS — Z98.1 ARTHRODESIS STATUS: Chronic | ICD-10-CM

## 2024-03-18 DIAGNOSIS — Z00.8 ENCOUNTER FOR OTHER GENERAL EXAMINATION: ICD-10-CM

## 2024-03-18 PROCEDURE — 75572 CT HRT W/3D IMAGE: CPT | Mod: 26

## 2024-03-18 PROCEDURE — 75572 CT HRT W/3D IMAGE: CPT

## 2024-03-19 DIAGNOSIS — Z00.8 ENCOUNTER FOR OTHER GENERAL EXAMINATION: ICD-10-CM

## 2024-03-22 ENCOUNTER — APPOINTMENT (OUTPATIENT)
Dept: CARDIOLOGY | Facility: CLINIC | Age: 64
End: 2024-03-22

## 2024-03-25 NOTE — ED ADULT TRIAGE NOTE - GLASGOW COMA SCALE: SCORE, MLM
"Pt came into office to say that he does not want to take the metformin the way Patti is saying pt states that he has been taking medication like that for 5 months and it has not helped bs, pt states that he was going to take 2 tablets in the morning and 1 tablet at night and take the glipizide once a day. Pt stated that he is worried about his health and \"this way will help\". Pt then stated that if he needs to find a new dr he will but he is concerned about his bs.   Please advise.   " 15

## 2024-04-02 PROBLEM — E78.00 HYPERCHOLESTEREMIA: Status: ACTIVE | Noted: 2021-01-03

## 2024-04-02 PROBLEM — Z86.73 HISTORY OF TIA (TRANSIENT ISCHEMIC ATTACK): Status: ACTIVE | Noted: 2024-01-24

## 2024-04-02 NOTE — PHYSICAL EXAM
[Well Nourished] : well nourished [Well Developed] : well developed [No Acute Distress] : no acute distress [Normal Conjunctiva] : normal conjunctiva [Normal Venous Pressure] : normal venous pressure [No Carotid Bruit] : no carotid bruit [Normal S1, S2] : normal S1, S2 [No Rub] : no rub [No Murmur] : no murmur [No Gallop] : no gallop [Clear Lung Fields] : clear lung fields [Good Air Entry] : good air entry [Soft] : abdomen soft [No Respiratory Distress] : no respiratory distress  [Non Tender] : non-tender [No Masses/organomegaly] : no masses/organomegaly [Normal Bowel Sounds] : normal bowel sounds [No Edema] : no edema [Normal Gait] : normal gait [No Cyanosis] : no cyanosis [No Clubbing] : no clubbing [No Varicosities] : no varicosities [No Skin Lesions] : no skin lesions [No Rash] : no rash [Moves all extremities] : moves all extremities [No Focal Deficits] : no focal deficits [Normal Speech] : normal speech [Alert and Oriented] : alert and oriented [Normal memory] : normal memory

## 2024-04-03 ENCOUNTER — APPOINTMENT (OUTPATIENT)
Dept: CARDIOLOGY | Facility: CLINIC | Age: 64
End: 2024-04-03
Payer: COMMERCIAL

## 2024-04-03 VITALS — DIASTOLIC BLOOD PRESSURE: 86 MMHG | SYSTOLIC BLOOD PRESSURE: 142 MMHG

## 2024-04-03 VITALS
DIASTOLIC BLOOD PRESSURE: 90 MMHG | HEIGHT: 68 IN | BODY MASS INDEX: 34.1 KG/M2 | SYSTOLIC BLOOD PRESSURE: 176 MMHG | OXYGEN SATURATION: 94 % | WEIGHT: 225 LBS | HEART RATE: 106 BPM

## 2024-04-03 DIAGNOSIS — Z86.73 PERSONAL HISTORY OF TRANSIENT ISCHEMIC ATTACK (TIA), AND CEREBRAL INFARCTION W/OUT RESIDUAL DEFICITS: ICD-10-CM

## 2024-04-03 DIAGNOSIS — E78.00 PURE HYPERCHOLESTEROLEMIA, UNSPECIFIED: ICD-10-CM

## 2024-04-03 PROCEDURE — 99213 OFFICE O/P EST LOW 20 MIN: CPT

## 2024-04-03 NOTE — HISTORY OF PRESENT ILLNESS
[FreeTextEntry1] : 64 yo hx HTN HLD  in 2001Brain AVM in a large  cluster had full craniotomy, treated w steroids for 2yrs causing significant weight gain.  h/o .,hypothyroid.  She gets NUNEZ. She has partial focal  seizures  lasting no more than 2 min mostly at night.   She had covid 4/23.  She had 7 TIA's and has residual hemianopia . . She saw [facility] DR Haynes found pt in A fib. She is presently being evaluated for NITZA occlusion device.  Pt has had DCCV 11/23 went back in AFib, ablation  2/15/24 also unsuccessful. Her rate was high so Dr Bauman increased metoprolol to 75mg daily.  Nasra 10/23 no ischemia, ECHO 11/23 LVEF 63% mild LAE, mild MR mild AI mild TR. Pt overall feels well  . Pt denies chest pain, shortness of breath,  palpitations,dizziness, syncope or near syncope.

## 2024-04-03 NOTE — DISCUSSION/SUMMARY
[FreeTextEntry1] : 62 yo hx HTN HLD  in 2001Brain AVM in a large  cluster had full craniotomy, treated w steroids for 2yrs causing significant weight gain.  h/o .,hypothyroid.  She gets NUNEZ. She has partial focal  seizures  lasting no more than 2 min mostly at night.   She had covid 4/23.  She had 7 TIA's and has residual hemianopia . . She saw [facility] DR Haynes found pt in A fib. She is presently being evaluated for NITZA occlusion device.  Pt has had DCCV 11/23 went back in AFib, ablation  2/15/24 also unsuccessful. Her rate was high so Dr Bauman increased metoprolol to 75mg daily.  Nasra 10/23 no ischemia, ECHO 11/23 LVEF 63% mild LAE, mild MR mild AI mild TR. Pt overall feels well  . Pt denies chest pain, shortness of breath,  palpitations,dizziness, syncope or near syncope.Review of lipids 1/24 byDr Alex  H 61   . Pt last BMI 34. BP at home usu 140/90. Pt needs better lipid and BP control. Will increase ator to 20mg and ramipril to twice daily. Pt will call in 1 wk with BP. Low Na diet and exercise re enforced.. Pt  NITZA occlusion device  scheduled for 2-3 wk.  VR to AF on 75mg metoprolol is 96 BPM at this time . Labs from Dr Alex reviewed  Vit D 17 no reported supplement . Left v/m to call and inquire re supplment   Reviewed dosing  for the anticoagulant. Reminded pt to inform us for increased bleeding gums, blood in urine or stool , Re enforced compliance and importance of taking medication as prescribed. Instructed pt to go to ER for excessive bleeding.

## 2024-04-09 ENCOUNTER — OUTPATIENT (OUTPATIENT)
Dept: OUTPATIENT SERVICES | Facility: HOSPITAL | Age: 64
LOS: 1 days | End: 2024-04-09
Payer: COMMERCIAL

## 2024-04-09 VITALS
WEIGHT: 257.94 LBS | OXYGEN SATURATION: 100 % | RESPIRATION RATE: 18 BRPM | HEART RATE: 98 BPM | DIASTOLIC BLOOD PRESSURE: 84 MMHG | HEIGHT: 68 IN | TEMPERATURE: 98 F | SYSTOLIC BLOOD PRESSURE: 170 MMHG

## 2024-04-09 DIAGNOSIS — Z92.89 PERSONAL HISTORY OF OTHER MEDICAL TREATMENT: Chronic | ICD-10-CM

## 2024-04-09 DIAGNOSIS — Z98.890 OTHER SPECIFIED POSTPROCEDURAL STATES: Chronic | ICD-10-CM

## 2024-04-09 DIAGNOSIS — Z01.818 ENCOUNTER FOR OTHER PREPROCEDURAL EXAMINATION: ICD-10-CM

## 2024-04-09 DIAGNOSIS — I48.19 OTHER PERSISTENT ATRIAL FIBRILLATION: ICD-10-CM

## 2024-04-09 DIAGNOSIS — Z98.1 ARTHRODESIS STATUS: Chronic | ICD-10-CM

## 2024-04-09 LAB
APPEARANCE UR: CLEAR — SIGNIFICANT CHANGE UP
BASOPHILS # BLD AUTO: 0.05 K/UL — SIGNIFICANT CHANGE UP (ref 0–0.2)
BASOPHILS NFR BLD AUTO: 0.8 % — SIGNIFICANT CHANGE UP (ref 0–1)
BILIRUB UR-MCNC: NEGATIVE — SIGNIFICANT CHANGE UP
BLD GP AB SCN SERPL QL: SIGNIFICANT CHANGE UP
COLOR SPEC: SIGNIFICANT CHANGE UP
DIFF PNL FLD: NEGATIVE — SIGNIFICANT CHANGE UP
EOSINOPHIL # BLD AUTO: 0.11 K/UL — SIGNIFICANT CHANGE UP (ref 0–0.7)
EOSINOPHIL NFR BLD AUTO: 1.8 % — SIGNIFICANT CHANGE UP (ref 0–8)
GLUCOSE UR QL: NEGATIVE MG/DL — SIGNIFICANT CHANGE UP
HCT VFR BLD CALC: 42 % — SIGNIFICANT CHANGE UP (ref 37–47)
HGB BLD-MCNC: 13.6 G/DL — SIGNIFICANT CHANGE UP (ref 12–16)
IMM GRANULOCYTES NFR BLD AUTO: 0.3 % — SIGNIFICANT CHANGE UP (ref 0.1–0.3)
KETONES UR-MCNC: ABNORMAL MG/DL
LEUKOCYTE ESTERASE UR-ACNC: NEGATIVE — SIGNIFICANT CHANGE UP
LYMPHOCYTES # BLD AUTO: 1.57 K/UL — SIGNIFICANT CHANGE UP (ref 1.2–3.4)
LYMPHOCYTES # BLD AUTO: 25.4 % — SIGNIFICANT CHANGE UP (ref 20.5–51.1)
MCHC RBC-ENTMCNC: 29.2 PG — SIGNIFICANT CHANGE UP (ref 27–31)
MCHC RBC-ENTMCNC: 32.4 G/DL — SIGNIFICANT CHANGE UP (ref 32–37)
MCV RBC AUTO: 90.3 FL — SIGNIFICANT CHANGE UP (ref 81–99)
MONOCYTES # BLD AUTO: 0.6 K/UL — SIGNIFICANT CHANGE UP (ref 0.1–0.6)
MONOCYTES NFR BLD AUTO: 9.7 % — HIGH (ref 1.7–9.3)
MRSA PCR RESULT.: NEGATIVE — SIGNIFICANT CHANGE UP
NEUTROPHILS # BLD AUTO: 3.84 K/UL — SIGNIFICANT CHANGE UP (ref 1.4–6.5)
NEUTROPHILS NFR BLD AUTO: 62 % — SIGNIFICANT CHANGE UP (ref 42.2–75.2)
NITRITE UR-MCNC: NEGATIVE — SIGNIFICANT CHANGE UP
NRBC # BLD: 0 /100 WBCS — SIGNIFICANT CHANGE UP (ref 0–0)
PH UR: 5.5 — SIGNIFICANT CHANGE UP (ref 5–8)
PLATELET # BLD AUTO: 265 K/UL — SIGNIFICANT CHANGE UP (ref 130–400)
PMV BLD: 12.8 FL — HIGH (ref 7.4–10.4)
PROT UR-MCNC: SIGNIFICANT CHANGE UP MG/DL
RBC # BLD: 4.65 M/UL — SIGNIFICANT CHANGE UP (ref 4.2–5.4)
RBC # FLD: 12.9 % — SIGNIFICANT CHANGE UP (ref 11.5–14.5)
SP GR SPEC: 1.03 — SIGNIFICANT CHANGE UP (ref 1–1.03)
UROBILINOGEN FLD QL: 1 MG/DL — SIGNIFICANT CHANGE UP (ref 0.2–1)
WBC # BLD: 6.19 K/UL — SIGNIFICANT CHANGE UP (ref 4.8–10.8)
WBC # FLD AUTO: 6.19 K/UL — SIGNIFICANT CHANGE UP (ref 4.8–10.8)

## 2024-04-09 PROCEDURE — 85025 COMPLETE CBC W/AUTO DIFF WBC: CPT

## 2024-04-09 PROCEDURE — 86900 BLOOD TYPING SEROLOGIC ABO: CPT

## 2024-04-09 PROCEDURE — 80053 COMPREHEN METABOLIC PANEL: CPT

## 2024-04-09 PROCEDURE — 86901 BLOOD TYPING SEROLOGIC RH(D): CPT

## 2024-04-09 PROCEDURE — 81003 URINALYSIS AUTO W/O SCOPE: CPT

## 2024-04-09 PROCEDURE — 93005 ELECTROCARDIOGRAM TRACING: CPT

## 2024-04-09 PROCEDURE — 36415 COLL VENOUS BLD VENIPUNCTURE: CPT

## 2024-04-09 PROCEDURE — 87641 MR-STAPH DNA AMP PROBE: CPT

## 2024-04-09 PROCEDURE — 86850 RBC ANTIBODY SCREEN: CPT

## 2024-04-09 PROCEDURE — 93010 ELECTROCARDIOGRAM REPORT: CPT

## 2024-04-09 PROCEDURE — 99214 OFFICE O/P EST MOD 30 MIN: CPT | Mod: 25

## 2024-04-09 PROCEDURE — 87640 STAPH A DNA AMP PROBE: CPT

## 2024-04-09 PROCEDURE — 87086 URINE CULTURE/COLONY COUNT: CPT

## 2024-04-09 NOTE — H&P PST ADULT - NSICDXPASTSURGICALHX_GEN_ALL_CORE_FT
PAST SURGICAL HISTORY:  H/O abdominal hysterectomy     H/O cerebral aneurysm repair     H/O craniotomy     H/O Spinal surgery tumor removed plates in neck    History of cardioversion     History of fusion of cervical spine

## 2024-04-09 NOTE — H&P PST ADULT - HISTORY OF PRESENT ILLNESS
Patient is a 63 year old female presenting to PAST in preparation for  AMULET/JACQUI  on 4/23  under gen  anesthesia by Dr. Bauman  Reports h/o seizures and cerebral aneurysms was advised that she was a candidate for the above procedure    PATIENT CURRENTLY DENIES CHEST PAIN  SHORTNESS OF BREATH  PALPITATIONS,  DYSURIA, OR UPPER RESPIRATORY INFECTION IN PAST 2 WEEKS    Anesthesia Alert  NO--Difficult Airway  NO--History of neck surgery or radiation  NO--Limited ROM of neck  NO--History of Malignant hyperthermia  NO--Personal or family history of Pseudocholinesterase deficiency  NO--Prior Anesthesia Complication  NO--Latex Allergy  NO--Loose teeth  NO--History of Rheumatoid Arthritis  NO--KRISTIAN  yes-- BLEEDING RISK( takes eliquis daily)  NO--Other_____      DASI  44.2 points  The higher the score (maximum 58.2), the higher the functional status.  8.17 METs      RCRI  0 points  Class I Risk  3.9 %  30-day risk of death, MI, or cardiac arrest      As per patient, this is their complete medical and surgical history, including medications both prescribed or over the counter.  Patient verbalized understanding of instructions and was given the opportunity to ask questions and have them answered.

## 2024-04-10 DIAGNOSIS — Z01.818 ENCOUNTER FOR OTHER PREPROCEDURAL EXAMINATION: ICD-10-CM

## 2024-04-10 DIAGNOSIS — I48.19 OTHER PERSISTENT ATRIAL FIBRILLATION: ICD-10-CM

## 2024-04-10 LAB
ALBUMIN SERPL ELPH-MCNC: 4.1 G/DL — SIGNIFICANT CHANGE UP (ref 3.5–5.2)
ALP SERPL-CCNC: 114 U/L — SIGNIFICANT CHANGE UP (ref 30–115)
ALT FLD-CCNC: 10 U/L — SIGNIFICANT CHANGE UP (ref 0–41)
ANION GAP SERPL CALC-SCNC: 14 MMOL/L — SIGNIFICANT CHANGE UP (ref 7–14)
AST SERPL-CCNC: 14 U/L — SIGNIFICANT CHANGE UP (ref 0–41)
BILIRUB SERPL-MCNC: 0.4 MG/DL — SIGNIFICANT CHANGE UP (ref 0.2–1.2)
BUN SERPL-MCNC: 14 MG/DL — SIGNIFICANT CHANGE UP (ref 10–20)
CALCIUM SERPL-MCNC: 9.5 MG/DL — SIGNIFICANT CHANGE UP (ref 8.4–10.5)
CHLORIDE SERPL-SCNC: 104 MMOL/L — SIGNIFICANT CHANGE UP (ref 98–110)
CO2 SERPL-SCNC: 27 MMOL/L — SIGNIFICANT CHANGE UP (ref 17–32)
CREAT SERPL-MCNC: 1.2 MG/DL — SIGNIFICANT CHANGE UP (ref 0.7–1.5)
EGFR: 51 ML/MIN/1.73M2 — LOW
GLUCOSE SERPL-MCNC: 87 MG/DL — SIGNIFICANT CHANGE UP (ref 70–99)
POTASSIUM SERPL-MCNC: 4.6 MMOL/L — SIGNIFICANT CHANGE UP (ref 3.5–5)
POTASSIUM SERPL-SCNC: 4.6 MMOL/L — SIGNIFICANT CHANGE UP (ref 3.5–5)
PROT SERPL-MCNC: 7 G/DL — SIGNIFICANT CHANGE UP (ref 6–8)
SODIUM SERPL-SCNC: 145 MMOL/L — SIGNIFICANT CHANGE UP (ref 135–146)

## 2024-04-11 LAB
CULTURE RESULTS: SIGNIFICANT CHANGE UP
SPECIMEN SOURCE: SIGNIFICANT CHANGE UP

## 2024-04-16 ENCOUNTER — APPOINTMENT (OUTPATIENT)
Dept: ORTHOPEDIC SURGERY | Facility: CLINIC | Age: 64
End: 2024-04-16
Payer: COMMERCIAL

## 2024-04-16 DIAGNOSIS — S93.409A SPRAIN OF UNSPECIFIED LIGAMENT OF UNSPECIFIED ANKLE, INITIAL ENCOUNTER: ICD-10-CM

## 2024-04-16 DIAGNOSIS — G89.29 CERVICALGIA: ICD-10-CM

## 2024-04-16 DIAGNOSIS — M54.2 CERVICALGIA: ICD-10-CM

## 2024-04-16 DIAGNOSIS — M51.9 UNSPECIFIED THORACIC, THORACOLUMBAR AND LUMBOSACRAL INTERVERTEBRAL DISC DISORDER: ICD-10-CM

## 2024-04-16 DIAGNOSIS — M17.0 BILATERAL PRIMARY OSTEOARTHRITIS OF KNEE: ICD-10-CM

## 2024-04-16 DIAGNOSIS — G89.29 PAIN IN RIGHT FOOT: ICD-10-CM

## 2024-04-16 DIAGNOSIS — M79.671 PAIN IN RIGHT FOOT: ICD-10-CM

## 2024-04-16 DIAGNOSIS — Z98.1 ARTHRODESIS STATUS: ICD-10-CM

## 2024-04-16 PROCEDURE — 99213 OFFICE O/P EST LOW 20 MIN: CPT

## 2024-04-16 NOTE — REASON FOR VISIT
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

## 2024-04-16 NOTE — DATA REVIEWED
[FreeTextEntry1] : X-ray images were obtained [facility] and viewed on PACS here.  AP, lateral, oblique views of bilateral feet and ankles reveal no acute fractures, discussions, bony abnormalities.  AP, lateral, oblique views of bilateral knees reveal no acute fractures, dislocations, tricompartmental mild to moderate degenerative changes bilaterally

## 2024-04-16 NOTE — HISTORY OF PRESENT ILLNESS
[de-identified] : 63-year-old female presents with bilateral knees and bilateral ankle pain.  Patient fell on 8/30/2023.  Patient was walking and fell on a broken sidewalk.  Patient has had pain in the posterior knees and on the outside of both foot and ankles.  Patient went to [facility] emergency room, where x-rays were taken and read as negative for fractures in the bilateral knees, bilateral foot and ankles.  Patient has been wearing compression, taking anti-inflammatories, and icing for pain relief.  Patient has seen Dr. Orozco for orthopedic concerns in the past.

## 2024-04-16 NOTE — ASSESSMENT
[FreeTextEntry1] :  she is on Eliquis hopefully when the watchman is placed may be able to do NSAID Will need to talk to her gastroenterologist due to her Johnson's return in in 3 months pain medication was refilled If she gets off the Eliquis call me

## 2024-04-16 NOTE — IMAGING
[de-identified] : MRI cervical spine 4/30/2021 status post C5-6 ACDF with multilevel spondylosis slightly increased changes from study 4/7/2011

## 2024-04-16 NOTE — PHYSICAL EXAM
[de-identified] : Physical exam of bilateral knees: -No erythema, edema, ecchymosis present. Superficial abrasions noted over left knee healed.  Skin intact -TTP popliteal regions of both knees -Calf is soft and nontender bilaterally -Positive Cony's laterally, negative anterior drawer, patient able to straight leg raise -Varus and valgus stability -Full ROM with no pain -+2 posterior tibialis pulse -Sensation intact to light touch   Physical exam of the bilateral ankles/feet: -Small area of ecchymosis noted over right foot laterally dorsal surface.  Skin intact -Of the right ankle and foot, patient has tenderness over the diffuse dorsal aspect of foot as well as the ATFL and PT FL. -Of the left foot and ankle, patient has tenderness to palpation over the big toe, first metatarsal and second toe.  No tenderness in the ankle joint -Patient has full range of motion of the foot and ankle, some pain in the right ankle with range of motion -+2 dorsalis pedis pulse  -Sensation intact to light touch

## 2024-04-17 ENCOUNTER — RX RENEWAL (OUTPATIENT)
Age: 64
End: 2024-04-17

## 2024-04-23 ENCOUNTER — APPOINTMENT (OUTPATIENT)
Dept: ELECTROPHYSIOLOGY | Facility: HOSPITAL | Age: 64
End: 2024-04-23

## 2024-04-23 ENCOUNTER — RESULT REVIEW (OUTPATIENT)
Age: 64
End: 2024-04-23

## 2024-04-23 ENCOUNTER — INPATIENT (INPATIENT)
Facility: HOSPITAL | Age: 64
LOS: 0 days | Discharge: HOME | DRG: 310 | End: 2024-04-24
Attending: STUDENT IN AN ORGANIZED HEALTH CARE EDUCATION/TRAINING PROGRAM | Admitting: STUDENT IN AN ORGANIZED HEALTH CARE EDUCATION/TRAINING PROGRAM
Payer: COMMERCIAL

## 2024-04-23 ENCOUNTER — TRANSCRIPTION ENCOUNTER (OUTPATIENT)
Age: 64
End: 2024-04-23

## 2024-04-23 VITALS
TEMPERATURE: 98 F | SYSTOLIC BLOOD PRESSURE: 175 MMHG | HEART RATE: 65 BPM | DIASTOLIC BLOOD PRESSURE: 83 MMHG | OXYGEN SATURATION: 98 % | WEIGHT: 225.09 LBS | RESPIRATION RATE: 16 BRPM | HEIGHT: 67.72 IN

## 2024-04-23 DIAGNOSIS — Z98.890 OTHER SPECIFIED POSTPROCEDURAL STATES: Chronic | ICD-10-CM

## 2024-04-23 DIAGNOSIS — Z92.89 PERSONAL HISTORY OF OTHER MEDICAL TREATMENT: Chronic | ICD-10-CM

## 2024-04-23 DIAGNOSIS — Z98.1 ARTHRODESIS STATUS: Chronic | ICD-10-CM

## 2024-04-23 DIAGNOSIS — I48.19 OTHER PERSISTENT ATRIAL FIBRILLATION: ICD-10-CM

## 2024-04-23 PROCEDURE — 93355 ECHO TRANSESOPHAGEAL (TEE): CPT

## 2024-04-23 PROCEDURE — 93005 ELECTROCARDIOGRAM TRACING: CPT

## 2024-04-23 PROCEDURE — 33340 PERQ CLSR TCAT L ATR APNDGE: CPT | Mod: Q0

## 2024-04-23 PROCEDURE — 93662 INTRACARDIAC ECG (ICE): CPT

## 2024-04-23 PROCEDURE — C9399: CPT

## 2024-04-23 RX ORDER — INFLUENZA VIRUS VACCINE 15; 15; 15; 15 UG/.5ML; UG/.5ML; UG/.5ML; UG/.5ML
0.5 SUSPENSION INTRAMUSCULAR ONCE
Refills: 0 | Status: DISCONTINUED | OUTPATIENT
Start: 2024-04-23 | End: 2024-04-24

## 2024-04-23 RX ORDER — ATORVASTATIN CALCIUM 80 MG/1
10 TABLET, FILM COATED ORAL AT BEDTIME
Refills: 0 | Status: DISCONTINUED | OUTPATIENT
Start: 2024-04-23 | End: 2024-04-24

## 2024-04-23 RX ORDER — BENZOCAINE 10 %
1 GEL (GRAM) MUCOUS MEMBRANE ONCE
Refills: 0 | Status: COMPLETED | OUTPATIENT
Start: 2024-04-23 | End: 2024-04-23

## 2024-04-23 RX ORDER — ASPIRIN/CALCIUM CARB/MAGNESIUM 324 MG
1 TABLET ORAL
Qty: 30 | Refills: 0
Start: 2024-04-23 | End: 2024-05-22

## 2024-04-23 RX ORDER — ACETAMINOPHEN 500 MG
650 TABLET ORAL EVERY 6 HOURS
Refills: 0 | Status: DISCONTINUED | OUTPATIENT
Start: 2024-04-23 | End: 2024-04-24

## 2024-04-23 RX ORDER — CLOPIDOGREL BISULFATE 75 MG/1
1 TABLET, FILM COATED ORAL
Qty: 30 | Refills: 11
Start: 2024-04-23 | End: 2025-04-17

## 2024-04-23 RX ORDER — PANTOPRAZOLE SODIUM 20 MG/1
40 TABLET, DELAYED RELEASE ORAL
Refills: 0 | Status: DISCONTINUED | OUTPATIENT
Start: 2024-04-23 | End: 2024-04-24

## 2024-04-23 RX ORDER — METOPROLOL TARTRATE 50 MG
25 TABLET ORAL DAILY
Refills: 0 | Status: DISCONTINUED | OUTPATIENT
Start: 2024-04-23 | End: 2024-04-24

## 2024-04-23 RX ORDER — ASPIRIN/CALCIUM CARB/MAGNESIUM 324 MG
81 TABLET ORAL DAILY
Refills: 0 | Status: DISCONTINUED | OUTPATIENT
Start: 2024-04-24 | End: 2024-04-24

## 2024-04-23 RX ORDER — ACETAMINOPHEN 500 MG
650 TABLET ORAL EVERY 6 HOURS
Refills: 0 | Status: DISCONTINUED | OUTPATIENT
Start: 2024-04-23 | End: 2024-04-23

## 2024-04-23 RX ORDER — LEVOTHYROXINE SODIUM 125 MCG
300 TABLET ORAL DAILY
Refills: 0 | Status: DISCONTINUED | OUTPATIENT
Start: 2024-04-23 | End: 2024-04-24

## 2024-04-23 RX ORDER — LISINOPRIL 2.5 MG/1
40 TABLET ORAL DAILY
Refills: 0 | Status: DISCONTINUED | OUTPATIENT
Start: 2024-04-23 | End: 2024-04-24

## 2024-04-23 RX ORDER — ESCITALOPRAM OXALATE 10 MG/1
10 TABLET, FILM COATED ORAL DAILY
Refills: 0 | Status: DISCONTINUED | OUTPATIENT
Start: 2024-04-23 | End: 2024-04-24

## 2024-04-23 RX ORDER — LANOLIN ALCOHOL/MO/W.PET/CERES
5 CREAM (GRAM) TOPICAL AT BEDTIME
Refills: 0 | Status: DISCONTINUED | OUTPATIENT
Start: 2024-04-23 | End: 2024-04-24

## 2024-04-23 RX ORDER — LEVETIRACETAM 250 MG/1
750 TABLET, FILM COATED ORAL
Refills: 0 | Status: DISCONTINUED | OUTPATIENT
Start: 2024-04-23 | End: 2024-04-24

## 2024-04-23 RX ORDER — ASPIRIN/CALCIUM CARB/MAGNESIUM 324 MG
325 TABLET ORAL ONCE
Refills: 0 | Status: COMPLETED | OUTPATIENT
Start: 2024-04-23 | End: 2024-04-23

## 2024-04-23 RX ORDER — CLOPIDOGREL BISULFATE 75 MG/1
75 TABLET, FILM COATED ORAL DAILY
Refills: 0 | Status: DISCONTINUED | OUTPATIENT
Start: 2024-04-23 | End: 2024-04-24

## 2024-04-23 RX ADMIN — Medication 5 MILLIGRAM(S): at 21:20

## 2024-04-23 RX ADMIN — ATORVASTATIN CALCIUM 10 MILLIGRAM(S): 80 TABLET, FILM COATED ORAL at 21:20

## 2024-04-23 RX ADMIN — Medication 325 MILLIGRAM(S): at 14:52

## 2024-04-23 RX ADMIN — Medication 650 MILLIGRAM(S): at 21:20

## 2024-04-23 RX ADMIN — LEVETIRACETAM 750 MILLIGRAM(S): 250 TABLET, FILM COATED ORAL at 18:12

## 2024-04-23 RX ADMIN — Medication 1 SPRAY(S): at 20:04

## 2024-04-23 RX ADMIN — CLOPIDOGREL BISULFATE 75 MILLIGRAM(S): 75 TABLET, FILM COATED ORAL at 14:53

## 2024-04-23 RX ADMIN — Medication 650 MILLIGRAM(S): at 15:42

## 2024-04-23 NOTE — PRE-ANESTHESIA EVALUATION ADULT - NSANTHOSAYNRD_GEN_A_CORE
denies/No. KRISTIAN screening performed.  STOP BANG Legend: 0-2 = LOW Risk; 3-4 = INTERMEDIATE Risk; 5-8 = HIGH Risk

## 2024-04-23 NOTE — ASU PATIENT PROFILE, ADULT - FALL HARM RISK - UNIVERSAL INTERVENTIONS
Bed in lowest position, wheels locked, appropriate side rails in place/Call bell, personal items and telephone in reach/Instruct patient to call for assistance before getting out of bed or chair/Non-slip footwear when patient is out of bed/[place] to call system/Physically safe environment - no spills, clutter or unnecessary equipment/Purposeful Proactive Rounding/Room/bathroom lighting operational, light cord in reach

## 2024-04-23 NOTE — ASU PREOP CHECKLIST - TEMPERATURE IN CELSIUS (DEGREES C)
36.7 Griseofulvin Pregnancy And Lactation Text: This medication is Pregnancy Category X and is known to cause serious birth defects. It is unknown if this medication is excreted in breast milk but breast feeding should be avoided.

## 2024-04-23 NOTE — DISCHARGE NOTE PROVIDER - ATTENDING DISCHARGE PHYSICAL EXAMINATION:
Patient seen and examined. Pertinent labs, imaging and telemetry reviewed. I agree with the above:     Patient feeling well. Some palpitations overnight.   RRR with ectopic beats. S1S2 present.   CTA B/L   Right groin site CDI without hematoma.     Patient is stable for discharge home with outpatient follow up.

## 2024-04-23 NOTE — PROGRESS NOTE ADULT - SUBJECTIVE AND OBJECTIVE BOX
Electrophysiology Brief Post-Op Note    I have personally seen and examined the patient.  I agree with the history and physical which I have reviewed and noted any changes below.  04-23-24 @ 11:30 AM    PRE-OP DIAGNOSIS: AFib, seizures    POST-OP DIAGNOSIS: AFib, seizures    PROCEDURE: NITZA Occlusion via Amulet Device    Physician: Jazlyn Bauman MD  Assistant: Gisela Toro MD    JACQUI done by: Rosaline Eric MD    ESTIMATED BLOOD LOSS:   30 mL    ANESTHESIA TYPE:  [ x ] General Anesthesia  [  ] Sedation  [ x ] Local/Regional    CONDITION  [  ] Critical  [  ] Serious  [  ] Fair  [ x ] Good      SPECIMENS REMOVED (IF APPLICABLE): None    IMPLANTS (IF APPLICABLE): 28 mm Amulet    FINDINGS: AFib, seizures    COMPLICATIONS: None    PLAN OF CARE  - Bedrest overnight  - Groin suture to be removed in the morning   - Aspirin 325 mg today then aspirin 81 mg daily starting tomorrow. Plavix 75 mg daily for 6 mo.   - Observe on tele overnight  - Follow up with  me in 3 weeks at [address] [place] [address] ([phone])    Jazlyn Bauman MD   Electrophysiology Attending

## 2024-04-23 NOTE — DISCHARGE NOTE PROVIDER - NSDCCPTREATMENT_GEN_ALL_CORE_FT
PRINCIPAL PROCEDURE  Procedure: Insrt left atrial appendge dev  Findings and Treatment:   - Please start taking aspirin 81 mg daily and Plavix 75mg PO Daily  - You should stop eliquis  - You may shower today.  - Do not drive or operate heavy machinery for 3 days.  - Do not submerge in water (example: baths, swimming) for 1 week.  - No squatting, exertional activities, or lifting anything > 10 lbs for 1 week.  - Any sudden swelling, redness, fever, discharge, or severe pain, call the Electrophysiology Office at [phone].

## 2024-04-23 NOTE — PATIENT PROFILE ADULT - CAREGIVER NAME
Since the Flexeril/cyclobenzaprine did not help might as well stop taking it  Continue with physical therapy  Will obtain approval from insurance company to inject left knee with Synvisc-One/hyaluronic injections-rooster comb gel  Continue Aspercreme with lidocaine 3 to 4 times a day apply 2 in at least  Return to clinic in 2 weeks to inject Synvisc-One  Cannot take anti-inflammatories because on blood thinners  
Bryan Spangler

## 2024-04-23 NOTE — DISCHARGE NOTE PROVIDER - CARE PROVIDER_API CALL
Jazlyn Bauman  Cardiovascular Disease  [address] [place] [id number]  Phone: [phone]  Fax: [phone]  Established Patient  Follow Up Time: 1 month

## 2024-04-23 NOTE — DISCHARGE NOTE PROVIDER - HOSPITAL COURSE
Patient is a 63 year old female with PMHx of HTN, HLD, Paroxysmal AF, Cerebral AVM in the past s/p Craniotomy with 7 prior TIAs who presented to [facility] for elective NITZA occlusive device implantation. On 4/23/24 patient underwent successful amulet implantation. Patient was monitored overnight. POD 1 patient HD Stable with no complaints. Patient is being DC home in stable condition. Patient will be DC home on ASA 81mg PO Daily and Plavix 75mg PO Daily

## 2024-04-23 NOTE — DISCHARGE NOTE PROVIDER - NSDCFUSCHEDAPPT_GEN_ALL_CORE_FT
Jazlyn Bauman  [facility]  ELECTROPH [address]  Scheduled Appointment: 05/22/2024    Adrián Alex  [facility]  ENDOCRIN [address]  Scheduled Appointment: 07/15/2024     Jazlyn Bauman  [facility]  ELECTROPH [address]  Scheduled Appointment: 05/22/2024    Adrián Alex  [facility]  ENDOCRIN [address]  Scheduled Appointment: 07/15/2024    Beau Orozco  [facility]  ONCORTHO [address]  Scheduled Appointment: 07/23/2024

## 2024-04-23 NOTE — DISCHARGE NOTE PROVIDER - NSDCMRMEDTOKEN_GEN_ALL_CORE_FT
aspirin 81 mg oral tablet, chewable: 1 tab(s) orally once a day  atorvastatin 10 mg oral tablet: 1 tab(s) orally once a day (at bedtime)  cholecalciferol oral tablet: 5000 unit(s) orally once a day  clopidogrel 75 mg oral tablet: 1 tab(s) orally once a day  escitalopram 10 mg oral tablet: 1 tab(s) orally once a day  levETIRAcetam 750 mg oral tablet: 2 tab(s) orally 2 times a day  levothyroxine 300 mcg (0.3 mg) oral tablet: 1 tab(s) orally once a day  metoprolol succinate 25 mg oral tablet, extended release: 1 tab(s) orally once a day  pantoprazole 40 mg oral delayed release tablet: 1 tab(s) orally once a day (before a meal)  Percocet 10 mg-325 mg oral tablet: 1 tab(s) orally once a day  Premarin 0.625 mg oral tablet: 1 tab(s) orally once a day  ramipril 10 mg oral capsule: 1 cap(s) orally once a day

## 2024-04-24 ENCOUNTER — TRANSCRIPTION ENCOUNTER (OUTPATIENT)
Age: 64
End: 2024-04-24

## 2024-04-24 VITALS
TEMPERATURE: 98 F | DIASTOLIC BLOOD PRESSURE: 85 MMHG | RESPIRATION RATE: 18 BRPM | SYSTOLIC BLOOD PRESSURE: 121 MMHG | OXYGEN SATURATION: 94 % | HEART RATE: 97 BPM

## 2024-04-24 PROCEDURE — 93010 ELECTROCARDIOGRAM REPORT: CPT

## 2024-04-24 PROCEDURE — 99239 HOSP IP/OBS DSCHRG MGMT >30: CPT

## 2024-04-24 PROCEDURE — 99232 SBSQ HOSP IP/OBS MODERATE 35: CPT

## 2024-04-24 RX ADMIN — Medication 650 MILLIGRAM(S): at 11:39

## 2024-04-24 RX ADMIN — Medication 25 MILLIGRAM(S): at 05:46

## 2024-04-24 RX ADMIN — Medication 300 MICROGRAM(S): at 05:46

## 2024-04-24 RX ADMIN — Medication 650 MILLIGRAM(S): at 11:00

## 2024-04-24 RX ADMIN — Medication 81 MILLIGRAM(S): at 11:33

## 2024-04-24 RX ADMIN — ESCITALOPRAM OXALATE 10 MILLIGRAM(S): 10 TABLET, FILM COATED ORAL at 11:33

## 2024-04-24 RX ADMIN — LISINOPRIL 40 MILLIGRAM(S): 2.5 TABLET ORAL at 05:46

## 2024-04-24 RX ADMIN — LEVETIRACETAM 750 MILLIGRAM(S): 250 TABLET, FILM COATED ORAL at 05:46

## 2024-04-24 RX ADMIN — PANTOPRAZOLE SODIUM 40 MILLIGRAM(S): 20 TABLET, DELAYED RELEASE ORAL at 05:46

## 2024-04-24 RX ADMIN — CLOPIDOGREL BISULFATE 75 MILLIGRAM(S): 75 TABLET, FILM COATED ORAL at 11:33

## 2024-04-24 NOTE — PROGRESS NOTE ADULT - SUBJECTIVE AND OBJECTIVE BOX
INTERVAL HPI/OVERNIGHT EVENTS:  Patient having episodes of tachycardia overnight with mild palpitations  SP Amulet Implant POD#1  HD stable    MEDICATIONS  (STANDING):  aspirin  chewable 81 milliGRAM(s) Oral daily  atorvastatin 10 milliGRAM(s) Oral at bedtime  clopidogrel Tablet 75 milliGRAM(s) Oral daily  escitalopram 10 milliGRAM(s) Oral daily  influenza   Vaccine 0.5 milliLiter(s) IntraMuscular once  levETIRAcetam 750 milliGRAM(s) Oral two times a day  levothyroxine 300 MICROGram(s) Oral daily  lisinopril 40 milliGRAM(s) Oral daily  melatonin 5 milliGRAM(s) Oral at bedtime  metoprolol succinate ER 25 milliGRAM(s) Oral daily  pantoprazole    Tablet 40 milliGRAM(s) Oral before breakfast    MEDICATIONS  (PRN):  acetaminophen     Tablet .. 650 milliGRAM(s) Oral every 6 hours PRN Mild Pain (1 - 3)      Allergies    No Known Allergies    Intolerances        REVIEW OF SYSTEMS: No CP, palpitations, dizziness or SOB     Vital Signs Last 24 Hrs  T(C): 36.7 (24 Apr 2024 07:34), Max: 37.6 (23 Apr 2024 23:13)  T(F): 98.1 (24 Apr 2024 07:34), Max: 99.6 (23 Apr 2024 23:13)  HR: 97 (24 Apr 2024 07:34) (65 - 123)  BP: 121/85 (24 Apr 2024 07:34) (104/66 - 175/83)  BP(mean): 100 (24 Apr 2024 07:34) (81 - 106)  RR: 18 (24 Apr 2024 07:34) (13 - 20)  SpO2: 94% (24 Apr 2024 07:34) (94% - 98%)    Parameters below as of 24 Apr 2024 04:00  Patient On (Oxygen Delivery Method): room air        04-23-24 @ 07:01  -  04-24-24 @ 07:00  --------------------------------------------------------  IN: 560 mL / OUT: 1200 mL / NET: -640 mL    04-24-24 @ 07:01 - 04-24-24 @ 10:14  --------------------------------------------------------  IN: 175 mL / OUT: 0 mL / NET: 175 mL    Physical Exam  GENERAL: In no apparent distress, well nourished, and hydrated.  EYES: EOMI, PERRLA, conjunctiva and sclera clear  NECK: Supple  HEART: Regular rate and rhythm; No murmurs, rubs, or gallops.  PULMONARY: Clear to auscultation and perfusion.  No rales, wheezing, or rhonchi bilaterally.  EXTREMITIES:  2+ Peripheral Pulses, No clubbing, cyanosis, or edema  SKIN: Groins healing well  NEUROLOGICAL: Grossly nonfocal     LABS:                RADIOLOGY & ADDITIONAL TESTS:

## 2024-04-24 NOTE — DISCHARGE NOTE NURSING/CASE MANAGEMENT/SOCIAL WORK - PATIENT PORTAL LINK FT
You can access the FollowMyHealth Patient Portal offered by [facility] by registering at the following website: [email or web address]. By joining [employer]’s FollowMyHealth portal, you will also be able to view your health information using other applications (apps) compatible with our system.

## 2024-04-24 NOTE — DISCHARGE NOTE NURSING/CASE MANAGEMENT/SOCIAL WORK - NSDCVIVACCINE_GEN_ALL_CORE_FT
Tdap; 15-Jul-2022 21:00; Rachele Kincaid (RN); [employer]; [id number] (Exp. Date: 12-Jul-2024); IntraMuscular; Deltoid Left.; 0.5 milliLiter(s); VIS (VIS Published: 09-May-2013, VIS Presented: 15-Jul-2022);

## 2024-04-24 NOTE — PRE PROCEDURE NOTE - GENERAL PROCEDURE NAME
Spoke to [facility] [place] to set-up transport for 8AM to [place]   JACQUI, Left Atrial Appendage Occlusion and All Related Procedures

## 2024-04-24 NOTE — PROGRESS NOTE ADULT - ASSESSMENT
63 year old female with PMHx of HTN, HLD, Paroxysmal AF, Cerebral AVM in the past s/p Craniotomy with 7 prior TIAs who presented to [facility] for elective NITZA occlusive device implantation. On 4/23/24 patient underwent successful amulet implantation. POD#1    Impression:  AF sp Amulet Implant  Hx Cerebral AVM  HTN  HLD    Plan:  - Stop Eliquis  - Continue ASA 81mg and Plavix 7mg  - Continue all other home medications  - No heavy lifting or squatting for one week  - Follow up with Dr Bauman in one month

## 2024-04-29 DIAGNOSIS — Z86.79 PERSONAL HISTORY OF OTHER DISEASES OF THE CIRCULATORY SYSTEM: ICD-10-CM

## 2024-04-29 DIAGNOSIS — I10 ESSENTIAL (PRIMARY) HYPERTENSION: ICD-10-CM

## 2024-04-29 DIAGNOSIS — K21.9 GASTRO-ESOPHAGEAL REFLUX DISEASE WITHOUT ESOPHAGITIS: ICD-10-CM

## 2024-04-29 DIAGNOSIS — Z79.899 OTHER LONG TERM (CURRENT) DRUG THERAPY: ICD-10-CM

## 2024-04-29 DIAGNOSIS — I48.19 OTHER PERSISTENT ATRIAL FIBRILLATION: ICD-10-CM

## 2024-04-29 DIAGNOSIS — Z79.890 HORMONE REPLACEMENT THERAPY: ICD-10-CM

## 2024-04-29 DIAGNOSIS — M79.7 FIBROMYALGIA: ICD-10-CM

## 2024-04-29 DIAGNOSIS — Z86.73 PERSONAL HISTORY OF TRANSIENT ISCHEMIC ATTACK (TIA), AND CEREBRAL INFARCTION WITHOUT RESIDUAL DEFICITS: ICD-10-CM

## 2024-04-29 DIAGNOSIS — E03.9 HYPOTHYROIDISM, UNSPECIFIED: ICD-10-CM

## 2024-04-29 DIAGNOSIS — Z79.01 LONG TERM (CURRENT) USE OF ANTICOAGULANTS: ICD-10-CM

## 2024-04-29 DIAGNOSIS — Z85.41 PERSONAL HISTORY OF MALIGNANT NEOPLASM OF CERVIX UTERI: ICD-10-CM

## 2024-04-29 DIAGNOSIS — G40.909 EPILEPSY, UNSPECIFIED, NOT INTRACTABLE, WITHOUT STATUS EPILEPTICUS: ICD-10-CM

## 2024-04-29 DIAGNOSIS — E78.5 HYPERLIPIDEMIA, UNSPECIFIED: ICD-10-CM

## 2024-04-29 DIAGNOSIS — E66.9 OBESITY, UNSPECIFIED: ICD-10-CM

## 2024-05-22 ENCOUNTER — APPOINTMENT (OUTPATIENT)
Dept: ELECTROPHYSIOLOGY | Facility: CLINIC | Age: 64
End: 2024-05-22
Payer: COMMERCIAL

## 2024-05-22 VITALS
BODY MASS INDEX: 34.1 KG/M2 | WEIGHT: 225 LBS | TEMPERATURE: 97.5 F | HEIGHT: 68 IN | DIASTOLIC BLOOD PRESSURE: 85 MMHG | HEART RATE: 111 BPM | RESPIRATION RATE: 15 BRPM | SYSTOLIC BLOOD PRESSURE: 144 MMHG

## 2024-05-22 DIAGNOSIS — R00.2 PALPITATIONS: ICD-10-CM

## 2024-05-22 DIAGNOSIS — I48.0 PAROXYSMAL ATRIAL FIBRILLATION: ICD-10-CM

## 2024-05-22 DIAGNOSIS — I10 ESSENTIAL (PRIMARY) HYPERTENSION: ICD-10-CM

## 2024-05-22 DIAGNOSIS — Z95.818 PRESENCE OF OTHER CARDIAC IMPLANTS AND GRAFTS: ICD-10-CM

## 2024-05-22 PROCEDURE — 99215 OFFICE O/P EST HI 40 MIN: CPT | Mod: 25

## 2024-05-22 PROCEDURE — G2211 COMPLEX E/M VISIT ADD ON: CPT | Mod: NC

## 2024-05-22 PROCEDURE — 93000 ELECTROCARDIOGRAM COMPLETE: CPT

## 2024-05-22 RX ORDER — ASPIRIN 81 MG
81 TABLET, DELAYED RELEASE (ENTERIC COATED) ORAL
Refills: 0 | Status: ACTIVE | COMMUNITY

## 2024-05-22 RX ORDER — TIRZEPATIDE 2.5 MG/.5ML
2.5 INJECTION, SOLUTION SUBCUTANEOUS
Qty: 4 | Refills: 11 | Status: ACTIVE | COMMUNITY
Start: 2024-03-02

## 2024-05-22 RX ORDER — LEVETIRACETAM 1000 MG/1
1000 TABLET, FILM COATED ORAL DAILY
Refills: 0 | Status: ACTIVE | COMMUNITY

## 2024-05-22 RX ORDER — RAMIPRIL 10 MG/1
10 CAPSULE ORAL
Qty: 180 | Refills: 3 | Status: ACTIVE | COMMUNITY
Start: 2021-07-08

## 2024-05-22 RX ORDER — LEVETIRACETAM 1000 MG/1
1000 TABLET, FILM COATED ORAL TWICE DAILY
Qty: 180 | Refills: 3 | Status: COMPLETED | COMMUNITY
Start: 2022-12-15 | End: 2024-05-22

## 2024-05-22 RX ORDER — CLOPIDOGREL BISULFATE 75 MG/1
75 TABLET, FILM COATED ORAL DAILY
Qty: 90 | Refills: 1 | Status: ACTIVE | COMMUNITY
Start: 1900-01-01 | End: 1900-01-01

## 2024-05-22 RX ORDER — LEVETIRACETAM 500 MG/1
500 TABLET, FILM COATED ORAL TWICE DAILY
Qty: 180 | Refills: 0 | Status: COMPLETED | COMMUNITY
Start: 2022-12-15 | End: 2024-05-22

## 2024-05-22 RX ORDER — APIXABAN 5 MG/1
5 TABLET, FILM COATED ORAL
Qty: 180 | Refills: 3 | Status: COMPLETED | COMMUNITY
Start: 2023-06-09 | End: 2024-05-22

## 2024-05-22 RX ORDER — METOPROLOL SUCCINATE 50 MG/1
50 TABLET, EXTENDED RELEASE ORAL DAILY
Qty: 30 | Refills: 3 | Status: DISCONTINUED | COMMUNITY
Start: 1900-01-01 | End: 2024-05-22

## 2024-05-22 RX ORDER — DRONEDARONE 400 MG/1
400 TABLET, FILM COATED ORAL TWICE DAILY
Qty: 60 | Refills: 3 | Status: ACTIVE | COMMUNITY
Start: 2024-05-22 | End: 1900-01-01

## 2024-05-22 RX ORDER — LEVOTHYROXINE SODIUM 0.3 MG/1
300 TABLET ORAL DAILY
Qty: 90 | Refills: 3 | Status: ACTIVE | COMMUNITY
Start: 2020-08-29

## 2024-05-22 RX ORDER — ATORVASTATIN CALCIUM 20 MG/1
20 TABLET, FILM COATED ORAL
Qty: 90 | Refills: 3 | Status: ACTIVE | COMMUNITY
Start: 2021-07-08

## 2024-05-22 RX ORDER — METOPROLOL SUCCINATE 25 MG/1
25 TABLET, EXTENDED RELEASE ORAL DAILY
Qty: 90 | Refills: 1 | Status: DISCONTINUED | COMMUNITY
Start: 2024-03-13 | End: 2024-05-22

## 2024-05-22 RX ORDER — METOPROLOL SUCCINATE 100 MG/1
100 TABLET, EXTENDED RELEASE ORAL DAILY
Qty: 90 | Refills: 3 | Status: ACTIVE | COMMUNITY
Start: 2024-05-22 | End: 1900-01-01

## 2024-05-22 RX ORDER — OMEPRAZOLE 40 MG/1
40 CAPSULE, DELAYED RELEASE ORAL
Qty: 90 | Refills: 0 | Status: ACTIVE | COMMUNITY
Start: 2020-08-18

## 2024-05-22 RX ORDER — FAMOTIDINE 40 MG/1
40 TABLET, FILM COATED ORAL
Qty: 180 | Refills: 0 | Status: ACTIVE | COMMUNITY
Start: 2020-08-18

## 2024-05-22 NOTE — ASSESSMENT
[FreeTextEntry1] : # Recurrent AFib - Cont Eliquis 5 mg PO BID for CHADs Vasc 4 (HTN, F, TIA).  - S/p cardioversion 11/23. Went back into AF. S/p ablation, now back in AF. S/p NITZA occlusion 4/23/2024. - Denies sleep apnea symptoms.  - Increase BB for better rate control from Metoprolol ER 75 mg daily to 100 mg daily. - Post Amulet JACQUI as well as cardioversion and Multaq in 1 mo  # HTN - BP borderline elevated - Increased Metoprolol Succinate ER as above. Continue Ramipril 10 mg daily. - 2g Na diet enforced  I have also advised the patient to go to the nearest emergency room if she experiences any chest pain, dyspnea, syncope, or has any other compelling symptoms.    Follow up in 6 weeks

## 2024-05-22 NOTE — PHYSICAL EXAM
[Well Developed] : well developed [Well Nourished] : well nourished [No Acute Distress] : no acute distress [Obese] : obese [Normal S1, S2] : normal S1, S2 [No Murmur] : no murmur [Clear Lung Fields] : clear lung fields [Good Air Entry] : good air entry [Soft] : abdomen soft [No Edema] : no edema [No Rash] : no rash [Moves all extremities] : moves all extremities [Normal Speech] : normal speech [Alert and Oriented] : alert and oriented [de-identified] : irregular rate and rhythm [de-identified] : bilateral groins well healed per pt

## 2024-05-22 NOTE — CARDIOLOGY SUMMARY
[de-identified] : 5/22/2024 AFib with RVR ( bpm) 3/13/2024 AFib with cont VR (HR 96 bpm) 1/24/2024 AFib with cont VR (HR 82 bpm) [de-identified] : Lexiscan 10/2023, No ischemia. [de-identified] : 11/14/2023 LVEF 63% Mild LAE (38 ml/m2 ml/m2). mild MR. Mild AI. Mild TR.  [de-identified] : Cryo AF ablation 2/15/2024 Amulet 4/23/2024

## 2024-05-22 NOTE — HISTORY OF PRESENT ILLNESS
[FreeTextEntry1] : Cardio: Dr. Rudolph PCP: Dr. Haynes  62 yo F with h/o HTN, HL, brain tumor, hypothyroidism with recent diagnosis of AFib. 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 Had COVID 4/23. Can have seizures at night. Here for evaluation of NITZA occlusion device.   Orthostatics at initial visit Lying Down /80; HR 71  Sitting Up    /78; HR 80 Standing     /80; HR 90    3/13/2024 Here for routine follow up visit after AF ablation. Had Cryo AF ablation on 2/15/24. Went back into AF with RVR and had palpitations the day after discharge. Took an extra dose of Metoprolol and has been feeling well since with occasional palpitations.   5/22/2024 Here for routine follow up. Still in AF. HR at home ranges from 100-110. She is s/p Amulet 4/23/24. She feels palpitations, but they're mostly manageable. Felt some pain in her chest yesterday evening that felt like stabbing sensation. No dyspnea or syncope. No more seizures. Taking aspirin and plavix without bleeding issues.

## 2024-05-22 NOTE — DISCUSSION/SUMMARY
[FreeTextEntry1] : I discussed with patient the different management strategies of atrial fibrillation including rate control, rhythm control and ablative therapy. I also discussed with the patient in great length the role of anticoagulation in stroke prevention. Patient expressed understanding of the discussion. Patient is interested in restoring sinus rhythm through DC cardioversion.   I explained that she will need a JACQUI prior to cardioversion to rule out thrombus and to assess post Amulet for device thrombus/peridevice leak. I discussed risks and benefits of DC cardioversion including risk of aspiration, skin burn, stroke, and cardiac arrest. She expressed understanding and would like to proceed with cardioversion. My office staff will contact patient the patient with date, time and instructions. [EKG obtained to assist in diagnosis and management of assessed problem(s)] : EKG obtained to assist in diagnosis and management of assessed problem(s)

## 2024-06-05 RX ORDER — OXYCODONE AND ACETAMINOPHEN 5; 325 MG/1; MG/1
5-325 TABLET ORAL 3 TIMES DAILY
Qty: 90 | Refills: 0 | Status: ACTIVE | COMMUNITY
Start: 2022-07-22 | End: 1900-01-01

## 2024-06-17 ENCOUNTER — OUTPATIENT (OUTPATIENT)
Dept: OUTPATIENT SERVICES | Facility: HOSPITAL | Age: 64
LOS: 1 days | End: 2024-06-17
Payer: COMMERCIAL

## 2024-06-17 VITALS
OXYGEN SATURATION: 97 % | RESPIRATION RATE: 16 BRPM | HEART RATE: 98 BPM | TEMPERATURE: 98 F | WEIGHT: 255.52 LBS | DIASTOLIC BLOOD PRESSURE: 90 MMHG | SYSTOLIC BLOOD PRESSURE: 127 MMHG | HEIGHT: 68 IN

## 2024-06-17 DIAGNOSIS — Z98.890 OTHER SPECIFIED POSTPROCEDURAL STATES: Chronic | ICD-10-CM

## 2024-06-17 DIAGNOSIS — Z01.818 ENCOUNTER FOR OTHER PREPROCEDURAL EXAMINATION: ICD-10-CM

## 2024-06-17 DIAGNOSIS — Z98.1 ARTHRODESIS STATUS: Chronic | ICD-10-CM

## 2024-06-17 DIAGNOSIS — Z92.89 PERSONAL HISTORY OF OTHER MEDICAL TREATMENT: Chronic | ICD-10-CM

## 2024-06-17 DIAGNOSIS — I48.19 OTHER PERSISTENT ATRIAL FIBRILLATION: ICD-10-CM

## 2024-06-17 LAB
ALBUMIN SERPL ELPH-MCNC: 4.4 G/DL — SIGNIFICANT CHANGE UP (ref 3.5–5.2)
ALP SERPL-CCNC: 121 U/L — HIGH (ref 30–115)
ALT FLD-CCNC: 14 U/L — SIGNIFICANT CHANGE UP (ref 0–41)
ANION GAP SERPL CALC-SCNC: 15 MMOL/L — HIGH (ref 7–14)
AST SERPL-CCNC: 23 U/L — SIGNIFICANT CHANGE UP (ref 0–41)
BILIRUB SERPL-MCNC: 0.4 MG/DL — SIGNIFICANT CHANGE UP (ref 0.2–1.2)
BUN SERPL-MCNC: 18 MG/DL — SIGNIFICANT CHANGE UP (ref 10–20)
CALCIUM SERPL-MCNC: 9.4 MG/DL — SIGNIFICANT CHANGE UP (ref 8.4–10.5)
CHLORIDE SERPL-SCNC: 104 MMOL/L — SIGNIFICANT CHANGE UP (ref 98–110)
CO2 SERPL-SCNC: 23 MMOL/L — SIGNIFICANT CHANGE UP (ref 17–32)
CREAT SERPL-MCNC: 1 MG/DL — SIGNIFICANT CHANGE UP (ref 0.7–1.5)
EGFR: 63 ML/MIN/1.73M2 — SIGNIFICANT CHANGE UP
GLUCOSE SERPL-MCNC: 98 MG/DL — SIGNIFICANT CHANGE UP (ref 70–99)
HCT VFR BLD CALC: 40.5 % — SIGNIFICANT CHANGE UP (ref 37–47)
HGB BLD-MCNC: 13.5 G/DL — SIGNIFICANT CHANGE UP (ref 12–16)
MCHC RBC-ENTMCNC: 30.1 PG — SIGNIFICANT CHANGE UP (ref 27–31)
MCHC RBC-ENTMCNC: 33.3 G/DL — SIGNIFICANT CHANGE UP (ref 32–37)
MCV RBC AUTO: 90.4 FL — SIGNIFICANT CHANGE UP (ref 81–99)
NRBC # BLD: 0 /100 WBCS — SIGNIFICANT CHANGE UP (ref 0–0)
PLATELET # BLD AUTO: 215 K/UL — SIGNIFICANT CHANGE UP (ref 130–400)
PMV BLD: 11.4 FL — HIGH (ref 7.4–10.4)
POTASSIUM SERPL-MCNC: 4.3 MMOL/L — SIGNIFICANT CHANGE UP (ref 3.5–5)
POTASSIUM SERPL-SCNC: 4.3 MMOL/L — SIGNIFICANT CHANGE UP (ref 3.5–5)
PROT SERPL-MCNC: 7 G/DL — SIGNIFICANT CHANGE UP (ref 6–8)
RBC # BLD: 4.48 M/UL — SIGNIFICANT CHANGE UP (ref 4.2–5.4)
RBC # FLD: 13.1 % — SIGNIFICANT CHANGE UP (ref 11.5–14.5)
SODIUM SERPL-SCNC: 142 MMOL/L — SIGNIFICANT CHANGE UP (ref 135–146)
WBC # BLD: 6.13 K/UL — SIGNIFICANT CHANGE UP (ref 4.8–10.8)
WBC # FLD AUTO: 6.13 K/UL — SIGNIFICANT CHANGE UP (ref 4.8–10.8)

## 2024-06-17 PROCEDURE — 99214 OFFICE O/P EST MOD 30 MIN: CPT | Mod: 25

## 2024-06-17 PROCEDURE — 85027 COMPLETE CBC AUTOMATED: CPT

## 2024-06-17 PROCEDURE — 80053 COMPREHEN METABOLIC PANEL: CPT

## 2024-06-17 PROCEDURE — 36415 COLL VENOUS BLD VENIPUNCTURE: CPT

## 2024-06-18 DIAGNOSIS — I48.19 OTHER PERSISTENT ATRIAL FIBRILLATION: ICD-10-CM

## 2024-06-18 DIAGNOSIS — Z01.818 ENCOUNTER FOR OTHER PREPROCEDURAL EXAMINATION: ICD-10-CM

## 2024-06-24 ENCOUNTER — RESULT REVIEW (OUTPATIENT)
Age: 64
End: 2024-06-24

## 2024-06-24 ENCOUNTER — OUTPATIENT (OUTPATIENT)
Dept: OUTPATIENT SERVICES | Facility: HOSPITAL | Age: 64
LOS: 1 days | Discharge: HOME | End: 2024-06-24
Payer: COMMERCIAL

## 2024-06-24 VITALS — HEIGHT: 65 IN | WEIGHT: 225.09 LBS

## 2024-06-24 DIAGNOSIS — Z98.890 OTHER SPECIFIED POSTPROCEDURAL STATES: Chronic | ICD-10-CM

## 2024-06-24 DIAGNOSIS — Z98.1 ARTHRODESIS STATUS: Chronic | ICD-10-CM

## 2024-06-24 DIAGNOSIS — I48.19 OTHER PERSISTENT ATRIAL FIBRILLATION: ICD-10-CM

## 2024-06-24 DIAGNOSIS — Z92.89 PERSONAL HISTORY OF OTHER MEDICAL TREATMENT: Chronic | ICD-10-CM

## 2024-06-24 PROCEDURE — 93312 ECHO TRANSESOPHAGEAL: CPT | Mod: 26,XU

## 2024-06-24 PROCEDURE — 93320 DOPPLER ECHO COMPLETE: CPT

## 2024-06-24 PROCEDURE — 93325 DOPPLER ECHO COLOR FLOW MAPG: CPT

## 2024-06-24 PROCEDURE — 93325 DOPPLER ECHO COLOR FLOW MAPG: CPT | Mod: 26

## 2024-06-24 PROCEDURE — 92960 CARDIOVERSION ELECTRIC EXT: CPT | Mod: XU

## 2024-06-24 PROCEDURE — 93312 ECHO TRANSESOPHAGEAL: CPT

## 2024-06-24 PROCEDURE — 93320 DOPPLER ECHO COMPLETE: CPT | Mod: 26

## 2024-06-24 PROCEDURE — 92960 CARDIOVERSION ELECTRIC EXT: CPT

## 2024-06-24 RX ORDER — OMEPRAZOLE 10 MG/1
1 CAPSULE, DELAYED RELEASE ORAL
Refills: 0 | DISCHARGE

## 2024-06-24 RX ORDER — ESTROGENS, CONJUGATED 0.625 MG
1 TABLET ORAL
Refills: 0 | DISCHARGE

## 2024-06-24 RX ORDER — ONDANSETRON HYDROCHLORIDE 2 MG/ML
1 INJECTION INTRAMUSCULAR; INTRAVENOUS
Refills: 0 | DISCHARGE

## 2024-06-24 RX ORDER — FAMOTIDINE 40 MG
1 TABLET ORAL
Refills: 0 | DISCHARGE

## 2024-06-24 RX ORDER — RAMIPRIL 5 MG
1 CAPSULE ORAL
Refills: 0 | DISCHARGE

## 2024-06-24 RX ORDER — OXYCODONE AND ACETAMINOPHEN 5; 325 MG/1; MG/1
1 TABLET ORAL
Refills: 0 | DISCHARGE

## 2024-06-24 RX ORDER — METOPROLOL TARTRATE 50 MG
1 TABLET ORAL
Refills: 0 | DISCHARGE

## 2024-06-24 RX ORDER — OXCARBAZEPINE 600 MG/1
1 TABLET, FILM COATED ORAL
Refills: 0 | DISCHARGE

## 2024-06-28 DIAGNOSIS — I48.91 UNSPECIFIED ATRIAL FIBRILLATION: ICD-10-CM

## 2024-07-09 ENCOUNTER — APPOINTMENT (OUTPATIENT)
Dept: CARDIOLOGY | Facility: CLINIC | Age: 64
End: 2024-07-09
Payer: COMMERCIAL

## 2024-07-09 VITALS
SYSTOLIC BLOOD PRESSURE: 150 MMHG | OXYGEN SATURATION: 96 % | HEIGHT: 68 IN | HEART RATE: 97 BPM | WEIGHT: 225 LBS | DIASTOLIC BLOOD PRESSURE: 98 MMHG | BODY MASS INDEX: 34.1 KG/M2

## 2024-07-09 DIAGNOSIS — Z86.73 PERSONAL HISTORY OF TRANSIENT ISCHEMIC ATTACK (TIA), AND CEREBRAL INFARCTION W/OUT RESIDUAL DEFICITS: ICD-10-CM

## 2024-07-09 DIAGNOSIS — R06.09 OTHER FORMS OF DYSPNEA: ICD-10-CM

## 2024-07-09 DIAGNOSIS — G40.909 EPILEPSY, UNSPECIFIED, NOT INTRACTABLE, W/OUT STATUS EPILEPTICUS: ICD-10-CM

## 2024-07-09 PROCEDURE — 99213 OFFICE O/P EST LOW 20 MIN: CPT

## 2024-07-09 PROCEDURE — G2211 COMPLEX E/M VISIT ADD ON: CPT | Mod: NC

## 2024-07-13 ENCOUNTER — APPOINTMENT (OUTPATIENT)
Dept: ENDOCRINOLOGY | Facility: CLINIC | Age: 64
End: 2024-07-13
Payer: COMMERCIAL

## 2024-07-13 VITALS
DIASTOLIC BLOOD PRESSURE: 89 MMHG | HEART RATE: 120 BPM | HEIGHT: 68 IN | OXYGEN SATURATION: 96 % | SYSTOLIC BLOOD PRESSURE: 149 MMHG

## 2024-07-13 DIAGNOSIS — E78.00 PURE HYPERCHOLESTEROLEMIA, UNSPECIFIED: ICD-10-CM

## 2024-07-13 DIAGNOSIS — I10 ESSENTIAL (PRIMARY) HYPERTENSION: ICD-10-CM

## 2024-07-13 DIAGNOSIS — D35.2 BENIGN NEOPLASM OF PITUITARY GLAND: ICD-10-CM

## 2024-07-13 DIAGNOSIS — I48.0 PAROXYSMAL ATRIAL FIBRILLATION: ICD-10-CM

## 2024-07-13 DIAGNOSIS — E03.9 HYPOTHYROIDISM, UNSPECIFIED: ICD-10-CM

## 2024-07-13 PROCEDURE — 99214 OFFICE O/P EST MOD 30 MIN: CPT

## 2024-07-13 RX ORDER — PANTOPRAZOLE 40 MG/1
40 TABLET, DELAYED RELEASE ORAL DAILY
Qty: 90 | Refills: 3 | Status: ACTIVE | COMMUNITY
Start: 2024-07-13

## 2024-07-13 RX ORDER — OXCARBAZEPINE 300 MG/1
300 TABLET, FILM COATED ORAL TWICE DAILY
Qty: 180 | Refills: 0 | Status: ACTIVE | COMMUNITY
Start: 2024-07-13

## 2024-07-26 ENCOUNTER — APPOINTMENT (OUTPATIENT)
Dept: ORTHOPEDIC SURGERY | Facility: CLINIC | Age: 64
End: 2024-07-26
Payer: COMMERCIAL

## 2024-07-26 DIAGNOSIS — M54.2 CERVICALGIA: ICD-10-CM

## 2024-07-26 DIAGNOSIS — M17.0 BILATERAL PRIMARY OSTEOARTHRITIS OF KNEE: ICD-10-CM

## 2024-07-26 DIAGNOSIS — Z98.1 ARTHRODESIS STATUS: ICD-10-CM

## 2024-07-26 DIAGNOSIS — G89.29 CERVICALGIA: ICD-10-CM

## 2024-07-26 DIAGNOSIS — G89.29 PAIN IN RIGHT FOOT: ICD-10-CM

## 2024-07-26 DIAGNOSIS — M51.9 UNSPECIFIED THORACIC, THORACOLUMBAR AND LUMBOSACRAL INTERVERTEBRAL DISC DISORDER: ICD-10-CM

## 2024-07-26 DIAGNOSIS — S93.409A SPRAIN OF UNSPECIFIED LIGAMENT OF UNSPECIFIED ANKLE, INITIAL ENCOUNTER: ICD-10-CM

## 2024-07-26 DIAGNOSIS — M79.671 PAIN IN RIGHT FOOT: ICD-10-CM

## 2024-07-26 DIAGNOSIS — M54.12 RADICULOPATHY, CERVICAL REGION: ICD-10-CM

## 2024-07-26 PROCEDURE — 99213 OFFICE O/P EST LOW 20 MIN: CPT

## 2024-07-26 PROCEDURE — 72040 X-RAY EXAM NECK SPINE 2-3 VW: CPT

## 2024-07-26 PROCEDURE — 72100 X-RAY EXAM L-S SPINE 2/3 VWS: CPT

## 2024-09-09 ENCOUNTER — APPOINTMENT (OUTPATIENT)
Dept: ORTHOPEDIC SURGERY | Facility: CLINIC | Age: 64
End: 2024-09-09

## 2024-10-21 ENCOUNTER — RX RENEWAL (OUTPATIENT)
Age: 64
End: 2024-10-21

## 2024-10-21 NOTE — ED ADULT NURSE NOTE - ALCOHOL PRE SCREEN (AUDIT - C)
[facility]  [address]  [place] [address]  Phone [phone]  Fax:  [phone]    Jimmy Smith (:  1955) is a 69 y.o. male here for evaluation of the following chief complaint(s):  Hypertension, Diabetes, and Cholesterol Problem (Review labs refills)    Assessment & Plan   ASSESSMENT/PLAN:  1. Type 2 diabetes with nephropathy (HCC)  -     CBC with Auto Differential; Future  -     Comprehensive Metabolic Panel; Future  -     TSH with Reflex; Future  -     Lipid Panel; Future  -     Hemoglobin A1C; Future  -     Microalbumin / Creatinine Urine Ratio; Future  2. Essential (primary) hypertension  -     CBC with Auto Differential; Future  -     Comprehensive Metabolic Panel; Future  3. Mixed hyperlipidemia  -     Lipid Panel; Future  4. Prostate cancer screening  -     PSA Screening; Future  5. Right knee pain, unspecified chronicity  -     XR KNEE RIGHT (3 VIEWS); Future    Intermittent right medial knee pain past 2 months without injury.  Suspect osteoarthritis, could also have medial meniscus injury.  Check right knee x-ray and try Voltaren gel.    Recent Hemoglobin A1c increased a little to 6.8, diabetes controlled.  Urine microalbumin to creatinine ratio mildly elevated 2024, patient is on ACE inhibitor.  Continue Lantus 20 units daily and metformin 1000 mg twice daily for diabetes.  CMP unremarkable.  Lipid panel normal, continue Lipitor and Zetia.  CBC showed stable very mild anemia with hemoglobin of 13.2.       Blood pressure controlled, continue amlodipine 5 mg and lisinopril 5 mg daily for hypertension.       Last colonoscopy 3/2021 with tubular adenomas, likely needs 3-year repeat.  Have advised patient to repeat colonoscopy screening.  Normal PSA 2024.    Return in about 6 months (around 2025) for routine f/u, labs prior.         Subjective   SUBJECTIVE/OBJECTIVE:  HPI  69-year-old male with PMH of gastric ulcer, HTN, DM2, AMBER, monoclonal gammopathy, and HLD who 
Statement Selected

## 2024-12-18 RX ORDER — DIAZEPAM 5 MG/1
5 TABLET ORAL
Qty: 2 | Refills: 0 | Status: ACTIVE | COMMUNITY
Start: 2024-12-18 | End: 1900-01-01

## 2024-12-20 ENCOUNTER — RESULT REVIEW (OUTPATIENT)
Age: 64
End: 2024-12-20

## 2024-12-20 ENCOUNTER — OUTPATIENT (OUTPATIENT)
Dept: OUTPATIENT SERVICES | Facility: HOSPITAL | Age: 64
LOS: 1 days | End: 2024-12-20
Payer: COMMERCIAL

## 2024-12-20 DIAGNOSIS — Z98.890 OTHER SPECIFIED POSTPROCEDURAL STATES: Chronic | ICD-10-CM

## 2024-12-20 DIAGNOSIS — Z92.89 PERSONAL HISTORY OF OTHER MEDICAL TREATMENT: Chronic | ICD-10-CM

## 2024-12-20 DIAGNOSIS — Z98.1 ARTHRODESIS STATUS: Chronic | ICD-10-CM

## 2024-12-20 DIAGNOSIS — M54.12 RADICULOPATHY, CERVICAL REGION: ICD-10-CM

## 2024-12-20 PROCEDURE — 72141 MRI NECK SPINE W/O DYE: CPT | Mod: 26

## 2024-12-20 PROCEDURE — 72141 MRI NECK SPINE W/O DYE: CPT

## 2024-12-20 PROCEDURE — 72148 MRI LUMBAR SPINE W/O DYE: CPT | Mod: 26

## 2024-12-20 PROCEDURE — 72148 MRI LUMBAR SPINE W/O DYE: CPT

## 2024-12-21 DIAGNOSIS — M54.12 RADICULOPATHY, CERVICAL REGION: ICD-10-CM

## 2025-01-03 ENCOUNTER — APPOINTMENT (OUTPATIENT)
Dept: ORTHOPEDIC SURGERY | Facility: CLINIC | Age: 65
End: 2025-01-03
Payer: COMMERCIAL

## 2025-01-03 DIAGNOSIS — M51.9 UNSPECIFIED THORACIC, THORACOLUMBAR AND LUMBOSACRAL INTERVERTEBRAL DISC DISORDER: ICD-10-CM

## 2025-01-03 DIAGNOSIS — M54.12 RADICULOPATHY, CERVICAL REGION: ICD-10-CM

## 2025-01-03 PROCEDURE — 99203 OFFICE O/P NEW LOW 30 MIN: CPT

## 2025-01-03 PROCEDURE — 72110 X-RAY EXAM L-2 SPINE 4/>VWS: CPT

## 2025-01-07 NOTE — PATIENT PROFILE ADULT - DO YOU LACK THE NECESSARY SUPPORT TO HELP YOU COPE WITH LIFE CHALLENGES?
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  Education provided regarding risk of obstructive sleep apnea.  intravenous induction   Postoperative administration of opioids is intended.  Trial extubation is planned.  Anesthetic plan and risks discussed with patient.    Plan discussed with CAA.       no

## 2025-01-08 ENCOUNTER — APPOINTMENT (OUTPATIENT)
Dept: PAIN MANAGEMENT | Facility: CLINIC | Age: 65
End: 2025-01-08
Payer: COMMERCIAL

## 2025-01-08 ENCOUNTER — APPOINTMENT (OUTPATIENT)
Dept: RADIOLOGY | Facility: CLINIC | Age: 65
End: 2025-01-08
Payer: COMMERCIAL

## 2025-01-08 ENCOUNTER — RESULT CHARGE (OUTPATIENT)
Age: 65
End: 2025-01-08

## 2025-01-08 DIAGNOSIS — G89.29 PAIN IN RIGHT HIP: ICD-10-CM

## 2025-01-08 DIAGNOSIS — M54.16 RADICULOPATHY, LUMBAR REGION: ICD-10-CM

## 2025-01-08 DIAGNOSIS — M25.551 PAIN IN RIGHT HIP: ICD-10-CM

## 2025-01-08 DIAGNOSIS — M51.9 UNSPECIFIED THORACIC, THORACOLUMBAR AND LUMBOSACRAL INTERVERTEBRAL DISC DISORDER: ICD-10-CM

## 2025-01-08 PROCEDURE — 73522 X-RAY EXAM HIPS BI 3-4 VIEWS: CPT

## 2025-01-08 PROCEDURE — 99204 OFFICE O/P NEW MOD 45 MIN: CPT

## 2025-01-10 ENCOUNTER — NON-APPOINTMENT (OUTPATIENT)
Age: 65
End: 2025-01-10

## 2025-01-27 ENCOUNTER — APPOINTMENT (OUTPATIENT)
Dept: ELECTROPHYSIOLOGY | Facility: CLINIC | Age: 65
End: 2025-01-27
Payer: COMMERCIAL

## 2025-01-27 ENCOUNTER — APPOINTMENT (OUTPATIENT)
Dept: CARDIOLOGY | Facility: CLINIC | Age: 65
End: 2025-01-27
Payer: COMMERCIAL

## 2025-01-27 ENCOUNTER — NON-APPOINTMENT (OUTPATIENT)
Age: 65
End: 2025-01-27

## 2025-01-27 VITALS
DIASTOLIC BLOOD PRESSURE: 90 MMHG | SYSTOLIC BLOOD PRESSURE: 136 MMHG | WEIGHT: 220 LBS | BODY MASS INDEX: 33.34 KG/M2 | HEIGHT: 68 IN | HEART RATE: 97 BPM

## 2025-01-27 DIAGNOSIS — I48.0 PAROXYSMAL ATRIAL FIBRILLATION: ICD-10-CM

## 2025-01-27 DIAGNOSIS — Z95.818 PRESENCE OF OTHER CARDIAC IMPLANTS AND GRAFTS: ICD-10-CM

## 2025-01-27 DIAGNOSIS — I10 ESSENTIAL (PRIMARY) HYPERTENSION: ICD-10-CM

## 2025-01-27 DIAGNOSIS — R00.2 PALPITATIONS: ICD-10-CM

## 2025-01-27 PROCEDURE — 99215 OFFICE O/P EST HI 40 MIN: CPT

## 2025-01-27 PROCEDURE — G2211 COMPLEX E/M VISIT ADD ON: CPT | Mod: NC

## 2025-01-27 PROCEDURE — ZZZZZ: CPT

## 2025-01-27 PROCEDURE — 93000 ELECTROCARDIOGRAM COMPLETE: CPT

## 2025-02-07 PROBLEM — I48.19 PERSISTENT ATRIAL FIBRILLATION: Status: ACTIVE | Noted: 2025-01-27

## 2025-02-08 ENCOUNTER — APPOINTMENT (OUTPATIENT)
Dept: ENDOCRINOLOGY | Facility: CLINIC | Age: 65
End: 2025-02-08
Payer: COMMERCIAL

## 2025-02-08 VITALS — DIASTOLIC BLOOD PRESSURE: 125 MMHG | HEIGHT: 68 IN | SYSTOLIC BLOOD PRESSURE: 164 MMHG | HEART RATE: 78 BPM

## 2025-02-08 DIAGNOSIS — R73.03 PREDIABETES.: ICD-10-CM

## 2025-02-08 DIAGNOSIS — I48.19 OTHER PERSISTENT ATRIAL FIBRILLATION: ICD-10-CM

## 2025-02-08 DIAGNOSIS — E03.9 HYPOTHYROIDISM, UNSPECIFIED: ICD-10-CM

## 2025-02-08 DIAGNOSIS — D35.2 BENIGN NEOPLASM OF PITUITARY GLAND: ICD-10-CM

## 2025-02-08 DIAGNOSIS — E66.9 OBESITY, UNSPECIFIED: ICD-10-CM

## 2025-02-08 DIAGNOSIS — E78.00 PURE HYPERCHOLESTEROLEMIA, UNSPECIFIED: ICD-10-CM

## 2025-02-08 PROCEDURE — 99214 OFFICE O/P EST MOD 30 MIN: CPT

## 2025-02-08 RX ORDER — LEVOTHYROXINE SODIUM 0.14 MG/1
137 TABLET ORAL DAILY
Qty: 180 | Refills: 3 | Status: ACTIVE | COMMUNITY

## 2025-02-15 RX ORDER — TIRZEPATIDE 2.5 MG/.5ML
2.5 INJECTION, SOLUTION SUBCUTANEOUS
Qty: 4 | Refills: 5 | Status: DISCONTINUED | COMMUNITY
Start: 2025-02-08 | End: 2025-02-15

## 2025-02-15 RX ORDER — SEMAGLUTIDE 0.5 MG/.5ML
0.5 INJECTION, SOLUTION SUBCUTANEOUS
Qty: 1 | Refills: 11 | Status: ACTIVE | COMMUNITY
Start: 2025-02-15 | End: 1900-01-01

## 2025-02-27 RX ORDER — SEMAGLUTIDE 0.25 MG/.5ML
0.25 INJECTION, SOLUTION SUBCUTANEOUS
Qty: 1 | Refills: 11 | Status: ACTIVE | COMMUNITY
Start: 1900-01-01 | End: 1900-01-01

## 2025-03-05 ENCOUNTER — APPOINTMENT (OUTPATIENT)
Dept: ORTHOPEDIC SURGERY | Facility: CLINIC | Age: 65
End: 2025-03-05
Payer: COMMERCIAL

## 2025-03-05 DIAGNOSIS — M54.2 CERVICALGIA: ICD-10-CM

## 2025-03-05 DIAGNOSIS — M51.9 UNSPECIFIED THORACIC, THORACOLUMBAR AND LUMBOSACRAL INTERVERTEBRAL DISC DISORDER: ICD-10-CM

## 2025-03-05 DIAGNOSIS — Z98.1 ARTHRODESIS STATUS: ICD-10-CM

## 2025-03-05 DIAGNOSIS — M79.671 PAIN IN RIGHT FOOT: ICD-10-CM

## 2025-03-05 DIAGNOSIS — M54.12 RADICULOPATHY, CERVICAL REGION: ICD-10-CM

## 2025-03-05 DIAGNOSIS — G89.29 PAIN IN RIGHT FOOT: ICD-10-CM

## 2025-03-05 DIAGNOSIS — G89.29 CERVICALGIA: ICD-10-CM

## 2025-03-05 DIAGNOSIS — M17.0 BILATERAL PRIMARY OSTEOARTHRITIS OF KNEE: ICD-10-CM

## 2025-03-05 DIAGNOSIS — E66.9 OBESITY, UNSPECIFIED: ICD-10-CM

## 2025-03-05 PROCEDURE — 73560 X-RAY EXAM OF KNEE 1 OR 2: CPT | Mod: 50

## 2025-03-05 PROCEDURE — 99213 OFFICE O/P EST LOW 20 MIN: CPT

## 2025-03-07 ENCOUNTER — APPOINTMENT (OUTPATIENT)
Dept: CARDIOLOGY | Facility: CLINIC | Age: 65
End: 2025-03-07
Payer: COMMERCIAL

## 2025-03-07 VITALS
SYSTOLIC BLOOD PRESSURE: 116 MMHG | OXYGEN SATURATION: 94 % | DIASTOLIC BLOOD PRESSURE: 74 MMHG | HEART RATE: 84 BPM | HEIGHT: 68 IN | BODY MASS INDEX: 34.1 KG/M2 | WEIGHT: 225 LBS

## 2025-03-07 DIAGNOSIS — E78.00 PURE HYPERCHOLESTEROLEMIA, UNSPECIFIED: ICD-10-CM

## 2025-03-07 DIAGNOSIS — Z95.818 PRESENCE OF OTHER CARDIAC IMPLANTS AND GRAFTS: ICD-10-CM

## 2025-03-07 DIAGNOSIS — R06.09 OTHER FORMS OF DYSPNEA: ICD-10-CM

## 2025-03-07 DIAGNOSIS — R56.9 UNSPECIFIED CONVULSIONS: ICD-10-CM

## 2025-03-07 DIAGNOSIS — R00.2 PALPITATIONS: ICD-10-CM

## 2025-03-07 DIAGNOSIS — I10 ESSENTIAL (PRIMARY) HYPERTENSION: ICD-10-CM

## 2025-03-07 DIAGNOSIS — E03.9 HYPOTHYROIDISM, UNSPECIFIED: ICD-10-CM

## 2025-03-07 DIAGNOSIS — I48.19 OTHER PERSISTENT ATRIAL FIBRILLATION: ICD-10-CM

## 2025-03-07 PROCEDURE — 99213 OFFICE O/P EST LOW 20 MIN: CPT

## 2025-03-10 ENCOUNTER — APPOINTMENT (OUTPATIENT)
Dept: ELECTROPHYSIOLOGY | Facility: CLINIC | Age: 65
End: 2025-03-10

## 2025-03-12 ENCOUNTER — NON-APPOINTMENT (OUTPATIENT)
Age: 65
End: 2025-03-12

## 2025-03-15 ENCOUNTER — RX RENEWAL (OUTPATIENT)
Age: 65
End: 2025-03-15

## 2025-04-10 ENCOUNTER — RESULT REVIEW (OUTPATIENT)
Age: 65
End: 2025-04-10

## 2025-04-10 ENCOUNTER — OUTPATIENT (OUTPATIENT)
Dept: OUTPATIENT SERVICES | Facility: HOSPITAL | Age: 65
LOS: 1 days | End: 2025-04-10
Payer: MEDICARE

## 2025-04-10 VITALS
OXYGEN SATURATION: 100 % | HEIGHT: 68 IN | RESPIRATION RATE: 18 BRPM | DIASTOLIC BLOOD PRESSURE: 86 MMHG | SYSTOLIC BLOOD PRESSURE: 135 MMHG | TEMPERATURE: 98 F | WEIGHT: 244.93 LBS | HEART RATE: 64 BPM

## 2025-04-10 DIAGNOSIS — I48.19 OTHER PERSISTENT ATRIAL FIBRILLATION: ICD-10-CM

## 2025-04-10 DIAGNOSIS — Z98.890 OTHER SPECIFIED POSTPROCEDURAL STATES: Chronic | ICD-10-CM

## 2025-04-10 DIAGNOSIS — Z92.89 PERSONAL HISTORY OF OTHER MEDICAL TREATMENT: Chronic | ICD-10-CM

## 2025-04-10 DIAGNOSIS — Z98.1 ARTHRODESIS STATUS: Chronic | ICD-10-CM

## 2025-04-10 DIAGNOSIS — Z01.818 ENCOUNTER FOR OTHER PREPROCEDURAL EXAMINATION: ICD-10-CM

## 2025-04-10 LAB
ALBUMIN SERPL ELPH-MCNC: 4.4 G/DL — SIGNIFICANT CHANGE UP (ref 3.5–5.2)
ALP SERPL-CCNC: 83 U/L — SIGNIFICANT CHANGE UP (ref 30–115)
ALT FLD-CCNC: 12 U/L — SIGNIFICANT CHANGE UP (ref 0–41)
ANION GAP SERPL CALC-SCNC: 11 MMOL/L — SIGNIFICANT CHANGE UP (ref 7–14)
APTT BLD: 27.9 SEC — SIGNIFICANT CHANGE UP (ref 27–39.2)
AST SERPL-CCNC: 19 U/L — SIGNIFICANT CHANGE UP (ref 0–41)
BASOPHILS # BLD AUTO: 0.07 K/UL — SIGNIFICANT CHANGE UP (ref 0–0.2)
BASOPHILS NFR BLD AUTO: 1.2 % — HIGH (ref 0–1)
BILIRUB SERPL-MCNC: 0.5 MG/DL — SIGNIFICANT CHANGE UP (ref 0.2–1.2)
BLD GP AB SCN SERPL QL: SIGNIFICANT CHANGE UP
BUN SERPL-MCNC: 17 MG/DL — SIGNIFICANT CHANGE UP (ref 10–20)
CALCIUM SERPL-MCNC: 9.5 MG/DL — SIGNIFICANT CHANGE UP (ref 8.4–10.5)
CHLORIDE SERPL-SCNC: 100 MMOL/L — SIGNIFICANT CHANGE UP (ref 98–110)
CO2 SERPL-SCNC: 31 MMOL/L — SIGNIFICANT CHANGE UP (ref 17–32)
CREAT SERPL-MCNC: 1 MG/DL — SIGNIFICANT CHANGE UP (ref 0.7–1.5)
EGFR: 63 ML/MIN/1.73M2 — SIGNIFICANT CHANGE UP
EGFR: 63 ML/MIN/1.73M2 — SIGNIFICANT CHANGE UP
EOSINOPHIL # BLD AUTO: 0.14 K/UL — SIGNIFICANT CHANGE UP (ref 0–0.7)
EOSINOPHIL NFR BLD AUTO: 2.4 % — SIGNIFICANT CHANGE UP (ref 0–8)
GLUCOSE SERPL-MCNC: 91 MG/DL — SIGNIFICANT CHANGE UP (ref 70–99)
HCT VFR BLD CALC: 44.1 % — SIGNIFICANT CHANGE UP (ref 37–47)
HGB BLD-MCNC: 14.6 G/DL — SIGNIFICANT CHANGE UP (ref 12–16)
IMM GRANULOCYTES NFR BLD AUTO: 0.2 % — SIGNIFICANT CHANGE UP (ref 0.1–0.3)
INR BLD: 0.87 RATIO — SIGNIFICANT CHANGE UP (ref 0.65–1.3)
LYMPHOCYTES # BLD AUTO: 1.78 K/UL — SIGNIFICANT CHANGE UP (ref 1.2–3.4)
LYMPHOCYTES # BLD AUTO: 30.2 % — SIGNIFICANT CHANGE UP (ref 20.5–51.1)
MCHC RBC-ENTMCNC: 30.8 PG — SIGNIFICANT CHANGE UP (ref 27–31)
MCHC RBC-ENTMCNC: 33.1 G/DL — SIGNIFICANT CHANGE UP (ref 32–37)
MCV RBC AUTO: 93 FL — SIGNIFICANT CHANGE UP (ref 81–99)
MONOCYTES # BLD AUTO: 0.59 K/UL — SIGNIFICANT CHANGE UP (ref 0.1–0.6)
MONOCYTES NFR BLD AUTO: 10 % — HIGH (ref 1.7–9.3)
NEUTROPHILS # BLD AUTO: 3.31 K/UL — SIGNIFICANT CHANGE UP (ref 1.4–6.5)
NEUTROPHILS NFR BLD AUTO: 56 % — SIGNIFICANT CHANGE UP (ref 42.2–75.2)
NRBC BLD AUTO-RTO: 0 /100 WBCS — SIGNIFICANT CHANGE UP (ref 0–0)
PLATELET # BLD AUTO: 209 K/UL — SIGNIFICANT CHANGE UP (ref 130–400)
PMV BLD: 12.1 FL — HIGH (ref 7.4–10.4)
POTASSIUM SERPL-MCNC: 4.3 MMOL/L — SIGNIFICANT CHANGE UP (ref 3.5–5)
POTASSIUM SERPL-SCNC: 4.3 MMOL/L — SIGNIFICANT CHANGE UP (ref 3.5–5)
PROT SERPL-MCNC: 7.1 G/DL — SIGNIFICANT CHANGE UP (ref 6–8)
PROTHROM AB SERPL-ACNC: 10.2 SEC — SIGNIFICANT CHANGE UP (ref 9.95–12.87)
RBC # BLD: 4.74 M/UL — SIGNIFICANT CHANGE UP (ref 4.2–5.4)
RBC # FLD: 13.7 % — SIGNIFICANT CHANGE UP (ref 11.5–14.5)
SODIUM SERPL-SCNC: 142 MMOL/L — SIGNIFICANT CHANGE UP (ref 135–146)
WBC # BLD: 5.9 K/UL — SIGNIFICANT CHANGE UP (ref 4.8–10.8)
WBC # FLD AUTO: 5.9 K/UL — SIGNIFICANT CHANGE UP (ref 4.8–10.8)

## 2025-04-10 PROCEDURE — 85730 THROMBOPLASTIN TIME PARTIAL: CPT

## 2025-04-10 PROCEDURE — 85025 COMPLETE CBC W/AUTO DIFF WBC: CPT

## 2025-04-10 PROCEDURE — 71046 X-RAY EXAM CHEST 2 VIEWS: CPT

## 2025-04-10 PROCEDURE — 71046 X-RAY EXAM CHEST 2 VIEWS: CPT | Mod: 26

## 2025-04-10 PROCEDURE — 86850 RBC ANTIBODY SCREEN: CPT

## 2025-04-10 PROCEDURE — 93005 ELECTROCARDIOGRAM TRACING: CPT

## 2025-04-10 PROCEDURE — 86900 BLOOD TYPING SEROLOGIC ABO: CPT

## 2025-04-10 PROCEDURE — 80053 COMPREHEN METABOLIC PANEL: CPT

## 2025-04-10 PROCEDURE — 36415 COLL VENOUS BLD VENIPUNCTURE: CPT

## 2025-04-10 PROCEDURE — 93010 ELECTROCARDIOGRAM REPORT: CPT

## 2025-04-10 PROCEDURE — 85610 PROTHROMBIN TIME: CPT

## 2025-04-10 PROCEDURE — 86901 BLOOD TYPING SEROLOGIC RH(D): CPT

## 2025-04-10 PROCEDURE — 99214 OFFICE O/P EST MOD 30 MIN: CPT | Mod: 25

## 2025-04-10 NOTE — H&P PST ADULT - BMI (KG/M2)
I spoke with SHARAD Rizvi. She recommends patient to put his nose to rest again with the vaseline soaked cotton ball. I spoke to patient. He states it is the same nostril that was cauterized. Patient was instructed to put the nose to rest for 4-5 days again. He was reminded to use a humidifier/vaporizer while sleeping, use saline nasal spray a few times a day after the nose to rest, and only use afrin if he has recurrent bleeding. Patient will call with questions or concerns.    37.2

## 2025-04-10 NOTE — H&P PST ADULT - HISTORY OF PRESENT ILLNESS
Patient is a 64 year old female presenting to [facility] in preparation for AF ABLATION JACQUI PFA  on 4/24 under gen anesthesia by Dr. Bauman  (hx HTN HLD Brain Tumor.hypothyroid. pt was eval'd by pmd and  found in afib. . per cardio note pt has  seizures at night. however no recent  seizure since 1//25. also with history of TIA . 1/25.  PATIENT CURRENTLY DENIES CHEST PAIN  SHORTNESS OF BREATH  PALPITATIONS,  DYSURIA, OR UPPER RESPIRATORY INFECTION IN PAST 2 WEEKS    Anesthesia Alert  NO--Difficult Airway  NO--History of neck surgery or radiation  NO--Limited ROM of neck  NO--History of Malignant hyperthermia  NO--Personal or family history of Pseudocholinesterase deficiency  NO--Prior Anesthesia Complication  NO--Latex Allergy  NO--Loose teeth  NO--History of Rheumatoid Arthritis  NO--KRISTINA  NO-- BLEEDING RISK  NO--Other_____      As per patient, this is their complete medical and surgical history, including medications both prescribed or over the counter.  Patient verbalized understanding of instructions and was given the opportunity to ask questions and have them answered.   Patient is a 64 year old female presenting to [facility] in preparation for AF ABLATION JACQUI PFA  on 4/24 under gen anesthesia by Dr. Bauman. Reports h/o a-fib aprox 2 years ago , had a cardiovesion 2x's and the watchman procedure, has been advised to have above  (hx HTN HLD Brain Tumor.hypothyroid. pt was eval'd by pmd and  found in afib. . per cardio note pt has  seizures at night. however no recent  seizure since 2//25. also with history of TIA . 3/25.  PATIENT CURRENTLY DENIES CHEST PAIN  SHORTNESS OF BREATH  PALPITATIONS,  DYSURIA, OR UPPER RESPIRATORY INFECTION IN PAST 2 WEEKS    Anesthesia Alert  NO--Difficult Airway  NO--History of neck surgery or radiation  NO--Limited ROM of neck  NO--History of Malignant hyperthermia  NO--Personal or family history of Pseudocholinesterase deficiency  NO--Prior Anesthesia Complication  NO--Latex Allergy  NO--Loose teeth  NO--History of Rheumatoid Arthritis  NO--KRISTIAN  NO-- BLEEDING RISK  NO--Other_____        D[employer] Activity Status Index (DASI) from [employer]  on 4/10/2025      RESULT SUMMARY:  46.2 points  The higher the score (maximum 58.2), the higher the functional status.    8.42 METs        INPUTS:  Take care of self —> 2.75 = Yes  Walk indoors —> 1.75 = Yes  Walk 1&ndash;2 blocks on level ground —> 2.75 = Yes  Climb a flight of stairs or walk up a hill —> 5.5 = Yes  Run a short distance —> 8 = Yes  Do light work around the house —> 2.7 = Yes  Do moderate work around the house —> 3.5 = Yes  Do heavy work around the house —> 8 = Yes  Do yardwork —> 0 = No  Have sexual relations —> 5.25 = Yes  Participate in moderate recreational activities —> 6 = Yes  Participate in strenuous sports —> 0 = No          Revised Cardiac Risk Index for Pre-Operative Risk from [employer]  on 4/10/2025      RESULT SUMMARY:  1 points  RCRI Score    6.0 %  Risk of major cardiac event      INPUTS:  High-risk surgery —> 0 = No  History of ischemic heart disease —> 0 = No  History of congestive heart failure —> 0 = No  History of cerebrovascular disease —> 1 = Yes  Pre-operative treatment with insulin —> 0 = No  Pre-operative creatinine >2 mg/dL / 176.8 µmol/L —> 0 = No          As per patient, this is their complete medical and surgical history, including medications both prescribed or over the counter.  Patient verbalized understanding of instructions and was given the opportunity to ask questions and have them answered.

## 2025-04-10 NOTE — H&P PST ADULT - CARDIOVASCULAR
normal/regular rate and rhythm/S1 S2 present/no gallops/no rub/no murmur normal/no murmur/irregular rate and rhythm

## 2025-04-11 DIAGNOSIS — Z01.818 ENCOUNTER FOR OTHER PREPROCEDURAL EXAMINATION: ICD-10-CM

## 2025-04-11 DIAGNOSIS — I48.19 OTHER PERSISTENT ATRIAL FIBRILLATION: ICD-10-CM

## 2025-04-21 ENCOUNTER — APPOINTMENT (OUTPATIENT)
Dept: PAIN MANAGEMENT | Facility: CLINIC | Age: 65
End: 2025-04-21
Payer: COMMERCIAL

## 2025-04-21 DIAGNOSIS — M54.16 RADICULOPATHY, LUMBAR REGION: ICD-10-CM

## 2025-04-21 PROCEDURE — 64484 NJX AA&/STRD TFRM EPI L/S EA: CPT | Mod: RT

## 2025-04-21 PROCEDURE — 64483 NJX AA&/STRD TFRM EPI L/S 1: CPT | Mod: RT

## 2025-04-24 ENCOUNTER — APPOINTMENT (OUTPATIENT)
Dept: ELECTROPHYSIOLOGY | Facility: HOSPITAL | Age: 65
End: 2025-04-24

## 2025-04-24 ENCOUNTER — INPATIENT (INPATIENT)
Facility: HOSPITAL | Age: 65
LOS: 0 days | Discharge: HOME | DRG: 274 | End: 2025-04-25
Attending: STUDENT IN AN ORGANIZED HEALTH CARE EDUCATION/TRAINING PROGRAM | Admitting: STUDENT IN AN ORGANIZED HEALTH CARE EDUCATION/TRAINING PROGRAM
Payer: MEDICARE

## 2025-04-24 VITALS
RESPIRATION RATE: 17 BRPM | SYSTOLIC BLOOD PRESSURE: 179 MMHG | TEMPERATURE: 98 F | DIASTOLIC BLOOD PRESSURE: 97 MMHG | HEART RATE: 94 BPM | OXYGEN SATURATION: 98 % | WEIGHT: 225.31 LBS | HEIGHT: 68 IN

## 2025-04-24 DIAGNOSIS — Z98.890 OTHER SPECIFIED POSTPROCEDURAL STATES: Chronic | ICD-10-CM

## 2025-04-24 DIAGNOSIS — I48.91 UNSPECIFIED ATRIAL FIBRILLATION: ICD-10-CM

## 2025-04-24 DIAGNOSIS — I48.19 OTHER PERSISTENT ATRIAL FIBRILLATION: ICD-10-CM

## 2025-04-24 DIAGNOSIS — Z98.1 ARTHRODESIS STATUS: Chronic | ICD-10-CM

## 2025-04-24 DIAGNOSIS — Z92.89 PERSONAL HISTORY OF OTHER MEDICAL TREATMENT: Chronic | ICD-10-CM

## 2025-04-24 LAB — BLD GP AB SCN SERPL QL: SIGNIFICANT CHANGE UP

## 2025-04-24 PROCEDURE — 93657 TX L/R ATRIAL FIB ADDL: CPT

## 2025-04-24 PROCEDURE — 86900 BLOOD TYPING SEROLOGIC ABO: CPT

## 2025-04-24 PROCEDURE — 93656 COMPRE EP EVAL ABLTJ ATR FIB: CPT

## 2025-04-24 PROCEDURE — 86850 RBC ANTIBODY SCREEN: CPT

## 2025-04-24 PROCEDURE — 93005 ELECTROCARDIOGRAM TRACING: CPT

## 2025-04-24 PROCEDURE — 36415 COLL VENOUS BLD VENIPUNCTURE: CPT

## 2025-04-24 PROCEDURE — C1769: CPT

## 2025-04-24 PROCEDURE — 80048 BASIC METABOLIC PNL TOTAL CA: CPT

## 2025-04-24 PROCEDURE — 93318 ECHO TRANSESOPHAGEAL INTRAOP: CPT

## 2025-04-24 PROCEDURE — C1766: CPT

## 2025-04-24 PROCEDURE — 93320 DOPPLER ECHO COMPLETE: CPT | Mod: 26

## 2025-04-24 PROCEDURE — 76937 US GUIDE VASCULAR ACCESS: CPT | Mod: 26

## 2025-04-24 PROCEDURE — C1730: CPT

## 2025-04-24 PROCEDURE — C1894: CPT

## 2025-04-24 PROCEDURE — C1760: CPT

## 2025-04-24 PROCEDURE — C1732: CPT

## 2025-04-24 PROCEDURE — 93325 DOPPLER ECHO COLOR FLOW MAPG: CPT | Mod: 26

## 2025-04-24 PROCEDURE — C1733: CPT

## 2025-04-24 PROCEDURE — C1759: CPT

## 2025-04-24 PROCEDURE — C1893: CPT

## 2025-04-24 PROCEDURE — 93312 ECHO TRANSESOPHAGEAL: CPT | Mod: 26

## 2025-04-24 PROCEDURE — 93312 ECHO TRANSESOPHAGEAL: CPT

## 2025-04-24 PROCEDURE — 86901 BLOOD TYPING SEROLOGIC RH(D): CPT

## 2025-04-24 RX ORDER — APIXABAN 2.5 MG/1
5 TABLET, FILM COATED ORAL EVERY 12 HOURS
Refills: 0 | Status: DISCONTINUED | OUTPATIENT
Start: 2025-04-24 | End: 2025-04-25

## 2025-04-24 RX ORDER — APIXABAN 2.5 MG/1
5 TABLET, FILM COATED ORAL EVERY 12 HOURS
Refills: 0 | Status: DISCONTINUED | OUTPATIENT
Start: 2025-04-24 | End: 2025-04-24

## 2025-04-24 RX ORDER — LISINOPRIL 5 MG/1
40 TABLET ORAL DAILY
Refills: 0 | Status: DISCONTINUED | OUTPATIENT
Start: 2025-04-24 | End: 2025-04-25

## 2025-04-24 RX ORDER — LEVOTHYROXINE SODIUM 300 MCG
300 TABLET ORAL DAILY
Refills: 0 | Status: DISCONTINUED | OUTPATIENT
Start: 2025-04-24 | End: 2025-04-25

## 2025-04-24 RX ORDER — APIXABAN 2.5 MG/1
5 TABLET, FILM COATED ORAL ONCE
Refills: 0 | Status: COMPLETED | OUTPATIENT
Start: 2025-04-24 | End: 2025-04-24

## 2025-04-24 RX ORDER — APIXABAN 2.5 MG/1
TABLET, FILM COATED ORAL
Refills: 0 | Status: DISCONTINUED | OUTPATIENT
Start: 2025-04-24 | End: 2025-04-24

## 2025-04-24 RX ORDER — ASPIRIN 325 MG
325 TABLET ORAL DAILY
Refills: 0 | Status: DISCONTINUED | OUTPATIENT
Start: 2025-04-24 | End: 2025-04-24

## 2025-04-24 RX ORDER — ATORVASTATIN CALCIUM 80 MG/1
20 TABLET, FILM COATED ORAL AT BEDTIME
Refills: 0 | Status: DISCONTINUED | OUTPATIENT
Start: 2025-04-24 | End: 2025-04-25

## 2025-04-24 RX ORDER — LIDOCAINE HCL/EPINEPHRINE/PF 1 %-1:200K
10 AMPUL (ML) INJECTION ONCE
Refills: 0 | Status: DISCONTINUED | OUTPATIENT
Start: 2025-04-24 | End: 2025-04-25

## 2025-04-24 RX ORDER — ACETAMINOPHEN 500 MG/5ML
650 LIQUID (ML) ORAL ONCE
Refills: 0 | Status: COMPLETED | OUTPATIENT
Start: 2025-04-24 | End: 2025-04-24

## 2025-04-24 RX ORDER — ACETAMINOPHEN 500 MG/5ML
650 LIQUID (ML) ORAL EVERY 6 HOURS
Refills: 0 | Status: DISCONTINUED | OUTPATIENT
Start: 2025-04-24 | End: 2025-04-25

## 2025-04-24 RX ORDER — SEMAGLUTIDE 1 MG/.5ML
0.5 INJECTION, SOLUTION SUBCUTANEOUS
Refills: 0 | DISCHARGE

## 2025-04-24 RX ORDER — METOPROLOL SUCCINATE 50 MG/1
100 TABLET, EXTENDED RELEASE ORAL DAILY
Refills: 0 | Status: DISCONTINUED | OUTPATIENT
Start: 2025-04-24 | End: 2025-04-25

## 2025-04-24 RX ORDER — MELATONIN 5 MG
5 TABLET ORAL AT BEDTIME
Refills: 0 | Status: DISCONTINUED | OUTPATIENT
Start: 2025-04-24 | End: 2025-04-25

## 2025-04-24 RX ORDER — LEVETIRACETAM 10 MG/ML
1500 INJECTION, SOLUTION INTRAVENOUS
Refills: 0 | Status: DISCONTINUED | OUTPATIENT
Start: 2025-04-24 | End: 2025-04-25

## 2025-04-24 RX ADMIN — Medication 20 MILLIGRAM(S): at 11:56

## 2025-04-24 RX ADMIN — ATORVASTATIN CALCIUM 20 MILLIGRAM(S): 80 TABLET, FILM COATED ORAL at 21:11

## 2025-04-24 RX ADMIN — Medication 5 MILLIGRAM(S): at 21:36

## 2025-04-24 RX ADMIN — APIXABAN 5 MILLIGRAM(S): 2.5 TABLET, FILM COATED ORAL at 21:11

## 2025-04-24 RX ADMIN — LEVETIRACETAM 1500 MILLIGRAM(S): 10 INJECTION, SOLUTION INTRAVENOUS at 17:39

## 2025-04-24 RX ADMIN — Medication 650 MILLIGRAM(S): at 12:07

## 2025-04-24 RX ADMIN — APIXABAN 5 MILLIGRAM(S): 2.5 TABLET, FILM COATED ORAL at 11:57

## 2025-04-24 NOTE — DISCHARGE NOTE PROVIDER - HOSPITAL COURSE
63 year old female with PMHx of HTN, HLD, Paroxysmal AF, Cerebral AVM in the past s/p Craniotomy with 7 prior TIAs, s/p  amulet placement  4/23/25 on  mg daily comes in for elective atrial fibrillation ablation.  On  4/24/25,  patient underwent successful PFA ablation for atrial fibrillation. Patient was monitored overnight. On POD 1 patient remains HD stable with no complaints. Patient remains in NSR with no arrhythmias noted on tele. Examination of b/l common femoral venous access sites reveal a Clear and dry area with no hematoma or erythema. For PVI- Patient should continue Eliquis 5 mg q12 hrs. Patient is being DC home in stable condition. 63 year old female with PMHx of HTN, HLD, Paroxysmal AF, Cerebral AVM in the past s/p Craniotomy with 7 prior TIAs, s/p  amulet placement  4/23/25 on  mg daily comes in for elective atrial fibrillation ablation.  On  4/24/25,  patient underwent successful PFA ablation for atrial fibrillation. Patient was monitored overnight. On POD 1 patient remains HD stable with no complaints. Patient remains in NSR with no arrhythmias noted on tele. Examination of b/l common femoral venous access sites reveal a Clear and dry area with no hematoma or erythema. For PVI- Patient should continue Eliquis 5 mg q12 hrs.  d/w , can dc aspirin 325 mg  while on Eliquis . Patient is being DC home in stable condition.   63 year old female with PMHx of HTN, HLD, Paroxysmal AF, Cerebral AVM in the past s/p Craniotomy with 7 prior TIAs, s/p  amulet placement  4/23/25 on  mg daily comes in for elective atrial fibrillation ablation.  On  4/24/25,  patient underwent successful PFA ablation for atrial fibrillation. Patient was monitored overnight. On POD 1 patient remains HD stable with no complaints. Patient remains in NSR with no arrhythmias noted on tele. Examination of b/l common femoral venous access sites reveal a Clear and dry area with no hematoma or erythema. For PVI- Patient should continue Eliquis 5 mg q12 hrs.  d/w , can dc aspirin 325 mg  while on Eliquis.  Pres[facility] of [facility]  [facility] unable to fill the prescription due to the patient's current active 90 day supply of eliquis 5mg BID.  [facility] states next refill of eliquis that was sent this admission can be activated on 5/18/25.  Patient informed to  new prescription on 5/18.  Provider confirmed with patient that patient has 90 day supply of eliquis at home.  Patient is being DC home in stable condition.

## 2025-04-24 NOTE — DISCHARGE NOTE PROVIDER - NSDCMRMEDTOKEN_GEN_ALL_CORE_FT
aspirin 325 mg oral tablet: 1 tab(s) orally once a day  atorvastatin 10 mg oral tablet: 2 tab(s) orally once a day (at bedtime)  cholecalciferol oral tablet: 5000 unit(s) orally once a day  famotidine 40 mg oral tablet: 1 tab(s) orally once a day  levETIRAcetam 750 mg oral tablet: 2 tab(s) orally 2 times a day  levothyroxine 300 mcg (0.3 mg) oral tablet: 1 tab(s) orally once a day  metoprolol succinate 100 mg oral capsule, extended release: 1 cap(s) orally once a day  omeprazole 40 mg oral delayed release capsule: 1 cap(s) orally once a day  pantoprazole 40 mg oral delayed release tablet: 1 tab(s) orally once a day (before a meal)  Percocet 10 mg-325 mg oral tablet: 1 tab(s) orally once a day  Premarin 0.625 mg oral tablet: 1 tab(s) orally once a day  ramipril 10 mg oral capsule: 1 cap(s) orally once a day  Wegovy (0.5 mg dose) subcutaneous solution: 0.5 milligram(s) subcutaneously once a week   atorvastatin 10 mg oral tablet: 2 tab(s) orally once a day (at bedtime)  cholecalciferol oral tablet: 5000 unit(s) orally once a day  famotidine 40 mg oral tablet: 1 tab(s) orally once a day  levETIRAcetam 750 mg oral tablet: 2 tab(s) orally 2 times a day  levothyroxine 300 mcg (0.3 mg) oral tablet: 1 tab(s) orally once a day  metoprolol succinate 100 mg oral capsule, extended release: 1 cap(s) orally once a day  omeprazole 40 mg oral delayed release capsule: 1 cap(s) orally once a day  pantoprazole 40 mg oral delayed release tablet: 1 tab(s) orally once a day (before a meal)  Percocet 10 mg-325 mg oral tablet: 1 tab(s) orally once a day  Premarin 0.625 mg oral tablet: 1 tab(s) orally once a day  ramipril 10 mg oral capsule: 1 cap(s) orally once a day  Wegovy (0.5 mg dose) subcutaneous solution: 0.5 milligram(s) subcutaneously once a week   apixaban 5 mg oral tablet: 1 tab(s) orally every 12 hours  atorvastatin 10 mg oral tablet: 2 tab(s) orally once a day (at bedtime)  cholecalciferol oral tablet: 5000 unit(s) orally once a day  famotidine 40 mg oral tablet: 1 tab(s) orally once a day  levETIRAcetam 750 mg oral tablet: 2 tab(s) orally 2 times a day  levothyroxine 300 mcg (0.3 mg) oral tablet: 1 tab(s) orally once a day  metoprolol succinate 100 mg oral capsule, extended release: 1 cap(s) orally once a day  omeprazole 40 mg oral delayed release capsule: 1 cap(s) orally once a day  pantoprazole 40 mg oral delayed release tablet: 1 tab(s) orally once a day (before a meal)  Percocet 10 mg-325 mg oral tablet: 1 tab(s) orally once a day  Premarin 0.625 mg oral tablet: 1 tab(s) orally once a day  ramipril 10 mg oral capsule: 1 cap(s) orally once a day  Wegovy (0.5 mg dose) subcutaneous solution: 0.5 milligram(s) subcutaneously once a week

## 2025-04-24 NOTE — DISCHARGE NOTE PROVIDER - CARE PROVIDER_API CALL
Jazlyn Bauman  Cardiovascular Disease  [address] [place] [id number]  Phone: [phone]  Fax: [phone]  Follow Up Time: 2 weeks

## 2025-04-24 NOTE — ASU PREOP CHECKLIST - SURGICAL CONSENT
Patient has heart rate of 190 which started about 2 hours ago. We did an ablation of SVT about 3 years ago. I asked him to go to ER to try cardioversion.   I will follow him in office afterwards
pending

## 2025-04-24 NOTE — PROGRESS NOTE ADULT - SUBJECTIVE AND OBJECTIVE BOX
Electrophysiology Brief Post-Op Note      I have personally seen and examined the patient.  I agree with the history and physical which I have reviewed and noted any changes below.  04-24-25 @ 08:12    PRE-OP DIAGNOSIS: Persistent AFib    POST-OP DIAGNOSIS: Persistent AFib    PROCEDURE:   - JACQUI   - AFib Ablation (PFA AFib - PVI, PWI) - 40 applications     VASCULAR ACCESS (with ultrasound guidance)   - Right femoral vein: 16.8 Fr   - Left femoral vein: 8 Fr, 11 Fr   - Right femoral artery: none    Physician: Jazlyn Bauman MD  Assistant: None    ANESTHESIA TYPE:  [ X ] General Anesthesia  [  ] Sedation  [ X ] Local/Regional    ESTIMATED BLOOD LOSS:     20  mL    CONDITION  [  ] Critical  [  ] Serious  [  ]Fair  [ X ]Good    SPECIMENS REMOVED (IF APPLICABLE): N/A    IMPLANTS (IF APPLICABLE): N/A    FINDINGS: Persistent AFib    COMPLICATIONS: None    PLAN OF CARE  -           Admit to telemetry  -	Start Eliquis 5 mg q12 hours, first dose now, second dose at 10 pm   -	Bedrest 2 hours  -           Check BMP in AM  -           Follow up with me in the office in 2-3 weeks

## 2025-04-24 NOTE — ASU PATIENT PROFILE, ADULT - PAIN, FACTORS THAT RELIEVE, PROFILE
DATE OF PROCEDURE:  05/22/2017    PREOPERATIVE DIAGNOSES:  Acute limb ischemia, thrombosed bypass graft.    POSTOPERATIVE DIAGNOSES:  Acute limb ischemia, thrombosed bypass graft.    ATTENDING SURGEON:  Charanjit Mclean M.D.    ANESTHESIA:  Nothing.  The patient was already intubated and sedated.    PROCEDURES:  Angiogram right lower extremity with angioplasty, third order   tibial balloon angioplasty and thrombolysis check.    INDICATIONS:  This is a 55-year-old male who has a history of peripheral   vascular disease.  He has had previously performed right fem-distal bypass, and   he presents at [facility] with acute thrombosis of his bypass graft   and has been undergoing catheter-directed TPA thrombolysis therapy.  He presents   today for return and evaluation of his thrombolysis treatment and repeat   angiogram.    PROCEDURE IN DETAIL:  After discussion with the patient and the patient's family   regarding risks, benefits, potential complications, they agreed and signed   informed consent.  He was brought to the specialist procedure room and placed in   supine position, and after adequate monitoring, we prepped and draped the   patient's bilateral groins in the standard surgical fashion including the   previously placed introducer sheath and infusion catheter.  At this point, I   removed the infusion catheter over a 0.035 Glidewire.  I shot a right lower   extremity angiogram.    RADIOLOGIC FINDINGS:  The patient had a widely patent common femoral, deep   femoral artery.  The previously placed bypass graft was widely patent.  There   was a moderate focal stricture in the groin near the arterial inflow   anastomosis.  Distally, the outflow anastomosis appeared to be widely patent and   there was continuous flow into the tibioperoneal trunk.  However, there did   appear to be residual stenosis in the origin of the anterior tibial artery and   peroneal vessels.  Distally, we heparinized the patient.  I  advanced a 0.035   Glidewire and then downsized to 0.014 wire, PT ChoICE wire and advanced and   selectively cannulated in a third order fashion, the right anterior tibial   artery.  I then performed balloon angioplasty, tibial angioplasty using a 3 x 80   mm balloon.  Subsequent images revealed no residual stenosis at the origin of   the anterior tibial artery with flow down to the tibial vessels into the foot.    I then performed again balloon angioplasty of the proximal inflow anastomotic   stricture in the bypass graft.  I used a 6 x 40 mm balloon.  Subsequent images   of this area also revealed only mild residual stenosis.  At this point, no   further interventions were undertaken.  All catheters and wires were removed   from the groin.  Direct manual pressure was held for approximately 30 minutes.    The patient tolerated the procedure and transferred to Recovery Room with stable   vital signs.      VT/  dd: 05/25/2017 07:50:39 (CDT)  td: 05/25/2017 10:46:43 (CDT)  Doc ID   #[id number]  Job ID #[id number]    CC:    medications

## 2025-04-24 NOTE — PATIENT PROFILE ADULT - CONTRAINDICATIONS & PRECAUTIONS (SELECT ALL THAT APPLY)
Chief Complaint   Patient presents with   • Skin Assessment     noticed a mole on the back around July and since, it has changed in color. does not think it has changed in shape or size.          SUBJECTIVE:  Patient is 28 year old female who presents for:  Consult referral from Dr. Cordon for lesion on back noted since July and changing in color. PT states lesion is asymptomatic.  Patient denies any other skin problems.  Denies easy bruising or bleeding.        REVIEW OF SYSTEMS:  Pertinent positives: skin lesion  Pertinent negatives:  No fever, no chills.    PAST, FAMILY, SOCIAL HISTORY:  As below    Family History   Problem Relation Age of Onset   • Other Mother         varicose veins   • Non-Hodgkin's Lymphoma Father 40   • Osteoarthritis Father    • Heart disease Father    • Diabetes Maternal Grandfather         type 2          Social History     Tobacco Use   • Smoking status: Never Smoker   • Smokeless tobacco: Never Used   Substance Use Topics   • Alcohol use: Not Currently   • Drug use: Never       Past Medical History:   Diagnosis Date   • Depressive disorder        ALLERGIES:  No Known Allergies    Current Outpatient Medications   Medication Sig Dispense Refill   • Prenatal Vit-Fe Fumarate-FA (PRENATAL 19 PO)      • Misc. Devices (Breast Pump) Misc Double electric breast pump  Z39.1--lactating mother  EDC 11/30/2021 1 each 0   • Docosahexaenoic Acid (DHA COMPLETE PO) Take 1 tablet by mouth.     • cholecalciferol (VITAMIN D3) 1000 UNITS tablet Take 1,000 Units by mouth daily.       No current facility-administered medications for this visit.       Visit Vitals  /69 (BP Location: RUE - Right upper extremity, Patient Position: Sitting)   Ht 5' 5\" (1.651 m)   Wt 74.4 kg   LMP 02/23/2021   BMI 27.29 kg/m²           OBJECTIVE:   General Appearance:  Patient is AO (alert and oriented) x3, WDWN (well developed, well nourished) in NAD (no acute distress).  Patient is well groomed.  Mood/Affect:    Stable/Normal.  Total skin exam -- Thorough evaluation of head (including face and scalp), eyelids, lips, neck,  chest, back, abdomen, upper extremities, lower extremities, buttocks notable for:     Chest: Tan, brown macules and papules, uniformly pigmented and symmetrical.  Back:  Stuck-on hyperkeratotic, tan waxy papules., few Tan, brown macules and papules, uniformly pigmented and symmetrical.  Right Upper Extremities:   red keratotic follicular based papules  Left Upper Extremities: red keratotic follicular based papules         ASSESSMENT AND PLAN:       Seborrheic Keratosis -mid back  Stuck-on hyperkeratotic, tan waxy papules.  Discussed with patient lesion benign, no concern for malignancy today.  Call if change.    KP (keratosis pilaris)  Treatment plan  AM Apply CeraVe SA (salicylic acid) Cream, which is over the counter) 1-2 times per day to all affected areas.  (Please be sure to get the \"SA cream\" since regular CeraVe does not contain salicylic acid and may not help KP.)      Use only fragrance-free products, including:  - Fragrance-free so ap/ cleanser such as Dove sensitive skin.  - Fragrance-free detergent and dryer sheets such as All Free & Clear or Tide FREE; Bounce FREE.  AVOID scrubbing, as this can make keratosis pilaris more red and inflamed and does not actually help the bumps.    Call sooner if any problems or concerns.     Jina Cassidy MD, PhD  9/17/2021, 10:57 AM    CC copy of today's encounter to physician requesting consult, Dr. Cordon   Patient/surrogate refused vaccine...

## 2025-04-24 NOTE — DISCHARGE NOTE PROVIDER - NSDCCPTREATMENT_GEN_ALL_CORE_FT
PRINCIPAL PROCEDURE  Procedure: Ablation of arrhythmogenic focus for atrial fibrillation in cardiac catheterization laboratory  Findings and Treatment: - Cont Eliquis uninterupted for 3 months postop  - Do NOT stop blood thinners unless discussed with doctor  - Cont Protonix 40 mg daily x 30 days  - No heavy lifting > 10 lbs, squatting, or exertional activities 1-2 weeks  - Can take a shower starting today  - No submerging in water for 1 week  - No driving for 5 days  - Fu in office in 2-3 weeks with         PRINCIPAL PROCEDURE  Procedure: Ablation of arrhythmogenic focus for atrial fibrillation in cardiac catheterization laboratory  Findings and Treatment: - Cont Eliquis uninterupted for 3 months postop  - Stop Aspirin 325 while on Eliquis   - Do NOT stop blood thinners unless discussed with doctor  - Cont Protonix 40 mg daily x 30 days  - No heavy lifting > 10 lbs, squatting, or exertional activities 1-2 weeks  - Can take a shower starting today  - No submerging in water for 1 week  - No driving for 5 days  - Fu in office in 2-3 weeks with

## 2025-04-24 NOTE — DISCHARGE NOTE PROVIDER - NSDCFUSCHEDAPPT_GEN_ALL_CORE_FT
Kendall Sunshine  [facility]  ONCPAINMGT [address]  Scheduled Appointment: 05/07/2025    Noah Zhang  [facility]  ONCORTHO [address]  Scheduled Appointment: 05/09/2025    Jazlyn Bauman  [facility]  ELECTROPH [address]  Scheduled Appointment: 05/21/2025    Beau Orozco  [facility]  ONCORTHO [address]  Scheduled Appointment: 06/05/2025    Rock Rudolph  [facility]  CARDIOLOGY [address]  Scheduled Appointment: 07/11/2025

## 2025-04-24 NOTE — PATIENT PROFILE ADULT - FALL HARM RISK - HARM RISK INTERVENTIONS

## 2025-04-24 NOTE — ASU PATIENT PROFILE, ADULT - FALL HARM RISK - HARM RISK INTERVENTIONS

## 2025-04-25 ENCOUNTER — TRANSCRIPTION ENCOUNTER (OUTPATIENT)
Age: 65
End: 2025-04-25

## 2025-04-25 VITALS
RESPIRATION RATE: 20 BRPM | TEMPERATURE: 97 F | DIASTOLIC BLOOD PRESSURE: 74 MMHG | HEART RATE: 67 BPM | SYSTOLIC BLOOD PRESSURE: 114 MMHG | OXYGEN SATURATION: 99 %

## 2025-04-25 LAB
ANION GAP SERPL CALC-SCNC: 7 MMOL/L — SIGNIFICANT CHANGE UP (ref 7–14)
BUN SERPL-MCNC: 18 MG/DL — SIGNIFICANT CHANGE UP (ref 10–20)
CALCIUM SERPL-MCNC: 8.8 MG/DL — SIGNIFICANT CHANGE UP (ref 8.4–10.5)
CHLORIDE SERPL-SCNC: 105 MMOL/L — SIGNIFICANT CHANGE UP (ref 98–110)
CO2 SERPL-SCNC: 29 MMOL/L — SIGNIFICANT CHANGE UP (ref 17–32)
CREAT SERPL-MCNC: 0.8 MG/DL — SIGNIFICANT CHANGE UP (ref 0.7–1.5)
EGFR: 82 ML/MIN/1.73M2 — SIGNIFICANT CHANGE UP
EGFR: 82 ML/MIN/1.73M2 — SIGNIFICANT CHANGE UP
GLUCOSE SERPL-MCNC: 84 MG/DL — SIGNIFICANT CHANGE UP (ref 70–99)
POTASSIUM SERPL-MCNC: 4.1 MMOL/L — SIGNIFICANT CHANGE UP (ref 3.5–5)
POTASSIUM SERPL-SCNC: 4.1 MMOL/L — SIGNIFICANT CHANGE UP (ref 3.5–5)
SODIUM SERPL-SCNC: 141 MMOL/L — SIGNIFICANT CHANGE UP (ref 135–146)

## 2025-04-25 PROCEDURE — 93010 ELECTROCARDIOGRAM REPORT: CPT

## 2025-04-25 PROCEDURE — 99233 SBSQ HOSP IP/OBS HIGH 50: CPT | Mod: FS

## 2025-04-25 RX ORDER — APIXABAN 2.5 MG/1
1 TABLET, FILM COATED ORAL
Qty: 60 | Refills: 1
Start: 2025-04-25 | End: 2025-06-23

## 2025-04-25 RX ORDER — APIXABAN 2.5 MG/1
1 TABLET, FILM COATED ORAL
Qty: 60 | Refills: 0
Start: 2025-04-25 | End: 2025-05-24

## 2025-04-25 RX ORDER — ASPIRIN 325 MG
1 TABLET ORAL
Refills: 0 | DISCHARGE

## 2025-04-25 RX ADMIN — Medication 300 MICROGRAM(S): at 05:50

## 2025-04-25 RX ADMIN — LEVETIRACETAM 1500 MILLIGRAM(S): 10 INJECTION, SOLUTION INTRAVENOUS at 05:50

## 2025-04-25 RX ADMIN — APIXABAN 5 MILLIGRAM(S): 2.5 TABLET, FILM COATED ORAL at 11:29

## 2025-04-25 RX ADMIN — Medication 40 MILLIGRAM(S): at 05:50

## 2025-04-25 RX ADMIN — METOPROLOL SUCCINATE 100 MILLIGRAM(S): 50 TABLET, EXTENDED RELEASE ORAL at 05:51

## 2025-04-25 RX ADMIN — Medication 5000 UNIT(S): at 11:29

## 2025-04-25 RX ADMIN — LISINOPRIL 40 MILLIGRAM(S): 5 TABLET ORAL at 05:50

## 2025-04-25 RX ADMIN — Medication 1 APPLICATION(S): at 05:51

## 2025-04-25 NOTE — PROGRESS NOTE ADULT - SUBJECTIVE AND OBJECTIVE BOX
INTERVAL HPI/OVERNIGHT EVENTS:    Patient s/p atrial fibrillation ablation on 4/24/25 with Dr. Bauman.   No events over night  Patietn in NSR.     MEDICATIONS  (STANDING):  apixaban 5 milliGRAM(s) Oral every 12 hours  atorvastatin 20 milliGRAM(s) Oral at bedtime  chlorhexidine 2% Cloths 1 Application(s) Topical <User Schedule>  cholecalciferol 5000 Unit(s) Oral daily  levETIRAcetam 1500 milliGRAM(s) Oral two times a day  levothyroxine 300 MICROGram(s) Oral daily  lidocaine 1%/epinephrine 1:100,000 Inj 10 milliLiter(s) Local Injection once  lisinopril 40 milliGRAM(s) Oral daily  melatonin 5 milliGRAM(s) Oral at bedtime  metoprolol succinate  milliGRAM(s) Oral daily  pantoprazole    Tablet 40 milliGRAM(s) Oral before breakfast    MEDICATIONS  (PRN):  acetaminophen     Tablet .. 650 milliGRAM(s) Oral every 6 hours PRN Mild Pain (1 - 3)      Allergies    No Known Allergies    Intolerances          Vital Signs Last 24 Hrs  T(C): 36.3 (25 Apr 2025 07:40), Max: 36.6 (24 Apr 2025 20:00)  T(F): 97.3 (25 Apr 2025 07:40), Max: 97.8 (24 Apr 2025 20:00)  HR: 67 (25 Apr 2025 07:40) (60 - 74)  BP: 114/74 (25 Apr 2025 07:40) (104/63 - 149/85)  BP(mean): 90 (25 Apr 2025 07:40) (70 - 109)  RR: 20 (25 Apr 2025 07:40) (16 - 26)  SpO2: 99% (25 Apr 2025 07:40) (97% - 100%)    Parameters below as of 25 Apr 2025 04:39  Patient On (Oxygen Delivery Method): room air        HEENT RON EOMI  Lung CTAB  Heart RRR normal S1 S2  abd +BS soft  groins No hematoma, no bleeding; b/l Perclose  Ext no C/C/E    LABS:    04-25    141  |  105  |  18  ----------------------------<  84  4.1   |  29  |  0.8    Ca    8.8      25 Apr 2025 04:46        Urinalysis Basic - ( 25 Apr 2025 04:46 )    Color: x / Appearance: x / SG: x / pH: x  Gluc: 84 mg/dL / Ketone: x  / Bili: x / Urobili: x   Blood: x / Protein: x / Nitrite: x   Leuk Esterase: x / RBC: x / WBC x   Sq Epi: x / Non Sq Epi: x / Bacteria: x           INTERVAL HPI/OVERNIGHT EVENTS:  Patient s/p atrial fibrillation ablation on 4/24/25 with Dr. Bauman.   No events over night  Patietn in NSR.     MEDICATIONS  (STANDING):  apixaban 5 milliGRAM(s) Oral every 12 hours  atorvastatin 20 milliGRAM(s) Oral at bedtime  chlorhexidine 2% Cloths 1 Application(s) Topical <User Schedule>  cholecalciferol 5000 Unit(s) Oral daily  levETIRAcetam 1500 milliGRAM(s) Oral two times a day  levothyroxine 300 MICROGram(s) Oral daily  lidocaine 1%/epinephrine 1:100,000 Inj 10 milliLiter(s) Local Injection once  lisinopril 40 milliGRAM(s) Oral daily  melatonin 5 milliGRAM(s) Oral at bedtime  metoprolol succinate  milliGRAM(s) Oral daily  pantoprazole    Tablet 40 milliGRAM(s) Oral before breakfast    MEDICATIONS  (PRN):  acetaminophen     Tablet .. 650 milliGRAM(s) Oral every 6 hours PRN Mild Pain (1 - 3)      Allergies    No Known Allergies    Intolerances          Vital Signs Last 24 Hrs  T(C): 36.3 (25 Apr 2025 07:40), Max: 36.6 (24 Apr 2025 20:00)  T(F): 97.3 (25 Apr 2025 07:40), Max: 97.8 (24 Apr 2025 20:00)  HR: 67 (25 Apr 2025 07:40) (60 - 74)  BP: 114/74 (25 Apr 2025 07:40) (104/63 - 149/85)  BP(mean): 90 (25 Apr 2025 07:40) (70 - 109)  RR: 20 (25 Apr 2025 07:40) (16 - 26)  SpO2: 99% (25 Apr 2025 07:40) (97% - 100%)    Parameters below as of 25 Apr 2025 04:39  Patient On (Oxygen Delivery Method): room air        HEENT RON EOMI  Lung CTAB  Heart RRR normal S1 S2  abd +BS soft  groins No hematoma, no bleeding; b/l Perclose  Ext no C/C/E    LABS:    04-25    141  |  105  |  18  ----------------------------<  84  4.1   |  29  |  0.8    Ca    8.8      25 Apr 2025 04:46        Urinalysis Basic - ( 25 Apr 2025 04:46 )    Color: x / Appearance: x / SG: x / pH: x  Gluc: 84 mg/dL / Ketone: x  / Bili: x / Urobili: x   Blood: x / Protein: x / Nitrite: x   Leuk Esterase: x / RBC: x / WBC x   Sq Epi: x / Non Sq Epi: x / Bacteria: x

## 2025-04-25 NOTE — PROGRESS NOTE ADULT - ASSESSMENT
A/P  Patient s/p atrial fibrillation ablation on 4/24/25 with Dr. Bauman.   No events over night  Patietn in NSR.   - patient was started on Eliquis 5 mg BID yesterday. C/w Eliquis 5 mg BID for 3 months uninterruptedly  -  mg is held for now and will be resumed after 3 months once Eliquis is stopped   - c/w home dose of Protonix 40 mg daily x 30 days  - No heavy lifting or strenuous exercises x 2 weeks  - No driving x 3 days  - Please avoid submerging under the water for 2 weeks  - FU in office in 3-4 weeks with Dr. Bauman     A/P  Patient s/p atrial fibrillation ablation on 4/24/25 with Dr. Bauman.   No events over night  Patient in NSR.   - patient was started on Eliquis 5 mg BID yesterday. C/w Eliquis 5 mg BID for 3 months uninterruptedly  -  mg is held for now and will be resumed after 3 months once Eliquis is stopped   - c/w home dose of Protonix 40 mg daily x 30 days  - No heavy lifting or strenuous exercises x 2 weeks  - No driving x 3 days  - Please avoid submerging under the water for 2 weeks  - FU in office in 3-4 weeks with Dr. Bauman

## 2025-04-25 NOTE — PROGRESS NOTE ADULT - TIME-BASED BILLING (NON-CRITICAL CARE)
I reviewed with pt. Start bactroban as directed. Recheck 1-2w if not improving - earlier if worse   Time-based billing (NON-critical care)

## 2025-04-25 NOTE — DISCHARGE NOTE NURSING/CASE MANAGEMENT/SOCIAL WORK - PATIENT PORTAL LINK FT
You can access the FollowMyHealth Patient Portal offered by [facility] by registering at the following website: [email or web address]. By joining [employer]’s FollowMyHealth portal, you will also be able to view your health information using other applications (apps) compatible with our system.

## 2025-04-25 NOTE — DISCHARGE NOTE NURSING/CASE MANAGEMENT/SOCIAL WORK - FINANCIAL ASSISTANCE
[facility] provides services at a reduced cost to those who are determined to be eligible through [facility]’s financial assistance program. Information regarding [facility]’s financial assistance program can be found by going to [email or web address] or by calling [phone].

## 2025-04-25 NOTE — DISCHARGE NOTE NURSING/CASE MANAGEMENT/SOCIAL WORK - NSPROEXTENSIONSOFSELF_GEN_A_NUR
Infant elecare formula  Add 2 tsp beechnut rice cereal to each 5 oz bottle  F/U with Dr. Tristen Dejesus in 3-4 weeks eyeglasses

## 2025-04-25 NOTE — PROGRESS NOTE ADULT - NS ATTEND AMEND GEN_ALL_CORE FT
s/p AF ablation  in NSR  Doing well  Cont Eliquis 5 mg PO BID  Hold on aspirin for now  H/o TIAs with hemianopia - follow up with neuro

## 2025-04-25 NOTE — DISCHARGE NOTE NURSING/CASE MANAGEMENT/SOCIAL WORK - NSDCVIVACCINE_GEN_ALL_CORE_FT
Tdap; 15-Jul-2022 21:00; Rachele Kincaid (RN); [employer]; [id number] (Exp. Date: 12-Jul-2024); IntraMuscular; Deltoid Left.; 0.5 milliLiter(s); VIS (VIS Published: 09-May-2013, VIS Presented: 15-Jul-2022);

## 2025-05-02 DIAGNOSIS — Z86.73 PERSONAL HISTORY OF TRANSIENT ISCHEMIC ATTACK (TIA), AND CEREBRAL INFARCTION WITHOUT RESIDUAL DEFICITS: ICD-10-CM

## 2025-05-02 DIAGNOSIS — I48.0 PAROXYSMAL ATRIAL FIBRILLATION: ICD-10-CM

## 2025-05-02 DIAGNOSIS — Z79.82 LONG TERM (CURRENT) USE OF ASPIRIN: ICD-10-CM

## 2025-05-02 DIAGNOSIS — I10 ESSENTIAL (PRIMARY) HYPERTENSION: ICD-10-CM

## 2025-05-02 DIAGNOSIS — E03.9 HYPOTHYROIDISM, UNSPECIFIED: ICD-10-CM

## 2025-05-02 DIAGNOSIS — Z95.818 PRESENCE OF OTHER CARDIAC IMPLANTS AND GRAFTS: ICD-10-CM

## 2025-05-02 DIAGNOSIS — E78.5 HYPERLIPIDEMIA, UNSPECIFIED: ICD-10-CM

## 2025-05-02 DIAGNOSIS — Z90.710 ACQUIRED ABSENCE OF BOTH CERVIX AND UTERUS: ICD-10-CM

## 2025-05-02 DIAGNOSIS — Z79.85 LONG-TERM (CURRENT) USE OF INJECTABLE NON-INSULIN ANTIDIABETIC DRUGS: ICD-10-CM

## 2025-05-02 DIAGNOSIS — R56.9 UNSPECIFIED CONVULSIONS: ICD-10-CM

## 2025-05-02 DIAGNOSIS — Z85.89 PERSONAL HISTORY OF MALIGNANT NEOPLASM OF OTHER ORGANS AND SYSTEMS: ICD-10-CM

## 2025-05-07 ENCOUNTER — APPOINTMENT (OUTPATIENT)
Dept: PAIN MANAGEMENT | Facility: CLINIC | Age: 65
End: 2025-05-07
Payer: COMMERCIAL

## 2025-05-07 DIAGNOSIS — M16.11 UNILATERAL PRIMARY OSTEOARTHRITIS, RIGHT HIP: ICD-10-CM

## 2025-05-07 DIAGNOSIS — M54.16 RADICULOPATHY, LUMBAR REGION: ICD-10-CM

## 2025-05-07 DIAGNOSIS — M51.9 UNSPECIFIED THORACIC, THORACOLUMBAR AND LUMBOSACRAL INTERVERTEBRAL DISC DISORDER: ICD-10-CM

## 2025-05-07 PROCEDURE — 99214 OFFICE O/P EST MOD 30 MIN: CPT

## 2025-05-09 ENCOUNTER — APPOINTMENT (OUTPATIENT)
Dept: ORTHOPEDIC SURGERY | Facility: CLINIC | Age: 65
End: 2025-05-09
Payer: COMMERCIAL

## 2025-05-09 DIAGNOSIS — Z98.1 ARTHRODESIS STATUS: ICD-10-CM

## 2025-05-09 PROCEDURE — 99213 OFFICE O/P EST LOW 20 MIN: CPT

## 2025-06-04 ENCOUNTER — APPOINTMENT (OUTPATIENT)
Dept: PAIN MANAGEMENT | Facility: CLINIC | Age: 65
End: 2025-06-04

## 2025-06-05 ENCOUNTER — APPOINTMENT (OUTPATIENT)
Dept: ORTHOPEDIC SURGERY | Facility: CLINIC | Age: 65
End: 2025-06-05
Payer: COMMERCIAL

## 2025-06-05 PROCEDURE — 99213 OFFICE O/P EST LOW 20 MIN: CPT

## 2025-06-12 ENCOUNTER — EMERGENCY (EMERGENCY)
Facility: HOSPITAL | Age: 65
LOS: 0 days | Discharge: HOME | End: 2025-06-12
Attending: EMERGENCY MEDICINE
Payer: MEDICARE

## 2025-06-12 VITALS
HEART RATE: 80 BPM | OXYGEN SATURATION: 96 % | TEMPERATURE: 98 F | SYSTOLIC BLOOD PRESSURE: 167 MMHG | RESPIRATION RATE: 18 BRPM | DIASTOLIC BLOOD PRESSURE: 73 MMHG

## 2025-06-12 VITALS — WEIGHT: 225.09 LBS | HEIGHT: 68 IN

## 2025-06-12 DIAGNOSIS — S80.812A ABRASION, LEFT LOWER LEG, INITIAL ENCOUNTER: ICD-10-CM

## 2025-06-12 DIAGNOSIS — E78.5 HYPERLIPIDEMIA, UNSPECIFIED: ICD-10-CM

## 2025-06-12 DIAGNOSIS — Z98.890 OTHER SPECIFIED POSTPROCEDURAL STATES: Chronic | ICD-10-CM

## 2025-06-12 DIAGNOSIS — S00.12XA CONTUSION OF LEFT EYELID AND PERIOCULAR AREA, INITIAL ENCOUNTER: ICD-10-CM

## 2025-06-12 DIAGNOSIS — I10 ESSENTIAL (PRIMARY) HYPERTENSION: ICD-10-CM

## 2025-06-12 DIAGNOSIS — S02.2XXA FRACTURE OF NASAL BONES, INITIAL ENCOUNTER FOR CLOSED FRACTURE: ICD-10-CM

## 2025-06-12 DIAGNOSIS — Z92.89 PERSONAL HISTORY OF OTHER MEDICAL TREATMENT: Chronic | ICD-10-CM

## 2025-06-12 DIAGNOSIS — Z98.1 ARTHRODESIS STATUS: Chronic | ICD-10-CM

## 2025-06-12 DIAGNOSIS — Z79.01 LONG TERM (CURRENT) USE OF ANTICOAGULANTS: ICD-10-CM
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
## 2025-06-12 DIAGNOSIS — I48.0 PAROXYSMAL ATRIAL FIBRILLATION: ICD-10-CM

## 2025-06-12 DIAGNOSIS — S09.90XA UNSPECIFIED INJURY OF HEAD, INITIAL ENCOUNTER: ICD-10-CM

## 2025-06-12 LAB
ALBUMIN SERPL ELPH-MCNC: 4.3 G/DL — SIGNIFICANT CHANGE UP (ref 3.5–5.2)
ALP SERPL-CCNC: 86 U/L — SIGNIFICANT CHANGE UP (ref 30–115)
ALT FLD-CCNC: 11 U/L — SIGNIFICANT CHANGE UP (ref 0–41)
ANION GAP SERPL CALC-SCNC: 13 MMOL/L — SIGNIFICANT CHANGE UP (ref 7–14)
APTT BLD: 33.8 SEC — SIGNIFICANT CHANGE UP (ref 27–39.2)
AST SERPL-CCNC: 19 U/L — SIGNIFICANT CHANGE UP (ref 0–41)
BASOPHILS # BLD AUTO: 0.07 K/UL — SIGNIFICANT CHANGE UP (ref 0–0.2)
BASOPHILS NFR BLD AUTO: 1.1 % — HIGH (ref 0–1)
BILIRUB SERPL-MCNC: 0.7 MG/DL — SIGNIFICANT CHANGE UP (ref 0.2–1.2)
BUN SERPL-MCNC: 19 MG/DL — SIGNIFICANT CHANGE UP (ref 10–20)
CALCIUM SERPL-MCNC: 9.3 MG/DL — SIGNIFICANT CHANGE UP (ref 8.4–10.5)
CHLORIDE SERPL-SCNC: 106 MMOL/L — SIGNIFICANT CHANGE UP (ref 98–110)
CO2 SERPL-SCNC: 24 MMOL/L — SIGNIFICANT CHANGE UP (ref 17–32)
CREAT SERPL-MCNC: 1 MG/DL — SIGNIFICANT CHANGE UP (ref 0.7–1.5)
EGFR: 63 ML/MIN/1.73M2 — SIGNIFICANT CHANGE UP
EGFR: 63 ML/MIN/1.73M2 — SIGNIFICANT CHANGE UP
EOSINOPHIL # BLD AUTO: 0.13 K/UL — SIGNIFICANT CHANGE UP (ref 0–0.7)
EOSINOPHIL NFR BLD AUTO: 2.1 % — SIGNIFICANT CHANGE UP (ref 0–8)
GLUCOSE SERPL-MCNC: 96 MG/DL — SIGNIFICANT CHANGE UP (ref 70–99)
HCT VFR BLD CALC: 39.8 % — SIGNIFICANT CHANGE UP (ref 37–47)
HGB BLD-MCNC: 13.4 G/DL — SIGNIFICANT CHANGE UP (ref 12–16)
IMM GRANULOCYTES NFR BLD AUTO: 0.2 % — SIGNIFICANT CHANGE UP (ref 0.1–0.3)
INR BLD: 1.01 RATIO — SIGNIFICANT CHANGE UP (ref 0.65–1.3)
LACTATE SERPL-SCNC: 1 MMOL/L — SIGNIFICANT CHANGE UP (ref 0.7–2)
LIDOCAIN IGE QN: 25 U/L — SIGNIFICANT CHANGE UP (ref 7–60)
LYMPHOCYTES # BLD AUTO: 1.77 K/UL — SIGNIFICANT CHANGE UP (ref 1.2–3.4)
LYMPHOCYTES # BLD AUTO: 28.1 % — SIGNIFICANT CHANGE UP (ref 20.5–51.1)
MCHC RBC-ENTMCNC: 31.2 PG — HIGH (ref 27–31)
MCHC RBC-ENTMCNC: 33.7 G/DL — SIGNIFICANT CHANGE UP (ref 32–37)
MCV RBC AUTO: 92.6 FL — SIGNIFICANT CHANGE UP (ref 81–99)
MONOCYTES # BLD AUTO: 0.61 K/UL — HIGH (ref 0.1–0.6)
MONOCYTES NFR BLD AUTO: 9.7 % — HIGH (ref 1.7–9.3)
NEUTROPHILS # BLD AUTO: 3.7 K/UL — SIGNIFICANT CHANGE UP (ref 1.4–6.5)
NEUTROPHILS NFR BLD AUTO: 58.8 % — SIGNIFICANT CHANGE UP (ref 42.2–75.2)
NRBC BLD AUTO-RTO: 0 /100 WBCS — SIGNIFICANT CHANGE UP (ref 0–0)
PLATELET # BLD AUTO: 234 K/UL — SIGNIFICANT CHANGE UP (ref 130–400)
PMV BLD: 11.6 FL — HIGH (ref 7.4–10.4)
POTASSIUM SERPL-MCNC: 4.2 MMOL/L — SIGNIFICANT CHANGE UP (ref 3.5–5)
POTASSIUM SERPL-SCNC: 4.2 MMOL/L — SIGNIFICANT CHANGE UP (ref 3.5–5)
PROT SERPL-MCNC: 7.1 G/DL — SIGNIFICANT CHANGE UP (ref 6–8)
PROTHROM AB SERPL-ACNC: 11.9 SEC — SIGNIFICANT CHANGE UP (ref 9.95–12.87)
RBC # BLD: 4.3 M/UL — SIGNIFICANT CHANGE UP (ref 4.2–5.4)
RBC # FLD: 13.1 % — SIGNIFICANT CHANGE UP (ref 11.5–14.5)
SODIUM SERPL-SCNC: 143 MMOL/L — SIGNIFICANT CHANGE UP (ref 135–146)
WBC # BLD: 6.29 K/UL — SIGNIFICANT CHANGE UP (ref 4.8–10.8)
WBC # FLD AUTO: 6.29 K/UL — SIGNIFICANT CHANGE UP (ref 4.8–10.8)

## 2025-06-12 PROCEDURE — 99285 EMERGENCY DEPT VISIT HI MDM: CPT | Mod: FS

## 2025-06-12 PROCEDURE — 93010 ELECTROCARDIOGRAM REPORT: CPT

## 2025-06-12 PROCEDURE — 86850 RBC ANTIBODY SCREEN: CPT

## 2025-06-12 PROCEDURE — 72125 CT NECK SPINE W/O DYE: CPT | Mod: 26

## 2025-06-12 PROCEDURE — 86901 BLOOD TYPING SEROLOGIC RH(D): CPT

## 2025-06-12 PROCEDURE — 70486 CT MAXILLOFACIAL W/O DYE: CPT | Mod: 26

## 2025-06-12 PROCEDURE — 76705 ECHO EXAM OF ABDOMEN: CPT

## 2025-06-12 PROCEDURE — 71045 X-RAY EXAM CHEST 1 VIEW: CPT

## 2025-06-12 PROCEDURE — 93005 ELECTROCARDIOGRAM TRACING: CPT

## 2025-06-12 PROCEDURE — 72125 CT NECK SPINE W/O DYE: CPT

## 2025-06-12 PROCEDURE — 86900 BLOOD TYPING SEROLOGIC ABO: CPT

## 2025-06-12 PROCEDURE — 85610 PROTHROMBIN TIME: CPT

## 2025-06-12 PROCEDURE — 70486 CT MAXILLOFACIAL W/O DYE: CPT

## 2025-06-12 PROCEDURE — 70450 CT HEAD/BRAIN W/O DYE: CPT | Mod: 26

## 2025-06-12 PROCEDURE — 85025 COMPLETE CBC W/AUTO DIFF WBC: CPT

## 2025-06-12 PROCEDURE — 83605 ASSAY OF LACTIC ACID: CPT

## 2025-06-12 PROCEDURE — 99285 EMERGENCY DEPT VISIT HI MDM: CPT | Mod: 25

## 2025-06-12 PROCEDURE — 71045 X-RAY EXAM CHEST 1 VIEW: CPT | Mod: 26

## 2025-06-12 PROCEDURE — 72170 X-RAY EXAM OF PELVIS: CPT | Mod: 26

## 2025-06-12 PROCEDURE — 76705 ECHO EXAM OF ABDOMEN: CPT | Mod: 26

## 2025-06-12 PROCEDURE — 85730 THROMBOPLASTIN TIME PARTIAL: CPT

## 2025-06-12 PROCEDURE — 72170 X-RAY EXAM OF PELVIS: CPT

## 2025-06-12 PROCEDURE — 83690 ASSAY OF LIPASE: CPT

## 2025-06-12 PROCEDURE — 80053 COMPREHEN METABOLIC PANEL: CPT

## 2025-06-12 PROCEDURE — 70450 CT HEAD/BRAIN W/O DYE: CPT

## 2025-06-12 RX ORDER — ACETAMINOPHEN 500 MG/5ML
650 LIQUID (ML) ORAL ONCE
Refills: 0 | Status: COMPLETED | OUTPATIENT
Start: 2025-06-12 | End: 2025-06-12

## 2025-06-12 RX ADMIN — Medication 650 MILLIGRAM(S): at 16:12

## 2025-06-12 NOTE — ED ADULT NURSE NOTE - AVIAN FLU SYMPTOMS
SPINE EVALUATION:   Referring Physician: Dr. Heri Storey  Diagnosis: Cervicalgia (M54.2)  Stiffness of cervical spine (M43.6)        Date of Service: 8/16/2021     PATIENT Letty Hodgkin is a 53year old male who presents to therapy today with compla and HEP. Pt voiced understanding and performs HEP correctly without reported pain. Skilled Physical Therapy is medically necessary to address the above impairments and reach functional goals.      Precautions:  None  OBJECTIVE:   Observation/Posture: incre Treatment Time: 45 min     PLAN OF CARE:    Goals: (to be met in 8 visits)   (I) with HEP  Demonstrate proper sitting/standing posture to help decrease neck strain. Bilat cervical rotation 75 deg without symptoms for easier head turning.   Able to tolerate No

## 2025-06-12 NOTE — ED PROVIDER NOTE - CARE PLAN
Principal Discharge DX:	Nasal bone fracture  Secondary Diagnosis:	Fall  Secondary Diagnosis:	Skin abrasion   1

## 2025-06-12 NOTE — ED PROVIDER NOTE - OBJECTIVE STATEMENT
Patient is a 64-year-old female with past medical history of hypertension, hyperlipidemia, paroxysmal A-fib on Eliquis presents status post mechanical trip and fall forward with head injury onto her face.  She also has abrasions to her left anterior shin and face.  She is up-to-date with tetanus.  She denies any LOC, fever, cough, sore throat, nausea, vomiting, chest pain, shortness of breath, abdominal pain, urinary complaints.

## 2025-06-12 NOTE — ED PROVIDER NOTE - ATTENDING APP SHARED VISIT CONTRIBUTION OF CARE
I personally evaluated patient. I agree with the findings and plan with all documentation on chart except as documented  in my note.    64-year-old female past medical history hypertension, dyslipidemia, paroxysmal A-fib on Eliquis who presents to the ED status post mechanical trip and fall forward onto her face.  Patient had pain to her nose with mild swelling and hit her left anterior shin.  Patient no preceding dizziness or other symptoms.  On exam, vital signs reviewed.  GCS 15.  Primary survey is intact.  Appropriate trauma imaging performed and patient found to have a nasal bone fracture.  Will discharge with appropriate nasal bone precautions and ENT follow-up.    Full DC instructions discussed and patient knows when to seek immediate medical attention.  Patient has proper follow up.  All results discussed and patient aware they may require further work up.  Proper follow up ensured. Limitations of ED work up discussed.  Medications administered and prescribed/OTC home meds discussed.  All questions and concerns from patient or family addressed. Understanding of instructions verbalized.

## 2025-06-12 NOTE — ED ADULT NURSE NOTE - OBJECTIVE STATEMENT
Presented to ED, pt states "I fell, hit face". denies LOC, on Eliquis, presents with abrasions and lac to nose and chin

## 2025-06-12 NOTE — ED PROVIDER NOTE - NSFOLLOWUPINSTRUCTIONS_ED_ALL_ED_FT
Follow up with an ENT or Nasal Specialist    Our Emergency Department Referral Coordinators will be reaching out to you in the next 24-48 hours from 9:00am to 5:00pm with a follow up appointment. Please expect a phone call from the hospital in that time frame. If you do not receive a call or if you have any questions or concerns, you can reach them at   [phone]    Follow up with ENT in 1 week.    Nasal Fracture    WHAT YOU NEED TO KNOW:    What is a nasal fracture? A nasal fracture is a crack or break in your nose. You may have a break in the upper nose (bridge), the side, or the septum. The septum is in the middle of the nose and divides your nostrils.    What are the signs and symptoms of a nasal fracture?   •Pain and swelling  •Nosebleed  •Deformed nose  •Crackling sound when you touch or move your nose  •Bruising on your nose or under your eyes    How is a nasal fracture treated?   •Medicine may be given to decrease pain or help prevent a bacterial infection. Ask how to take pain medicine safely. Medicine may also be given to decrease nasal swelling and help make breathing easier.  •Wound care may help stop bleeding. If you have a hematoma inside your nose, it will be drained. Healthcare providers may place packing (gauze or other material) inside your nose to soak up blood.  •Closed reduction may be done to put your nasal bones back into the correct position. Local or general anesthesia is used during this procedure. This procedure may be done right away or several days after your injury when the swelling has gone down. Surgery (open reduction) to put your bones back into place may be needed for severe fractures.  •Splints or packing help keep your nose in place for 7 to 10 days after a reduction. Ask your healthcare provider how to care for your wounds, splint, or packing.    How do I care for my nasal fracture at home?   •Apply ice on your nose for 15 to 20 minutes every hour or as directed. Use an ice pack, or put crushed ice in a plastic bag. Cover it with a towel. Ice helps prevent tissue damage and decreases swelling and pain.  •Elevate your head when you lie down. This will help decrease swelling and pain. You may need to see a specialist 3 to 5 days later for tests or more treatment after swelling has gone down.  •Protect your nose to prevent bleeding, bruising, or another fracture. Try not to bump your nose on anything. You may not be able to play sports for up to 6 weeks.    When should I seek immediate care?   •You feel like one or both of your nasal passages are blocked and you have trouble breathing.  •Clear fluid is leaking from your nose.  •You have severe nose pain, even after you take medicine.  •You have double vision or have problems moving your eyes.    When should I call my doctor?   •You have a fever.  •You continue to have nosebleeds.  •You have a headache that gets worse, even after you take pain medicine.  •Your splint or packing is loose.  •You have questions or concerns about your condition or care.

## 2025-06-12 NOTE — ED PROVIDER NOTE - DIFFERENTIAL DIAGNOSIS
The differential diagnosis for patients clinical presentation includes but is not limited to:  Closed head injury, skull fracture, SDH, SAH, ICH, cervical spine injury, rib fracture, intra-abdominal injury, fracture, contusion, sprain, strain Differential Diagnosis

## 2025-06-12 NOTE — ED PROVIDER NOTE - PATIENT PORTAL LINK FT
You can access the FollowMyHealth Patient Portal offered by [facility] by registering at the following website: [email or web address]. By joining [email or web address]’s FollowMyHealth portal, you will also be able to view your health information using other applications (apps) compatible with our system.

## 2025-06-12 NOTE — ED PROVIDER NOTE - CLINICAL SUMMARY MEDICAL DECISION MAKING FREE TEXT BOX
64-year-old female past medical history hypertension, dyslipidemia, paroxysmal A-fib on Eliquis who presents to the ED status post mechanical trip and fall forward onto her face.  Patient had pain to her nose with mild swelling and hit her left anterior shin.  Patient no preceding dizziness or other symptoms.  On exam, vital signs reviewed.  GCS 15.  Primary survey is intact.  Appropriate trauma imaging performed and patient found to have a nasal bone fracture.  Will discharge with appropriate nasal bone precautions and ENT follow-up.

## 2025-06-12 NOTE — ED PROVIDER NOTE - PHYSICAL EXAMINATION
As Follows:  CONST: Well appearing in NAD  EYES: PERRL, EOMI, Sclera and conjunctiva clear.   ENT: Tenderness to nasal bones, no septal hematoma. Tenderness to left periorbital area with mild ecchymosis and swelling. No maxillary tenderness or injury. No nasal discharge. Oropharynx normal appearing, no erythema / exudates. Uvula midline. Airway intact.   CARD: No murmurs, rubs, or gallops; Normal rate and rhythm  RESP: BS Equal B/L, No wheezes, rhonchi or rales. No distress or accessory breathing  GI: Soft, non-tender, non-distended.  MS: Nontender pelvis/ lower extremities/ upper extremities. Normal ROM in all extremities. No midline Cervical/Thoracic/Lumbar spinal tenderness.  SKIN: Left anterior shin abrasion, no repairable laceration. Warm, dry, no acute rashes. MMM  NEURO: Alert and Oriented, No focal deficits. Strength and sensation intact. Steady gait

## 2025-06-12 NOTE — ED ADULT TRIAGE NOTE - CHIEF COMPLAINT QUOTE
pt states "I fell, hit face". denies LOC, on Eliquis, presents with abrasions and lac to nose and chin

## 2025-07-07 ENCOUNTER — APPOINTMENT (OUTPATIENT)
Dept: ELECTROPHYSIOLOGY | Facility: CLINIC | Age: 65
End: 2025-07-07
Payer: COMMERCIAL

## 2025-07-07 VITALS
DIASTOLIC BLOOD PRESSURE: 100 MMHG | WEIGHT: 220 LBS | HEART RATE: 79 BPM | HEIGHT: 68 IN | SYSTOLIC BLOOD PRESSURE: 146 MMHG | BODY MASS INDEX: 33.34 KG/M2

## 2025-07-07 PROCEDURE — 93000 ELECTROCARDIOGRAM COMPLETE: CPT

## 2025-07-07 PROCEDURE — G2211 COMPLEX E/M VISIT ADD ON: CPT | Mod: NC

## 2025-07-07 PROCEDURE — 99214 OFFICE O/P EST MOD 30 MIN: CPT

## 2025-07-07 RX ORDER — APIXABAN 5 MG/1
5 TABLET, FILM COATED ORAL TWICE DAILY
Refills: 0 | Status: ACTIVE | COMMUNITY

## 2025-07-09 ENCOUNTER — APPOINTMENT (OUTPATIENT)
Dept: ORTHOPEDIC SURGERY | Facility: CLINIC | Age: 65
End: 2025-07-09
Payer: COMMERCIAL

## 2025-07-09 PROCEDURE — 99213 OFFICE O/P EST LOW 20 MIN: CPT

## 2025-07-11 ENCOUNTER — APPOINTMENT (OUTPATIENT)
Dept: CARDIOLOGY | Facility: CLINIC | Age: 65
End: 2025-07-11
Payer: COMMERCIAL

## 2025-07-11 VITALS
BODY MASS INDEX: 33.34 KG/M2 | SYSTOLIC BLOOD PRESSURE: 124 MMHG | DIASTOLIC BLOOD PRESSURE: 84 MMHG | OXYGEN SATURATION: 96 % | HEART RATE: 79 BPM | HEIGHT: 68 IN | WEIGHT: 220 LBS

## 2025-07-11 PROBLEM — I34.0 MITRAL VALVE INSUFFICIENCY, UNSPECIFIED ETIOLOGY: Status: ACTIVE | Noted: 2025-07-11

## 2025-07-11 PROCEDURE — 93000 ELECTROCARDIOGRAM COMPLETE: CPT

## 2025-07-11 PROCEDURE — 99214 OFFICE O/P EST MOD 30 MIN: CPT | Mod: 25

## 2025-08-05 ENCOUNTER — APPOINTMENT (OUTPATIENT)
Dept: ORTHOPEDIC SURGERY | Facility: CLINIC | Age: 65
End: 2025-08-05
Payer: MEDICARE

## 2025-08-05 DIAGNOSIS — M54.12 RADICULOPATHY, CERVICAL REGION: ICD-10-CM

## 2025-08-05 DIAGNOSIS — Z98.1 ARTHRODESIS STATUS: ICD-10-CM

## 2025-08-05 DIAGNOSIS — E66.9 OBESITY, UNSPECIFIED: ICD-10-CM

## 2025-08-05 DIAGNOSIS — M17.0 BILATERAL PRIMARY OSTEOARTHRITIS OF KNEE: ICD-10-CM

## 2025-08-05 DIAGNOSIS — M51.9 UNSPECIFIED THORACIC, THORACOLUMBAR AND LUMBOSACRAL INTERVERTEBRAL DISC DISORDER: ICD-10-CM

## 2025-08-05 PROCEDURE — 99203 OFFICE O/P NEW LOW 30 MIN: CPT

## 2025-08-05 PROCEDURE — 99213 OFFICE O/P EST LOW 20 MIN: CPT

## 2025-08-05 PROCEDURE — G2211 COMPLEX E/M VISIT ADD ON: CPT

## 2025-09-13 ENCOUNTER — APPOINTMENT (OUTPATIENT)
Dept: ENDOCRINOLOGY | Facility: CLINIC | Age: 65
End: 2025-09-13
Payer: MEDICARE

## 2025-09-13 VITALS
HEIGHT: 68 IN | OXYGEN SATURATION: 100 % | DIASTOLIC BLOOD PRESSURE: 80 MMHG | WEIGHT: 224 LBS | HEART RATE: 77 BPM | BODY MASS INDEX: 33.95 KG/M2 | SYSTOLIC BLOOD PRESSURE: 140 MMHG

## 2025-09-13 DIAGNOSIS — D35.2 BENIGN NEOPLASM OF PITUITARY GLAND: ICD-10-CM

## 2025-09-13 DIAGNOSIS — E78.00 PURE HYPERCHOLESTEROLEMIA, UNSPECIFIED: ICD-10-CM

## 2025-09-13 DIAGNOSIS — R73.03 PREDIABETES.: ICD-10-CM

## 2025-09-13 DIAGNOSIS — E03.9 HYPOTHYROIDISM, UNSPECIFIED: ICD-10-CM

## 2025-09-13 PROCEDURE — 99204 OFFICE O/P NEW MOD 45 MIN: CPT

## 2025-09-13 RX ORDER — CONJUGATED ESTROGENS 0.62 MG/G
0.62 CREAM VAGINAL
Refills: 0 | Status: ACTIVE | COMMUNITY

## 2025-09-18 ENCOUNTER — APPOINTMENT (OUTPATIENT)
Dept: CV DIAGNOSITCS | Facility: HOSPITAL | Age: 65
End: 2025-09-18
Payer: MEDICARE

## 2025-09-18 ENCOUNTER — RESULT REVIEW (OUTPATIENT)
Age: 65
End: 2025-09-18

## 2025-09-18 PROCEDURE — 93306 TTE W/DOPPLER COMPLETE: CPT | Mod: 26
